# Patient Record
Sex: MALE | Race: WHITE | NOT HISPANIC OR LATINO | Employment: OTHER
[De-identification: names, ages, dates, MRNs, and addresses within clinical notes are randomized per-mention and may not be internally consistent; named-entity substitution may affect disease eponyms.]

---

## 2020-09-06 ENCOUNTER — HOSPITAL (OUTPATIENT)
Dept: OTHER | Age: 85
End: 2020-09-06
Attending: SURGERY

## 2020-09-06 PROCEDURE — 99284 EMERGENCY DEPT VISIT MOD MDM: CPT | Performed by: STUDENT IN AN ORGANIZED HEALTH CARE EDUCATION/TRAINING PROGRAM

## 2020-09-06 PROCEDURE — 93308 TTE F-UP OR LMTD: CPT | Performed by: SURGERY

## 2020-09-06 PROCEDURE — 99223 1ST HOSP IP/OBS HIGH 75: CPT | Performed by: SURGERY

## 2020-09-06 PROCEDURE — 76705 ECHO EXAM OF ABDOMEN: CPT | Performed by: SURGERY

## 2020-09-06 PROCEDURE — 12002 RPR S/N/AX/GEN/TRNK2.6-7.5CM: CPT | Performed by: SURGERY

## 2020-09-07 LAB
SARS-COV-2 RNA RESP QL NAA+PROBE: NOT DETECTED
SERVICE CMNT-IMP: NORMAL
SPECIMEN SOURCE: NORMAL

## 2020-09-07 PROCEDURE — 99232 SBSQ HOSP IP/OBS MODERATE 35: CPT | Performed by: SURGERY

## 2020-09-08 PROCEDURE — 99232 SBSQ HOSP IP/OBS MODERATE 35: CPT | Performed by: SURGERY

## 2020-09-09 PROCEDURE — 99232 SBSQ HOSP IP/OBS MODERATE 35: CPT | Performed by: SURGERY

## 2020-09-10 PROCEDURE — 99238 HOSP IP/OBS DSCHRG MGMT 30/<: CPT | Performed by: SURGERY

## 2021-12-02 ENCOUNTER — APPOINTMENT (OUTPATIENT)
Dept: GENERAL RADIOLOGY | Age: 86
DRG: 689 | End: 2021-12-02
Attending: STUDENT IN AN ORGANIZED HEALTH CARE EDUCATION/TRAINING PROGRAM

## 2021-12-02 ENCOUNTER — HOSPITAL ENCOUNTER (INPATIENT)
Age: 86
LOS: 4 days | Discharge: NURSING, REHAB OR LONG-TERM CARE | DRG: 689 | End: 2021-12-07
Attending: EMERGENCY MEDICINE | Admitting: INTERNAL MEDICINE

## 2021-12-02 ENCOUNTER — APPOINTMENT (OUTPATIENT)
Dept: CT IMAGING | Age: 86
DRG: 689 | End: 2021-12-02
Attending: STUDENT IN AN ORGANIZED HEALTH CARE EDUCATION/TRAINING PROGRAM

## 2021-12-02 DIAGNOSIS — R41.82 ALTERED MENTAL STATUS, UNSPECIFIED ALTERED MENTAL STATUS TYPE: ICD-10-CM

## 2021-12-02 DIAGNOSIS — N39.0 URINARY TRACT INFECTION WITHOUT HEMATURIA, SITE UNSPECIFIED: ICD-10-CM

## 2021-12-02 DIAGNOSIS — Z00.00 WELLNESS EXAMINATION: Primary | ICD-10-CM

## 2021-12-02 LAB
ALBUMIN SERPL-MCNC: 2.8 G/DL (ref 3.6–5.1)
ALBUMIN/GLOB SERPL: 0.7 {RATIO} (ref 1–2.4)
ALP SERPL-CCNC: 51 UNITS/L (ref 45–117)
ALT SERPL-CCNC: 37 UNITS/L
ANION GAP SERPL CALC-SCNC: 13 MMOL/L (ref 10–20)
APPEARANCE UR: ABNORMAL
APPEARANCE UR: ABNORMAL
AST SERPL-CCNC: 100 UNITS/L
BACTERIA #/AREA URNS HPF: ABNORMAL /HPF
BASE EXCESS / DEFICIT, VENOUS - RESPIRATORY: 0 MMOL/L (ref -2–2)
BASOPHILS # BLD: 0 K/MCL (ref 0–0.3)
BASOPHILS NFR BLD: 0 %
BDY SITE: ABNORMAL
BILIRUB SERPL-MCNC: 1.3 MG/DL (ref 0.2–1)
BILIRUB UR QL STRIP: NEGATIVE
BILIRUB UR QL STRIP: NEGATIVE
BODY TEMPERATURE: 37 DEGREES
BUN SERPL-MCNC: 22 MG/DL (ref 6–20)
BUN/CREAT SERPL: 18 (ref 7–25)
CA-I BLD-SCNC: 1.22 MMOL/L (ref 1.15–1.29)
CALCIUM SERPL-MCNC: 9 MG/DL (ref 8.4–10.2)
CHLORIDE BLD-SCNC: 101 MMOL/L (ref 98–107)
CHLORIDE SERPL-SCNC: 102 MMOL/L (ref 98–107)
CO2 SERPL-SCNC: 25 MMOL/L (ref 21–32)
COHGB MFR BLDV: 0.9 % (ref 1.5–15)
COLOR UR: YELLOW
COLOR UR: YELLOW
CREAT SERPL-MCNC: 1.2 MG/DL (ref 0.67–1.17)
DEPRECATED RDW RBC: 45 FL (ref 39–50)
EOSINOPHIL # BLD: 0.3 K/MCL (ref 0–0.5)
EOSINOPHIL NFR BLD: 3 %
ERYTHROCYTE [DISTWIDTH] IN BLOOD: 13.2 % (ref 11–15)
FASTING DURATION TIME PATIENT: ABNORMAL H
FIO2 ON VENT: 21 %
GFR SERPLBLD BASED ON 1.73 SQ M-ARVRAT: 54 ML/MIN
GLOBULIN SER-MCNC: 4 G/DL (ref 2–4)
GLUCOSE BLD-MCNC: 99 MG/DL (ref 65–99)
GLUCOSE SERPL-MCNC: 98 MG/DL (ref 70–99)
GLUCOSE UR STRIP-MCNC: NEGATIVE MG/DL
GLUCOSE UR STRIP-MCNC: NEGATIVE MG/DL
HCO3 BLDV-SCNC: 26.9 MMOL/L (ref 22–28)
HCT VFR BLD CALC: 38.9 % (ref 39–51)
HGB BLD-MCNC: 13.2 G/DL (ref 13–17)
HGB BLD-MCNC: 15.6 G/DL (ref 13–17)
HGB UR QL STRIP: ABNORMAL
HGB UR QL STRIP: ABNORMAL
HYALINE CASTS #/AREA URNS LPF: ABNORMAL /LPF
IMM GRANULOCYTES # BLD AUTO: 0.1 K/MCL (ref 0–0.2)
IMM GRANULOCYTES # BLD: 1 %
KETONES UR STRIP-MCNC: NEGATIVE MG/DL
KETONES UR STRIP-MCNC: NEGATIVE MG/DL
LACTATE BLDV-SCNC: 1.3 MMOL/L (ref 0–2)
LACTATE BLDV-SCNC: 2.3 MMOL/L
LEUKOCYTE ESTERASE UR QL STRIP: ABNORMAL
LEUKOCYTE ESTERASE UR QL STRIP: ABNORMAL
LYMPHOCYTES # BLD: 1.6 K/MCL (ref 1–4)
LYMPHOCYTES NFR BLD: 16 %
MCH RBC QN AUTO: 31.9 PG (ref 26–34)
MCHC RBC AUTO-ENTMCNC: 33.9 G/DL (ref 32–36.5)
MCV RBC AUTO: 94 FL (ref 78–100)
METHGB MFR BLDMV: 0.4 %
MONOCYTES # BLD: 1.2 K/MCL (ref 0.3–0.9)
MONOCYTES NFR BLD: 12 %
MUCOUS THREADS URNS QL MICRO: PRESENT
NEUTROPHILS # BLD: 6.8 K/MCL (ref 1.8–7.7)
NEUTROPHILS NFR BLD: 68 %
NITRITE UR QL STRIP: NEGATIVE
NITRITE UR QL STRIP: NEGATIVE
NRBC BLD MANUAL-RTO: 0 /100 WBC
OXYHGB MFR BLDV: 27.2 % (ref 60–80)
PCO2 BLDV: 51 MM HG (ref 41–54)
PH BLDV: 7.33 UNITS (ref 7.35–7.45)
PH UR STRIP: 6 UNITS (ref 5–7)
PH UR STRIP: 6 [PH] (ref 5–7)
PLATELET # BLD AUTO: 274 K/MCL (ref 140–450)
PO2 BLDV: 20 MM HG (ref 35–42)
POTASSIUM BLD-SCNC: 3.6 MMOL/L (ref 3.4–5.1)
POTASSIUM SERPL-SCNC: 4.1 MMOL/L (ref 3.4–5.1)
PROT SERPL-MCNC: 6.8 G/DL (ref 6.4–8.2)
PROT UR STRIP-MCNC: NEGATIVE MG/DL
PROT UR STRIP-MCNC: NEGATIVE MG/DL
RBC # BLD: 4.14 MIL/MCL (ref 4.5–5.9)
RBC #/AREA URNS HPF: ABNORMAL /HPF
SAO2 DF BLDV: 28 % (ref 60–80)
SARS-COV-2 RNA RESP QL NAA+PROBE: NOT DETECTED
SERVICE CMNT-IMP: NORMAL
SERVICE CMNT-IMP: NORMAL
SODIUM BLD-SCNC: 132 MMOL/L (ref 135–145)
SODIUM SERPL-SCNC: 136 MMOL/L (ref 135–145)
SP GR UR STRIP: 1.01 (ref 1–1.03)
SP GR UR STRIP: 1.02 (ref 1–1.03)
SQUAMOUS #/AREA URNS HPF: ABNORMAL /HPF
TROPONIN I SERPL DL<=0.01 NG/ML-MCNC: 20 NG/L
UROBILINOGEN UR STRIP-MCNC: 0.2 MG/DL
UROBILINOGEN UR STRIP-MCNC: 0.2 MG/DL
WBC # BLD: 10 K/MCL (ref 4.2–11)
WBC #/AREA URNS HPF: >100 /HPF

## 2021-12-02 PROCEDURE — 87088 URINE BACTERIA CULTURE: CPT | Performed by: STUDENT IN AN ORGANIZED HEALTH CARE EDUCATION/TRAINING PROGRAM

## 2021-12-02 PROCEDURE — G1004 CDSM NDSC: HCPCS

## 2021-12-02 PROCEDURE — 84484 ASSAY OF TROPONIN QUANT: CPT | Performed by: EMERGENCY MEDICINE

## 2021-12-02 PROCEDURE — 10004281 HB COUNTER-STAFF TIME PER 15 MIN

## 2021-12-02 PROCEDURE — 81003 URINALYSIS AUTO W/O SCOPE: CPT

## 2021-12-02 PROCEDURE — 87635 SARS-COV-2 COVID-19 AMP PRB: CPT | Performed by: EMERGENCY MEDICINE

## 2021-12-02 PROCEDURE — 82375 ASSAY CARBOXYHB QUANT: CPT

## 2021-12-02 PROCEDURE — 82746 ASSAY OF FOLIC ACID SERUM: CPT | Performed by: SPECIALIST/TECHNOLOGIST, OTHER

## 2021-12-02 PROCEDURE — 82550 ASSAY OF CK (CPK): CPT | Performed by: SPECIALIST/TECHNOLOGIST, OTHER

## 2021-12-02 PROCEDURE — 83605 ASSAY OF LACTIC ACID: CPT | Performed by: EMERGENCY MEDICINE

## 2021-12-02 PROCEDURE — C9803 HOPD COVID-19 SPEC COLLECT: HCPCS

## 2021-12-02 PROCEDURE — 85018 HEMOGLOBIN: CPT

## 2021-12-02 PROCEDURE — 82435 ASSAY OF BLOOD CHLORIDE: CPT

## 2021-12-02 PROCEDURE — 80053 COMPREHEN METABOLIC PANEL: CPT | Performed by: EMERGENCY MEDICINE

## 2021-12-02 PROCEDURE — 82330 ASSAY OF CALCIUM: CPT

## 2021-12-02 PROCEDURE — 99284 EMERGENCY DEPT VISIT MOD MDM: CPT | Performed by: EMERGENCY MEDICINE

## 2021-12-02 PROCEDURE — 71045 X-RAY EXAM CHEST 1 VIEW: CPT

## 2021-12-02 PROCEDURE — 93010 ELECTROCARDIOGRAM REPORT: CPT | Performed by: INTERNAL MEDICINE

## 2021-12-02 PROCEDURE — G0378 HOSPITAL OBSERVATION PER HR: HCPCS

## 2021-12-02 PROCEDURE — 70450 CT HEAD/BRAIN W/O DYE: CPT

## 2021-12-02 PROCEDURE — 83605 ASSAY OF LACTIC ACID: CPT

## 2021-12-02 PROCEDURE — 82330 ASSAY OF CALCIUM: CPT | Performed by: EMERGENCY MEDICINE

## 2021-12-02 PROCEDURE — 81001 URINALYSIS AUTO W/SCOPE: CPT | Performed by: STUDENT IN AN ORGANIZED HEALTH CARE EDUCATION/TRAINING PROGRAM

## 2021-12-02 PROCEDURE — 84295 ASSAY OF SERUM SODIUM: CPT

## 2021-12-02 PROCEDURE — 86592 SYPHILIS TEST NON-TREP QUAL: CPT | Performed by: SPECIALIST/TECHNOLOGIST, OTHER

## 2021-12-02 PROCEDURE — 99285 EMERGENCY DEPT VISIT HI MDM: CPT

## 2021-12-02 PROCEDURE — 84132 ASSAY OF SERUM POTASSIUM: CPT

## 2021-12-02 PROCEDURE — 93005 ELECTROCARDIOGRAM TRACING: CPT | Performed by: EMERGENCY MEDICINE

## 2021-12-02 PROCEDURE — 96374 THER/PROPH/DIAG INJ IV PUSH: CPT

## 2021-12-02 PROCEDURE — 85025 COMPLETE CBC W/AUTO DIFF WBC: CPT | Performed by: EMERGENCY MEDICINE

## 2021-12-02 PROCEDURE — 36415 COLL VENOUS BLD VENIPUNCTURE: CPT

## 2021-12-02 PROCEDURE — 10002807 HB RX 258: Performed by: STUDENT IN AN ORGANIZED HEALTH CARE EDUCATION/TRAINING PROGRAM

## 2021-12-02 PROCEDURE — 82947 ASSAY GLUCOSE BLOOD QUANT: CPT

## 2021-12-02 PROCEDURE — 10002800 HB RX 250 W HCPCS: Performed by: EMERGENCY MEDICINE

## 2021-12-02 PROCEDURE — 84443 ASSAY THYROID STIM HORMONE: CPT | Performed by: SPECIALIST/TECHNOLOGIST, OTHER

## 2021-12-02 RX ORDER — ACETAMINOPHEN 325 MG/1
650 TABLET ORAL EVERY 4 HOURS PRN
Status: DISCONTINUED | OUTPATIENT
Start: 2021-12-02 | End: 2021-12-04

## 2021-12-02 RX ORDER — QUETIAPINE FUMARATE 25 MG/1
25 TABLET, FILM COATED ORAL 2 TIMES DAILY
Status: ON HOLD | COMMUNITY
Start: 2021-10-29 | End: 2021-12-06 | Stop reason: HOSPADM

## 2021-12-02 RX ORDER — DONEPEZIL HYDROCHLORIDE 5 MG/1
TABLET, FILM COATED ORAL
COMMUNITY
Start: 2021-10-18

## 2021-12-02 RX ORDER — ENOXAPARIN SODIUM 100 MG/ML
40 INJECTION SUBCUTANEOUS DAILY
Status: DISCONTINUED | OUTPATIENT
Start: 2021-12-03 | End: 2021-12-07 | Stop reason: HOSPADM

## 2021-12-02 RX ORDER — ONDANSETRON 2 MG/ML
4 INJECTION INTRAMUSCULAR; INTRAVENOUS 4 TIMES DAILY PRN
Status: DISCONTINUED | OUTPATIENT
Start: 2021-12-02 | End: 2021-12-07 | Stop reason: HOSPADM

## 2021-12-02 RX ORDER — DOCUSATE SODIUM 100 MG/1
100 CAPSULE, LIQUID FILLED ORAL DAILY PRN
Status: DISCONTINUED | OUTPATIENT
Start: 2021-12-02 | End: 2021-12-07 | Stop reason: HOSPADM

## 2021-12-02 RX ORDER — ATORVASTATIN CALCIUM 10 MG/1
10 TABLET, FILM COATED ORAL NIGHTLY
Status: DISCONTINUED | OUTPATIENT
Start: 2021-12-03 | End: 2021-12-07 | Stop reason: HOSPADM

## 2021-12-02 RX ORDER — METOPROLOL SUCCINATE 25 MG/1
12.5 TABLET, EXTENDED RELEASE ORAL DAILY
Status: ON HOLD | COMMUNITY
Start: 2021-10-18 | End: 2021-12-06 | Stop reason: HOSPADM

## 2021-12-02 RX ORDER — DONEPEZIL HYDROCHLORIDE 5 MG/1
5 TABLET, FILM COATED ORAL NIGHTLY
Status: DISCONTINUED | OUTPATIENT
Start: 2021-12-03 | End: 2021-12-07 | Stop reason: HOSPADM

## 2021-12-02 RX ORDER — ASPIRIN 81 MG/1
81 TABLET, CHEWABLE ORAL DAILY
Status: DISCONTINUED | OUTPATIENT
Start: 2021-12-02 | End: 2021-12-07 | Stop reason: HOSPADM

## 2021-12-02 RX ORDER — 0.9 % SODIUM CHLORIDE 0.9 %
2 VIAL (ML) INJECTION EVERY 12 HOURS SCHEDULED
Status: DISCONTINUED | OUTPATIENT
Start: 2021-12-03 | End: 2021-12-07 | Stop reason: HOSPADM

## 2021-12-02 RX ORDER — ATORVASTATIN CALCIUM 10 MG/1
TABLET, FILM COATED ORAL
COMMUNITY
Start: 2021-11-29

## 2021-12-02 RX ORDER — METOPROLOL SUCCINATE 25 MG/1
25 TABLET, EXTENDED RELEASE ORAL DAILY
Status: DISCONTINUED | OUTPATIENT
Start: 2021-12-03 | End: 2021-12-04

## 2021-12-02 RX ORDER — HYDRALAZINE HYDROCHLORIDE 20 MG/ML
10 INJECTION INTRAMUSCULAR; INTRAVENOUS EVERY 4 HOURS PRN
Status: DISCONTINUED | OUTPATIENT
Start: 2021-12-02 | End: 2021-12-07 | Stop reason: HOSPADM

## 2021-12-02 RX ORDER — TAMSULOSIN HYDROCHLORIDE 0.4 MG/1
0.4 CAPSULE ORAL
Status: DISCONTINUED | OUTPATIENT
Start: 2021-12-03 | End: 2021-12-07 | Stop reason: HOSPADM

## 2021-12-02 RX ORDER — ASPIRIN 81 MG/1
81 TABLET, CHEWABLE ORAL DAILY
COMMUNITY
Start: 2021-12-20

## 2021-12-02 RX ORDER — CEFAZOLIN SODIUM/WATER 2 G/20 ML
2000 SYRINGE (ML) INTRAVENOUS ONCE
Status: COMPLETED | OUTPATIENT
Start: 2021-12-02 | End: 2021-12-02

## 2021-12-02 RX ORDER — TAMSULOSIN HYDROCHLORIDE 0.4 MG/1
CAPSULE ORAL
COMMUNITY
Start: 2021-10-29

## 2021-12-02 RX ADMIN — SODIUM CHLORIDE 500 ML: 9 INJECTION, SOLUTION INTRAVENOUS at 23:02

## 2021-12-02 RX ADMIN — Medication 2000 MG: at 23:01

## 2021-12-02 ASSESSMENT — PAIN SCALES - GENERAL: PAINLEVEL_OUTOF10: 2

## 2021-12-03 LAB
ALBUMIN SERPL-MCNC: 2.4 G/DL (ref 3.6–5.1)
ALBUMIN/GLOB SERPL: 0.8 {RATIO} (ref 1–2.4)
ALP SERPL-CCNC: 42 UNITS/L (ref 45–117)
ALT SERPL-CCNC: 35 UNITS/L
ANION GAP SERPL CALC-SCNC: 16 MMOL/L (ref 10–20)
AST SERPL-CCNC: 100 UNITS/L
BASOPHILS # BLD: 0.1 K/MCL (ref 0–0.3)
BASOPHILS NFR BLD: 1 %
BILIRUB SERPL-MCNC: 0.9 MG/DL (ref 0.2–1)
BUN SERPL-MCNC: 23 MG/DL (ref 6–20)
BUN/CREAT SERPL: 20 (ref 7–25)
CA-I ADJ PH7.4 SERPL-SCNC: 1.21 MMOL/L (ref 1.15–1.29)
CA-I SERPL ISE-SCNC: 1.26 MMOL/L (ref 1.15–1.29)
CALCIUM SERPL-MCNC: 8.5 MG/DL (ref 8.4–10.2)
CHLORIDE SERPL-SCNC: 106 MMOL/L (ref 98–107)
CK SERPL-CCNC: 1462 UNITS/L (ref 39–308)
CO2 SERPL-SCNC: 22 MMOL/L (ref 21–32)
CREAT SERPL-MCNC: 1.16 MG/DL (ref 0.67–1.17)
DEPRECATED RDW RBC: 45.3 FL (ref 39–50)
EOSINOPHIL # BLD: 0.3 K/MCL (ref 0–0.5)
EOSINOPHIL NFR BLD: 4 %
ERYTHROCYTE [DISTWIDTH] IN BLOOD: 13.2 % (ref 11–15)
FASTING DURATION TIME PATIENT: ABNORMAL H
FOLATE SERPL-MCNC: >24 NG/ML
GFR SERPLBLD BASED ON 1.73 SQ M-ARVRAT: 56 ML/MIN
GLOBULIN SER-MCNC: 3 G/DL (ref 2–4)
GLUCOSE BLDC GLUCOMTR-MCNC: 75 MG/DL (ref 70–99)
GLUCOSE BLDC GLUCOMTR-MCNC: 75 MG/DL (ref 70–99)
GLUCOSE BLDC GLUCOMTR-MCNC: 88 MG/DL (ref 70–99)
GLUCOSE BLDC GLUCOMTR-MCNC: 88 MG/DL (ref 70–99)
GLUCOSE SERPL-MCNC: 89 MG/DL (ref 70–99)
HCT VFR BLD CALC: 33.4 % (ref 39–51)
HGB BLD-MCNC: 11.2 G/DL (ref 13–17)
IMM GRANULOCYTES # BLD AUTO: 0.1 K/MCL (ref 0–0.2)
IMM GRANULOCYTES # BLD: 1 %
LYMPHOCYTES # BLD: 1.4 K/MCL (ref 1–4)
LYMPHOCYTES NFR BLD: 16 %
MCH RBC QN AUTO: 31.5 PG (ref 26–34)
MCHC RBC AUTO-ENTMCNC: 33.5 G/DL (ref 32–36.5)
MCV RBC AUTO: 94.1 FL (ref 78–100)
MONOCYTES # BLD: 1.2 K/MCL (ref 0.3–0.9)
MONOCYTES NFR BLD: 13 %
NEUTROPHILS # BLD: 6 K/MCL (ref 1.8–7.7)
NEUTROPHILS NFR BLD: 65 %
NRBC BLD MANUAL-RTO: 0 /100 WBC
PLATELET # BLD AUTO: 238 K/MCL (ref 140–450)
POTASSIUM SERPL-SCNC: 4.3 MMOL/L (ref 3.4–5.1)
PROT SERPL-MCNC: 5.4 G/DL (ref 6.4–8.2)
RBC # BLD: 3.55 MIL/MCL (ref 4.5–5.9)
RPR SER QL: NONREACTIVE
SODIUM SERPL-SCNC: 140 MMOL/L (ref 135–145)
TSH SERPL-ACNC: 1.29 MCUNITS/ML (ref 0.35–5)
VIT B12 SERPL-MCNC: 1719 PG/ML (ref 211–911)
WBC # BLD: 9 K/MCL (ref 4.2–11)

## 2021-12-03 PROCEDURE — 99222 1ST HOSP IP/OBS MODERATE 55: CPT | Performed by: PSYCHIATRY & NEUROLOGY

## 2021-12-03 PROCEDURE — 96372 THER/PROPH/DIAG INJ SC/IM: CPT

## 2021-12-03 PROCEDURE — 10002800 HB RX 250 W HCPCS: Performed by: SPECIALIST/TECHNOLOGIST, OTHER

## 2021-12-03 PROCEDURE — 97166 OT EVAL MOD COMPLEX 45 MIN: CPT

## 2021-12-03 PROCEDURE — 85025 COMPLETE CBC W/AUTO DIFF WBC: CPT | Performed by: SPECIALIST/TECHNOLOGIST, OTHER

## 2021-12-03 PROCEDURE — 10002803 HB RX 637: Performed by: SPECIALIST/TECHNOLOGIST, OTHER

## 2021-12-03 PROCEDURE — 96375 TX/PRO/DX INJ NEW DRUG ADDON: CPT

## 2021-12-03 PROCEDURE — 10004651 HB RX, NO CHARGE ITEM: Performed by: SPECIALIST/TECHNOLOGIST, OTHER

## 2021-12-03 PROCEDURE — 80053 COMPREHEN METABOLIC PANEL: CPT | Performed by: SPECIALIST/TECHNOLOGIST, OTHER

## 2021-12-03 PROCEDURE — G0378 HOSPITAL OBSERVATION PER HR: HCPCS

## 2021-12-03 PROCEDURE — 36415 COLL VENOUS BLD VENIPUNCTURE: CPT | Performed by: SPECIALIST/TECHNOLOGIST, OTHER

## 2021-12-03 PROCEDURE — 97162 PT EVAL MOD COMPLEX 30 MIN: CPT

## 2021-12-03 PROCEDURE — 10000002 HB ROOM CHARGE MED SURG

## 2021-12-03 PROCEDURE — 82962 GLUCOSE BLOOD TEST: CPT

## 2021-12-03 RX ORDER — DOCUSATE SODIUM 100 MG/1
100 CAPSULE, LIQUID FILLED ORAL 2 TIMES DAILY
Status: ON HOLD | COMMUNITY
End: 2021-12-06 | Stop reason: HOSPADM

## 2021-12-03 RX ORDER — CEFAZOLIN SODIUM/WATER 1 G/10 ML
1000 SYRINGE (ML) INTRAVENOUS DAILY
Status: DISCONTINUED | OUTPATIENT
Start: 2021-12-03 | End: 2021-12-04

## 2021-12-03 RX ORDER — SENNA AND DOCUSATE SODIUM 50; 8.6 MG/1; MG/1
1 TABLET, FILM COATED ORAL PRN
COMMUNITY

## 2021-12-03 RX ORDER — ASPIRIN 325 MG
325 TABLET, DELAYED RELEASE (ENTERIC COATED) ORAL 2 TIMES DAILY
COMMUNITY
Start: 2021-11-30 | End: 2021-12-19

## 2021-12-03 RX ORDER — PANTOPRAZOLE SODIUM 40 MG/1
40 TABLET, DELAYED RELEASE ORAL DAILY
COMMUNITY

## 2021-12-03 RX ORDER — DEXTROSE AND SODIUM CHLORIDE 5; .45 G/100ML; G/100ML
INJECTION, SOLUTION INTRAVENOUS CONTINUOUS
Status: DISCONTINUED | OUTPATIENT
Start: 2021-12-03 | End: 2021-12-03

## 2021-12-03 RX ORDER — ACETAMINOPHEN 325 MG/1
650 TABLET ORAL EVERY 4 HOURS PRN
COMMUNITY

## 2021-12-03 RX ADMIN — ENOXAPARIN SODIUM 40 MG: 40 INJECTION SUBCUTANEOUS at 10:37

## 2021-12-03 RX ADMIN — ATORVASTATIN CALCIUM 10 MG: 10 TABLET, FILM COATED ORAL at 20:42

## 2021-12-03 RX ADMIN — TAMSULOSIN HYDROCHLORIDE 0.4 MG: 0.4 CAPSULE ORAL at 12:03

## 2021-12-03 RX ADMIN — SODIUM CHLORIDE, PRESERVATIVE FREE 2 ML: 5 INJECTION INTRAVENOUS at 20:42

## 2021-12-03 RX ADMIN — METOPROLOL SUCCINATE 25 MG: 25 TABLET, EXTENDED RELEASE ORAL at 12:03

## 2021-12-03 RX ADMIN — KETOROLAC TROMETHAMINE 15 MG: 30 INJECTION, SOLUTION INTRAMUSCULAR; INTRAVENOUS at 09:53

## 2021-12-03 RX ADMIN — Medication 1000 MG: at 22:06

## 2021-12-03 RX ADMIN — DONEPEZIL HYDROCHLORIDE 5 MG: 5 TABLET, FILM COATED ORAL at 20:42

## 2021-12-03 RX ADMIN — SODIUM CHLORIDE, PRESERVATIVE FREE 2 ML: 5 INJECTION INTRAVENOUS at 09:53

## 2021-12-03 ASSESSMENT — ACTIVITIES OF DAILY LIVING (ADL)
CHRONIC_PAIN_PRESENT: UNABLE TO ASSESS
ADL_SCORE: 0
TRANSFERRING: DEPENDENT
FEEDING YOURSELF: DEPENDENT
DRESSING YOURSELF: DEPENDENT
CONTINENCE: DEPENDENT
MOBILITY_ASSIST_DEVICES: OTHER (COMMENT)
TOILETING: DEPENDENT
ADL_BEFORE_ADMISSION: NEEDS/REQUIRES ASSISTANCE
BATHING: DEPENDENT

## 2021-12-03 ASSESSMENT — COGNITIVE AND FUNCTIONAL STATUS - GENERAL
HELP NEEDED FOR PERSONAL GROOMING: A LITTLE
DAILY_ACTIVITY_RAW_SCORE: 13
HELP NEEDED DRESSING REGULAR UPPER BODY CLOTHING: A LITTLE
BASIC_MOBILITY_RAW_SCORE: 7
BASIC_MOBILITY_CONVERTED_SCORE: 19.39
HELP NEEDED FOR BATHING: A LOT
DAILY_ACTIVITY_CONVERTED_SCORE: 32.03
HELP NEEDED FOR TOILETING: TOTAL
ARE YOU DEAF OR DO YOU HAVE SERIOUS DIFFICULTY  HEARING: YES
HELP NEEDED DRESSING REGULAR LOWER BODY CLOTHING: TOTAL

## 2021-12-03 ASSESSMENT — PAIN SCALES - PAIN ASSESSMENT IN ADVANCED DEMENTIA (PAINAD)
BODYLANGUAGE: RELAXED
CONSOLABILITY: NO NEED TO CONSOLE
BREATHING: NORMAL
FACIALEXPRESSION: SMILING OR INEXPRESSIVE
TOTALSCORE: 0

## 2021-12-03 ASSESSMENT — COLUMBIA-SUICIDE SEVERITY RATING SCALE - C-SSRS: IS THE PATIENT ABLE TO COMPLETE C-SSRS: NO, DEFER TO LATER TIME

## 2021-12-03 ASSESSMENT — PATIENT HEALTH QUESTIONNAIRE - PHQ9: IS PATIENT ABLE TO COMPLETE PHQ2 OR PHQ9: NO, DEFER TO LATER TIME

## 2021-12-03 ASSESSMENT — PAIN SCALES - GENERAL
PAINLEVEL_OUTOF10: 8
PAINLEVEL_OUTOF10: 0

## 2021-12-03 ASSESSMENT — PAIN SCALES - WONG BAKER: WONGBAKER_NUMERICALRESPONSE: 8

## 2021-12-03 ASSESSMENT — LIFESTYLE VARIABLES: ALCOHOL_USE_STATUS: UNABLE TO SCREEN COGNITIVE IMPAIRMENT

## 2021-12-04 LAB
ALBUMIN SERPL-MCNC: 2.2 G/DL (ref 3.6–5.1)
ALBUMIN/GLOB SERPL: 0.7 {RATIO} (ref 1–2.4)
ALP SERPL-CCNC: 40 UNITS/L (ref 45–117)
ALT SERPL-CCNC: 36 UNITS/L
ANION GAP SERPL CALC-SCNC: 14 MMOL/L (ref 10–20)
AST SERPL-CCNC: 78 UNITS/L
BACTERIA UR CULT: ABNORMAL
BASOPHILS # BLD: 0 K/MCL (ref 0–0.3)
BASOPHILS NFR BLD: 0 %
BILIRUB SERPL-MCNC: 0.7 MG/DL (ref 0.2–1)
BUN SERPL-MCNC: 36 MG/DL (ref 6–20)
BUN/CREAT SERPL: 27 (ref 7–25)
CALCIUM SERPL-MCNC: 8.3 MG/DL (ref 8.4–10.2)
CHLORIDE SERPL-SCNC: 105 MMOL/L (ref 98–107)
CO2 SERPL-SCNC: 23 MMOL/L (ref 21–32)
CREAT SERPL-MCNC: 1.34 MG/DL (ref 0.67–1.17)
DEPRECATED RDW RBC: 44.9 FL (ref 39–50)
EOSINOPHIL # BLD: 0.4 K/MCL (ref 0–0.5)
EOSINOPHIL NFR BLD: 5 %
ERYTHROCYTE [DISTWIDTH] IN BLOOD: 13 % (ref 11–15)
FASTING DURATION TIME PATIENT: ABNORMAL H
GFR SERPLBLD BASED ON 1.73 SQ M-ARVRAT: 47 ML/MIN
GLOBULIN SER-MCNC: 3 G/DL (ref 2–4)
GLUCOSE SERPL-MCNC: 93 MG/DL (ref 70–99)
HCT VFR BLD CALC: 32 % (ref 39–51)
HGB BLD-MCNC: 10.6 G/DL (ref 13–17)
IMM GRANULOCYTES # BLD AUTO: 0.1 K/MCL (ref 0–0.2)
IMM GRANULOCYTES # BLD: 1 %
LYMPHOCYTES # BLD: 1.5 K/MCL (ref 1–4)
LYMPHOCYTES NFR BLD: 16 %
MCH RBC QN AUTO: 31.4 PG (ref 26–34)
MCHC RBC AUTO-ENTMCNC: 33.1 G/DL (ref 32–36.5)
MCV RBC AUTO: 94.7 FL (ref 78–100)
MONOCYTES # BLD: 1 K/MCL (ref 0.3–0.9)
MONOCYTES NFR BLD: 11 %
NEUTROPHILS # BLD: 6.1 K/MCL (ref 1.8–7.7)
NEUTROPHILS NFR BLD: 67 %
NRBC BLD MANUAL-RTO: 0 /100 WBC
PLATELET # BLD AUTO: 235 K/MCL (ref 140–450)
POTASSIUM SERPL-SCNC: 4.4 MMOL/L (ref 3.4–5.1)
PROT SERPL-MCNC: 5.2 G/DL (ref 6.4–8.2)
RBC # BLD: 3.38 MIL/MCL (ref 4.5–5.9)
SODIUM SERPL-SCNC: 138 MMOL/L (ref 135–145)
WBC # BLD: 9 K/MCL (ref 4.2–11)

## 2021-12-04 PROCEDURE — 10004651 HB RX, NO CHARGE ITEM: Performed by: SPECIALIST/TECHNOLOGIST, OTHER

## 2021-12-04 PROCEDURE — 10004651 HB RX, NO CHARGE ITEM

## 2021-12-04 PROCEDURE — 85025 COMPLETE CBC W/AUTO DIFF WBC: CPT | Performed by: SPECIALIST/TECHNOLOGIST, OTHER

## 2021-12-04 PROCEDURE — 36415 COLL VENOUS BLD VENIPUNCTURE: CPT | Performed by: SPECIALIST/TECHNOLOGIST, OTHER

## 2021-12-04 PROCEDURE — 10002800 HB RX 250 W HCPCS: Performed by: SPECIALIST/TECHNOLOGIST, OTHER

## 2021-12-04 PROCEDURE — 80053 COMPREHEN METABOLIC PANEL: CPT | Performed by: SPECIALIST/TECHNOLOGIST, OTHER

## 2021-12-04 PROCEDURE — 10002803 HB RX 637: Performed by: SPECIALIST/TECHNOLOGIST, OTHER

## 2021-12-04 PROCEDURE — 10000002 HB ROOM CHARGE MED SURG

## 2021-12-04 PROCEDURE — 10002800 HB RX 250 W HCPCS: Performed by: STUDENT IN AN ORGANIZED HEALTH CARE EDUCATION/TRAINING PROGRAM

## 2021-12-04 PROCEDURE — 10002807 HB RX 258: Performed by: STUDENT IN AN ORGANIZED HEALTH CARE EDUCATION/TRAINING PROGRAM

## 2021-12-04 RX ORDER — ACETAMINOPHEN 325 MG/1
650 TABLET ORAL EVERY 6 HOURS SCHEDULED
Status: DISCONTINUED | OUTPATIENT
Start: 2021-12-04 | End: 2021-12-07 | Stop reason: HOSPADM

## 2021-12-04 RX ORDER — METOPROLOL SUCCINATE 25 MG/1
12.5 TABLET, EXTENDED RELEASE ORAL DAILY
Status: DISCONTINUED | OUTPATIENT
Start: 2021-12-05 | End: 2021-12-07 | Stop reason: HOSPADM

## 2021-12-04 RX ADMIN — CEFEPIME HYDROCHLORIDE 1000 MG: 1 INJECTION, POWDER, FOR SOLUTION INTRAMUSCULAR; INTRAVENOUS at 18:02

## 2021-12-04 RX ADMIN — ENOXAPARIN SODIUM 40 MG: 40 INJECTION SUBCUTANEOUS at 08:09

## 2021-12-04 RX ADMIN — TAMSULOSIN HYDROCHLORIDE 0.4 MG: 0.4 CAPSULE ORAL at 08:09

## 2021-12-04 RX ADMIN — METOPROLOL SUCCINATE 25 MG: 25 TABLET, EXTENDED RELEASE ORAL at 08:09

## 2021-12-04 RX ADMIN — ASPIRIN 81 MG CHEWABLE TABLET 81 MG: 81 TABLET CHEWABLE at 08:09

## 2021-12-04 RX ADMIN — ACETAMINOPHEN 650 MG: 325 TABLET ORAL at 17:55

## 2021-12-04 RX ADMIN — ATORVASTATIN CALCIUM 10 MG: 10 TABLET, FILM COATED ORAL at 20:20

## 2021-12-04 RX ADMIN — DONEPEZIL HYDROCHLORIDE 5 MG: 5 TABLET, FILM COATED ORAL at 20:20

## 2021-12-04 ASSESSMENT — PAIN SCALES - GENERAL
PAINLEVEL_OUTOF10: 0
PAINLEVEL_OUTOF10: 10

## 2021-12-04 ASSESSMENT — PAIN SCALES - PAIN ASSESSMENT IN ADVANCED DEMENTIA (PAINAD)
CONSOLABILITY: NO NEED TO CONSOLE
FACIALEXPRESSION: SMILING OR INEXPRESSIVE
BREATHING: NORMAL
NEGVOCALIZATION: OCCASIONAL MOAN OR GROAN, LOW LEVELS OF SPEECH WITH A NEGATIVE OR DISAPPROVING QUALITY
TOTALSCORE: 2
BODYLANGUAGE: TENSE, DISTRESSED, FIDGETING

## 2021-12-05 LAB
ALBUMIN SERPL-MCNC: 2.4 G/DL (ref 3.6–5.1)
ALBUMIN/GLOB SERPL: 0.7 {RATIO} (ref 1–2.4)
ALP SERPL-CCNC: 41 UNITS/L (ref 45–117)
ALT SERPL-CCNC: 38 UNITS/L
ANION GAP SERPL CALC-SCNC: 14 MMOL/L (ref 10–20)
AST SERPL-CCNC: 65 UNITS/L
ATRIAL RATE (BPM): 73
BASOPHILS # BLD: 0.1 K/MCL (ref 0–0.3)
BASOPHILS NFR BLD: 1 %
BILIRUB SERPL-MCNC: 0.8 MG/DL (ref 0.2–1)
BUN SERPL-MCNC: 25 MG/DL (ref 6–20)
BUN/CREAT SERPL: 23 (ref 7–25)
CALCIUM SERPL-MCNC: 8.6 MG/DL (ref 8.4–10.2)
CHLORIDE SERPL-SCNC: 104 MMOL/L (ref 98–107)
CO2 SERPL-SCNC: 24 MMOL/L (ref 21–32)
CREAT SERPL-MCNC: 1.11 MG/DL (ref 0.67–1.17)
DEPRECATED RDW RBC: 44.5 FL (ref 39–50)
EOSINOPHIL # BLD: 0.4 K/MCL (ref 0–0.5)
EOSINOPHIL NFR BLD: 5 %
ERYTHROCYTE [DISTWIDTH] IN BLOOD: 12.9 % (ref 11–15)
FASTING DURATION TIME PATIENT: ABNORMAL H
GFR SERPLBLD BASED ON 1.73 SQ M-ARVRAT: 59 ML/MIN
GLOBULIN SER-MCNC: 3.6 G/DL (ref 2–4)
GLUCOSE SERPL-MCNC: 96 MG/DL (ref 70–99)
HCT VFR BLD CALC: 33.9 % (ref 39–51)
HGB BLD-MCNC: 11.4 G/DL (ref 13–17)
IMM GRANULOCYTES # BLD AUTO: 0 K/MCL (ref 0–0.2)
IMM GRANULOCYTES # BLD: 1 %
LYMPHOCYTES # BLD: 1.5 K/MCL (ref 1–4)
LYMPHOCYTES NFR BLD: 19 %
MCH RBC QN AUTO: 31.6 PG (ref 26–34)
MCHC RBC AUTO-ENTMCNC: 33.6 G/DL (ref 32–36.5)
MCV RBC AUTO: 93.9 FL (ref 78–100)
MONOCYTES # BLD: 0.8 K/MCL (ref 0.3–0.9)
MONOCYTES NFR BLD: 10 %
NEUTROPHILS # BLD: 4.9 K/MCL (ref 1.8–7.7)
NEUTROPHILS NFR BLD: 64 %
NRBC BLD MANUAL-RTO: 0 /100 WBC
P AXIS (DEGREES): -29
PLATELET # BLD AUTO: 250 K/MCL (ref 140–450)
POTASSIUM SERPL-SCNC: 4 MMOL/L (ref 3.4–5.1)
PR-INTERVAL (MSEC): 202
PROT SERPL-MCNC: 6 G/DL (ref 6.4–8.2)
QRS-INTERVAL (MSEC): 126
QT-INTERVAL (MSEC): 428
QTC: 472
R AXIS (DEGREES): -104
RBC # BLD: 3.61 MIL/MCL (ref 4.5–5.9)
REPORT TEXT: NORMAL
SODIUM SERPL-SCNC: 138 MMOL/L (ref 135–145)
VENTRICULAR RATE EKG/MIN (BPM): 73
WBC # BLD: 7.7 K/MCL (ref 4.2–11)

## 2021-12-05 PROCEDURE — 10002803 HB RX 637: Performed by: SPECIALIST/TECHNOLOGIST, OTHER

## 2021-12-05 PROCEDURE — 10004651 HB RX, NO CHARGE ITEM

## 2021-12-05 PROCEDURE — 10002807 HB RX 258: Performed by: STUDENT IN AN ORGANIZED HEALTH CARE EDUCATION/TRAINING PROGRAM

## 2021-12-05 PROCEDURE — 10000002 HB ROOM CHARGE MED SURG

## 2021-12-05 PROCEDURE — 10002800 HB RX 250 W HCPCS: Performed by: SPECIALIST/TECHNOLOGIST, OTHER

## 2021-12-05 PROCEDURE — 10002800 HB RX 250 W HCPCS: Performed by: STUDENT IN AN ORGANIZED HEALTH CARE EDUCATION/TRAINING PROGRAM

## 2021-12-05 PROCEDURE — 10004651 HB RX, NO CHARGE ITEM: Performed by: STUDENT IN AN ORGANIZED HEALTH CARE EDUCATION/TRAINING PROGRAM

## 2021-12-05 PROCEDURE — 85025 COMPLETE CBC W/AUTO DIFF WBC: CPT | Performed by: SPECIALIST/TECHNOLOGIST, OTHER

## 2021-12-05 PROCEDURE — 10002803 HB RX 637: Performed by: STUDENT IN AN ORGANIZED HEALTH CARE EDUCATION/TRAINING PROGRAM

## 2021-12-05 PROCEDURE — 80053 COMPREHEN METABOLIC PANEL: CPT | Performed by: SPECIALIST/TECHNOLOGIST, OTHER

## 2021-12-05 PROCEDURE — 36415 COLL VENOUS BLD VENIPUNCTURE: CPT | Performed by: SPECIALIST/TECHNOLOGIST, OTHER

## 2021-12-05 RX ADMIN — TAMSULOSIN HYDROCHLORIDE 0.4 MG: 0.4 CAPSULE ORAL at 08:20

## 2021-12-05 RX ADMIN — METOPROLOL SUCCINATE 12.5 MG: 25 TABLET, EXTENDED RELEASE ORAL at 08:20

## 2021-12-05 RX ADMIN — CEFEPIME HYDROCHLORIDE 1000 MG: 1 INJECTION, POWDER, FOR SOLUTION INTRAMUSCULAR; INTRAVENOUS at 17:52

## 2021-12-05 RX ADMIN — ENOXAPARIN SODIUM 40 MG: 40 INJECTION SUBCUTANEOUS at 08:20

## 2021-12-05 RX ADMIN — ACETAMINOPHEN 650 MG: 325 TABLET ORAL at 17:54

## 2021-12-05 RX ADMIN — CEFEPIME HYDROCHLORIDE 1000 MG: 1 INJECTION, POWDER, FOR SOLUTION INTRAMUSCULAR; INTRAVENOUS at 05:55

## 2021-12-05 RX ADMIN — ATORVASTATIN CALCIUM 10 MG: 10 TABLET, FILM COATED ORAL at 20:14

## 2021-12-05 RX ADMIN — ASPIRIN 81 MG CHEWABLE TABLET 81 MG: 81 TABLET CHEWABLE at 08:20

## 2021-12-05 RX ADMIN — ACETAMINOPHEN 650 MG: 325 TABLET ORAL at 12:46

## 2021-12-05 RX ADMIN — DONEPEZIL HYDROCHLORIDE 5 MG: 5 TABLET, FILM COATED ORAL at 20:14

## 2021-12-05 ASSESSMENT — PAIN SCALES - GENERAL
PAINLEVEL_OUTOF10: 0
PAINLEVEL_OUTOF10: 2
PAINLEVEL_OUTOF10: 0
PAINLEVEL_OUTOF10: 0

## 2021-12-05 ASSESSMENT — PAIN SCALES - PAIN ASSESSMENT IN ADVANCED DEMENTIA (PAINAD)
BODYLANGUAGE: TENSE, DISTRESSED, FIDGETING
FACIALEXPRESSION: SMILING OR INEXPRESSIVE
TOTALSCORE: 2
BREATHING: NORMAL
CONSOLABILITY: NO NEED TO CONSOLE
NEGVOCALIZATION: OCCASIONAL MOAN OR GROAN, LOW LEVELS OF SPEECH WITH A NEGATIVE OR DISAPPROVING QUALITY

## 2021-12-06 LAB
ALBUMIN SERPL-MCNC: 2.4 G/DL (ref 3.6–5.1)
ALBUMIN/GLOB SERPL: 0.8 {RATIO} (ref 1–2.4)
ALP SERPL-CCNC: 40 UNITS/L (ref 45–117)
ALT SERPL-CCNC: 35 UNITS/L
ANION GAP SERPL CALC-SCNC: 12 MMOL/L (ref 10–20)
AST SERPL-CCNC: 41 UNITS/L
BASOPHILS # BLD: 0.1 K/MCL (ref 0–0.3)
BASOPHILS NFR BLD: 1 %
BILIRUB SERPL-MCNC: 0.9 MG/DL (ref 0.2–1)
BUN SERPL-MCNC: 22 MG/DL (ref 6–20)
BUN/CREAT SERPL: 20 (ref 7–25)
CALCIUM SERPL-MCNC: 8.6 MG/DL (ref 8.4–10.2)
CHLORIDE SERPL-SCNC: 105 MMOL/L (ref 98–107)
CO2 SERPL-SCNC: 25 MMOL/L (ref 21–32)
CREAT SERPL-MCNC: 1.09 MG/DL (ref 0.67–1.17)
DEPRECATED RDW RBC: 44.6 FL (ref 39–50)
EOSINOPHIL # BLD: 0.4 K/MCL (ref 0–0.5)
EOSINOPHIL NFR BLD: 6 %
ERYTHROCYTE [DISTWIDTH] IN BLOOD: 13.1 % (ref 11–15)
FASTING DURATION TIME PATIENT: ABNORMAL H
GFR SERPLBLD BASED ON 1.73 SQ M-ARVRAT: 61 ML/MIN
GLOBULIN SER-MCNC: 3.1 G/DL (ref 2–4)
GLUCOSE SERPL-MCNC: 93 MG/DL (ref 70–99)
HCT VFR BLD CALC: 32.6 % (ref 39–51)
HGB BLD-MCNC: 11 G/DL (ref 13–17)
IMM GRANULOCYTES # BLD AUTO: 0.1 K/MCL (ref 0–0.2)
IMM GRANULOCYTES # BLD: 1 %
LYMPHOCYTES # BLD: 1.4 K/MCL (ref 1–4)
LYMPHOCYTES NFR BLD: 20 %
MCH RBC QN AUTO: 31.4 PG (ref 26–34)
MCHC RBC AUTO-ENTMCNC: 33.7 G/DL (ref 32–36.5)
MCV RBC AUTO: 93.1 FL (ref 78–100)
MONOCYTES # BLD: 0.9 K/MCL (ref 0.3–0.9)
MONOCYTES NFR BLD: 12 %
NEUTROPHILS # BLD: 4.2 K/MCL (ref 1.8–7.7)
NEUTROPHILS NFR BLD: 60 %
NRBC BLD MANUAL-RTO: 0 /100 WBC
PLATELET # BLD AUTO: 247 K/MCL (ref 140–450)
POTASSIUM SERPL-SCNC: 4.3 MMOL/L (ref 3.4–5.1)
PROT SERPL-MCNC: 5.5 G/DL (ref 6.4–8.2)
RBC # BLD: 3.5 MIL/MCL (ref 4.5–5.9)
SODIUM SERPL-SCNC: 138 MMOL/L (ref 135–145)
WBC # BLD: 7.1 K/MCL (ref 4.2–11)

## 2021-12-06 PROCEDURE — 10004651 HB RX, NO CHARGE ITEM

## 2021-12-06 PROCEDURE — 36415 COLL VENOUS BLD VENIPUNCTURE: CPT | Performed by: SPECIALIST/TECHNOLOGIST, OTHER

## 2021-12-06 PROCEDURE — 10002800 HB RX 250 W HCPCS: Performed by: STUDENT IN AN ORGANIZED HEALTH CARE EDUCATION/TRAINING PROGRAM

## 2021-12-06 PROCEDURE — 10002803 HB RX 637: Performed by: SPECIALIST/TECHNOLOGIST, OTHER

## 2021-12-06 PROCEDURE — 10000002 HB ROOM CHARGE MED SURG

## 2021-12-06 PROCEDURE — 10004651 HB RX, NO CHARGE ITEM: Performed by: STUDENT IN AN ORGANIZED HEALTH CARE EDUCATION/TRAINING PROGRAM

## 2021-12-06 PROCEDURE — 10002803 HB RX 637: Performed by: STUDENT IN AN ORGANIZED HEALTH CARE EDUCATION/TRAINING PROGRAM

## 2021-12-06 PROCEDURE — 10004651 HB RX, NO CHARGE ITEM: Performed by: SPECIALIST/TECHNOLOGIST, OTHER

## 2021-12-06 PROCEDURE — 10002800 HB RX 250 W HCPCS: Performed by: SPECIALIST/TECHNOLOGIST, OTHER

## 2021-12-06 PROCEDURE — 10002807 HB RX 258: Performed by: STUDENT IN AN ORGANIZED HEALTH CARE EDUCATION/TRAINING PROGRAM

## 2021-12-06 PROCEDURE — 80053 COMPREHEN METABOLIC PANEL: CPT | Performed by: SPECIALIST/TECHNOLOGIST, OTHER

## 2021-12-06 PROCEDURE — 85025 COMPLETE CBC W/AUTO DIFF WBC: CPT | Performed by: SPECIALIST/TECHNOLOGIST, OTHER

## 2021-12-06 RX ADMIN — ACETAMINOPHEN 650 MG: 325 TABLET ORAL at 05:29

## 2021-12-06 RX ADMIN — ENOXAPARIN SODIUM 40 MG: 40 INJECTION SUBCUTANEOUS at 08:49

## 2021-12-06 RX ADMIN — METOPROLOL SUCCINATE 12.5 MG: 25 TABLET, EXTENDED RELEASE ORAL at 08:48

## 2021-12-06 RX ADMIN — TAMSULOSIN HYDROCHLORIDE 0.4 MG: 0.4 CAPSULE ORAL at 08:54

## 2021-12-06 RX ADMIN — SODIUM CHLORIDE, PRESERVATIVE FREE 2 ML: 5 INJECTION INTRAVENOUS at 20:41

## 2021-12-06 RX ADMIN — ATORVASTATIN CALCIUM 10 MG: 10 TABLET, FILM COATED ORAL at 20:41

## 2021-12-06 RX ADMIN — CEFEPIME HYDROCHLORIDE 1000 MG: 1 INJECTION, POWDER, FOR SOLUTION INTRAMUSCULAR; INTRAVENOUS at 19:05

## 2021-12-06 RX ADMIN — DONEPEZIL HYDROCHLORIDE 5 MG: 5 TABLET, FILM COATED ORAL at 20:41

## 2021-12-06 RX ADMIN — ASPIRIN 81 MG CHEWABLE TABLET 81 MG: 81 TABLET CHEWABLE at 08:48

## 2021-12-06 RX ADMIN — CEFEPIME HYDROCHLORIDE 1000 MG: 1 INJECTION, POWDER, FOR SOLUTION INTRAMUSCULAR; INTRAVENOUS at 05:29

## 2021-12-06 RX ADMIN — ACETAMINOPHEN 650 MG: 325 TABLET ORAL at 13:49

## 2021-12-06 ASSESSMENT — PAIN SCALES - WONG BAKER: WONGBAKER_NUMERICALRESPONSE: 0

## 2021-12-06 ASSESSMENT — PAIN SCALES - PAIN ASSESSMENT IN ADVANCED DEMENTIA (PAINAD)
TOTALSCORE: 1
CONSOLABILITY: NO NEED TO CONSOLE
BREATHING: NORMAL
FACIALEXPRESSION: SMILING OR INEXPRESSIVE
BODYLANGUAGE: TENSE, DISTRESSED, FIDGETING

## 2021-12-06 ASSESSMENT — PAIN SCALES - GENERAL
PAINLEVEL_OUTOF10: 0
PAINLEVEL_OUTOF10: 7
PAINLEVEL_OUTOF10: 0

## 2021-12-07 VITALS
OXYGEN SATURATION: 97 % | RESPIRATION RATE: 16 BRPM | WEIGHT: 180.12 LBS | HEIGHT: 71 IN | HEART RATE: 61 BPM | TEMPERATURE: 98.1 F | SYSTOLIC BLOOD PRESSURE: 101 MMHG | BODY MASS INDEX: 25.22 KG/M2 | DIASTOLIC BLOOD PRESSURE: 60 MMHG

## 2021-12-07 LAB
SARS-COV-2 RNA RESP QL NAA+PROBE: NOT DETECTED
SERVICE CMNT-IMP: NORMAL
SERVICE CMNT-IMP: NORMAL

## 2021-12-07 PROCEDURE — 10002807 HB RX 258: Performed by: STUDENT IN AN ORGANIZED HEALTH CARE EDUCATION/TRAINING PROGRAM

## 2021-12-07 PROCEDURE — 10002803 HB RX 637: Performed by: STUDENT IN AN ORGANIZED HEALTH CARE EDUCATION/TRAINING PROGRAM

## 2021-12-07 PROCEDURE — 10002800 HB RX 250 W HCPCS: Performed by: STUDENT IN AN ORGANIZED HEALTH CARE EDUCATION/TRAINING PROGRAM

## 2021-12-07 PROCEDURE — 10004651 HB RX, NO CHARGE ITEM: Performed by: STUDENT IN AN ORGANIZED HEALTH CARE EDUCATION/TRAINING PROGRAM

## 2021-12-07 PROCEDURE — 13003287

## 2021-12-07 PROCEDURE — 10002803 HB RX 637: Performed by: SPECIALIST/TECHNOLOGIST, OTHER

## 2021-12-07 PROCEDURE — 87635 SARS-COV-2 COVID-19 AMP PRB: CPT | Performed by: STUDENT IN AN ORGANIZED HEALTH CARE EDUCATION/TRAINING PROGRAM

## 2021-12-07 PROCEDURE — 10002800 HB RX 250 W HCPCS: Performed by: SPECIALIST/TECHNOLOGIST, OTHER

## 2021-12-07 RX ADMIN — CEFEPIME HYDROCHLORIDE 1000 MG: 1 INJECTION, POWDER, FOR SOLUTION INTRAMUSCULAR; INTRAVENOUS at 06:29

## 2021-12-07 RX ADMIN — ENOXAPARIN SODIUM 40 MG: 40 INJECTION SUBCUTANEOUS at 08:36

## 2021-12-07 RX ADMIN — ASPIRIN 81 MG CHEWABLE TABLET 81 MG: 81 TABLET CHEWABLE at 08:36

## 2021-12-07 RX ADMIN — METOPROLOL SUCCINATE 12.5 MG: 25 TABLET, EXTENDED RELEASE ORAL at 08:36

## 2021-12-07 RX ADMIN — CEFEPIME HYDROCHLORIDE 1000 MG: 1 INJECTION, POWDER, FOR SOLUTION INTRAMUSCULAR; INTRAVENOUS at 15:35

## 2021-12-07 RX ADMIN — TAMSULOSIN HYDROCHLORIDE 0.4 MG: 0.4 CAPSULE ORAL at 08:36

## 2021-12-07 ASSESSMENT — PAIN SCALES - PAIN ASSESSMENT IN ADVANCED DEMENTIA (PAINAD)
BODYLANGUAGE: RELAXED
BODYLANGUAGE: RELAXED
FACIALEXPRESSION: SMILING OR INEXPRESSIVE
FACIALEXPRESSION: SMILING OR INEXPRESSIVE
BREATHING: NORMAL
BREATHING: NORMAL
CONSOLABILITY: NO NEED TO CONSOLE
CONSOLABILITY: NO NEED TO CONSOLE
TOTALSCORE: 0
TOTALSCORE: 0

## 2021-12-07 ASSESSMENT — MOVEMENT AND STRENGTH ASSESSMENTS
RLE_ASSESSMENT: POOR/COMPLETE ROM WITH GRAVITY ELIMINATED
LUE_ASSESSMENT: GOOD/COMPLETE ROM AGAINST GRAVITY, SOME ADDED RESISTANCE
LLE_ASSESSMENT: FAIR/COMPLETE ROM AGAINST GRAVITY, NO ADDED RESISTANCE
RUE_ASSESSMENT: GOOD/COMPLETE ROM AGAINST GRAVITY, SOME ADDED RESISTANCE

## 2021-12-07 ASSESSMENT — PAIN SCALES - WONG BAKER: WONGBAKER_NUMERICALRESPONSE: 0

## 2022-01-01 ENCOUNTER — HOME CARE VISIT (OUTPATIENT)
Dept: HOSPICE | Facility: HOSPICE | Age: 87
End: 2022-01-01
Payer: MEDICARE

## 2022-01-01 ENCOUNTER — APPOINTMENT (OUTPATIENT)
Dept: RADIOLOGY | Facility: MEDICAL CENTER | Age: 87
DRG: 092 | End: 2022-01-01
Attending: EMERGENCY MEDICINE
Payer: MEDICARE

## 2022-01-01 ENCOUNTER — APPOINTMENT (OUTPATIENT)
Dept: RADIOLOGY | Facility: MEDICAL CENTER | Age: 87
DRG: 092 | End: 2022-01-01
Attending: INTERNAL MEDICINE
Payer: MEDICARE

## 2022-01-01 ENCOUNTER — HOSPITAL ENCOUNTER (INPATIENT)
Facility: MEDICAL CENTER | Age: 87
LOS: 14 days | DRG: 698 | End: 2022-08-29
Attending: EMERGENCY MEDICINE | Admitting: INTERNAL MEDICINE
Payer: MEDICARE

## 2022-01-01 ENCOUNTER — PHARMACY VISIT (OUTPATIENT)
Dept: PHARMACY | Facility: MEDICAL CENTER | Age: 87
End: 2022-01-01
Payer: COMMERCIAL

## 2022-01-01 ENCOUNTER — HOSPICE ADMISSION (OUTPATIENT)
Dept: HOSPICE | Facility: HOSPICE | Age: 87
End: 2022-01-01
Payer: MEDICARE

## 2022-01-01 ENCOUNTER — HOSPITAL ENCOUNTER (EMERGENCY)
Facility: MEDICAL CENTER | Age: 87
End: 2022-08-01
Attending: STUDENT IN AN ORGANIZED HEALTH CARE EDUCATION/TRAINING PROGRAM
Payer: MEDICARE

## 2022-01-01 ENCOUNTER — APPOINTMENT (OUTPATIENT)
Dept: RADIOLOGY | Facility: MEDICAL CENTER | Age: 87
DRG: 092 | End: 2022-01-01
Attending: STUDENT IN AN ORGANIZED HEALTH CARE EDUCATION/TRAINING PROGRAM
Payer: MEDICARE

## 2022-01-01 ENCOUNTER — HOSPITAL ENCOUNTER (INPATIENT)
Facility: MEDICAL CENTER | Age: 87
LOS: 4 days | DRG: 092 | End: 2022-07-31
Attending: EMERGENCY MEDICINE | Admitting: STUDENT IN AN ORGANIZED HEALTH CARE EDUCATION/TRAINING PROGRAM
Payer: MEDICARE

## 2022-01-01 ENCOUNTER — PATIENT OUTREACH (OUTPATIENT)
Dept: SCHEDULING | Facility: IMAGING CENTER | Age: 87
End: 2022-01-01
Payer: MEDICARE

## 2022-01-01 ENCOUNTER — APPOINTMENT (OUTPATIENT)
Dept: RADIOLOGY | Facility: MEDICAL CENTER | Age: 87
End: 2022-01-01
Attending: STUDENT IN AN ORGANIZED HEALTH CARE EDUCATION/TRAINING PROGRAM
Payer: MEDICARE

## 2022-01-01 VITALS
OXYGEN SATURATION: 97 % | WEIGHT: 173.94 LBS | HEIGHT: 75 IN | BODY MASS INDEX: 21.63 KG/M2 | SYSTOLIC BLOOD PRESSURE: 97 MMHG | DIASTOLIC BLOOD PRESSURE: 55 MMHG | RESPIRATION RATE: 17 BRPM | HEART RATE: 59 BPM | TEMPERATURE: 97.8 F

## 2022-01-01 VITALS — SYSTOLIC BLOOD PRESSURE: 112 MMHG | DIASTOLIC BLOOD PRESSURE: 60 MMHG | HEART RATE: 72 BPM | RESPIRATION RATE: 20 BRPM

## 2022-01-01 VITALS
TEMPERATURE: 98.4 F | HEART RATE: 86 BPM | SYSTOLIC BLOOD PRESSURE: 118 MMHG | OXYGEN SATURATION: 92 % | HEIGHT: 71 IN | DIASTOLIC BLOOD PRESSURE: 71 MMHG | BODY MASS INDEX: 25.2 KG/M2 | RESPIRATION RATE: 16 BRPM | WEIGHT: 180 LBS

## 2022-01-01 VITALS — SYSTOLIC BLOOD PRESSURE: 100 MMHG | DIASTOLIC BLOOD PRESSURE: 80 MMHG | HEART RATE: 68 BPM | RESPIRATION RATE: 18 BRPM

## 2022-01-01 VITALS
BODY MASS INDEX: 25.83 KG/M2 | RESPIRATION RATE: 18 BRPM | HEIGHT: 71 IN | WEIGHT: 184.53 LBS | SYSTOLIC BLOOD PRESSURE: 142 MMHG | HEART RATE: 60 BPM | DIASTOLIC BLOOD PRESSURE: 78 MMHG | OXYGEN SATURATION: 93 % | TEMPERATURE: 97.5 F

## 2022-01-01 VITALS — SYSTOLIC BLOOD PRESSURE: 108 MMHG | RESPIRATION RATE: 20 BRPM | HEART RATE: 80 BPM | DIASTOLIC BLOOD PRESSURE: 64 MMHG

## 2022-01-01 VITALS — RESPIRATION RATE: 20 BRPM | HEART RATE: 92 BPM | TEMPERATURE: 98.3 F

## 2022-01-01 VITALS — HEART RATE: 92 BPM | RESPIRATION RATE: 18 BRPM | SYSTOLIC BLOOD PRESSURE: 92 MMHG | DIASTOLIC BLOOD PRESSURE: 48 MMHG

## 2022-01-01 VITALS — HEART RATE: 68 BPM | RESPIRATION RATE: 20 BRPM | SYSTOLIC BLOOD PRESSURE: 112 MMHG | DIASTOLIC BLOOD PRESSURE: 60 MMHG

## 2022-01-01 VITALS — RESPIRATION RATE: 32 BRPM | HEART RATE: 100 BPM

## 2022-01-01 DIAGNOSIS — F03.90 DEMENTIA WITHOUT BEHAVIORAL DISTURBANCE, UNSPECIFIED DEMENTIA TYPE: ICD-10-CM

## 2022-01-01 DIAGNOSIS — G31.83 LEWY BODY DEMENTIA WITHOUT BEHAVIORAL DISTURBANCE (HCC): ICD-10-CM

## 2022-01-01 DIAGNOSIS — A49.8 PSEUDOMONAS AERUGINOSA INFECTION: ICD-10-CM

## 2022-01-01 DIAGNOSIS — N30.00 ACUTE CYSTITIS WITHOUT HEMATURIA: ICD-10-CM

## 2022-01-01 DIAGNOSIS — N39.0 URINARY TRACT INFECTION WITHOUT HEMATURIA, SITE UNSPECIFIED: ICD-10-CM

## 2022-01-01 DIAGNOSIS — Z51.5 HOSPICE CARE: ICD-10-CM

## 2022-01-01 DIAGNOSIS — R33.9 URINARY RETENTION: ICD-10-CM

## 2022-01-01 DIAGNOSIS — S31.20XA OPEN WOUND OF PENIS, INITIAL ENCOUNTER: ICD-10-CM

## 2022-01-01 DIAGNOSIS — R27.0 ATAXIA: ICD-10-CM

## 2022-01-01 DIAGNOSIS — F02.80 LEWY BODY DEMENTIA WITHOUT BEHAVIORAL DISTURBANCE (HCC): ICD-10-CM

## 2022-01-01 DIAGNOSIS — S09.90XA CLOSED HEAD INJURY, INITIAL ENCOUNTER: ICD-10-CM

## 2022-01-01 DIAGNOSIS — R42 DIZZINESS: ICD-10-CM

## 2022-01-01 DIAGNOSIS — N39.0 URINARY TRACT INFECTION ASSOCIATED WITH INDWELLING URETHRAL CATHETER, INITIAL ENCOUNTER (HCC): ICD-10-CM

## 2022-01-01 DIAGNOSIS — F02.80 LEWY BODY DEMENTIA WITHOUT BEHAVIORAL DISTURBANCE (HCC): Primary | ICD-10-CM

## 2022-01-01 DIAGNOSIS — G31.83 LEWY BODY DEMENTIA WITHOUT BEHAVIORAL DISTURBANCE (HCC): Primary | ICD-10-CM

## 2022-01-01 DIAGNOSIS — E55.9 VITAMIN D DEFICIENCY: ICD-10-CM

## 2022-01-01 DIAGNOSIS — A49.8 ENTEROCOCCUS FAECALIS INFECTION: ICD-10-CM

## 2022-01-01 DIAGNOSIS — T83.511A URINARY TRACT INFECTION ASSOCIATED WITH INDWELLING URETHRAL CATHETER, INITIAL ENCOUNTER (HCC): ICD-10-CM

## 2022-01-01 DIAGNOSIS — T07.XXXA MULTIPLE EXCORIATIONS: ICD-10-CM
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
## 2022-01-01 LAB
25(OH)D3 SERPL-MCNC: 23 NG/ML (ref 30–100)
ALBUMIN SERPL BCP-MCNC: 2.6 G/DL (ref 3.2–4.9)
ALBUMIN SERPL BCP-MCNC: 3.8 G/DL (ref 3.2–4.9)
ALBUMIN/GLOB SERPL: 0.8 G/DL
ALBUMIN/GLOB SERPL: 1.4 G/DL
ALP SERPL-CCNC: 47 U/L (ref 30–99)
ALP SERPL-CCNC: 56 U/L (ref 30–99)
ALT SERPL-CCNC: 10 U/L (ref 2–50)
ALT SERPL-CCNC: 11 U/L (ref 2–50)
AMMONIA PLAS-SCNC: 16 UMOL/L (ref 11–45)
AMPHET UR QL SCN: NEGATIVE
ANION GAP SERPL CALC-SCNC: 10 MMOL/L (ref 7–16)
ANION GAP SERPL CALC-SCNC: 11 MMOL/L (ref 7–16)
ANION GAP SERPL CALC-SCNC: 12 MMOL/L (ref 7–16)
ANION GAP SERPL CALC-SCNC: 12 MMOL/L (ref 7–16)
ANION GAP SERPL CALC-SCNC: 13 MMOL/L (ref 7–16)
APPEARANCE UR: ABNORMAL
APPEARANCE UR: ABNORMAL
AST SERPL-CCNC: 21 U/L (ref 12–45)
AST SERPL-CCNC: 23 U/L (ref 12–45)
BACTERIA #/AREA URNS HPF: ABNORMAL /HPF
BACTERIA #/AREA URNS HPF: ABNORMAL /HPF
BACTERIA BLD CULT: NORMAL
BACTERIA UR CULT: ABNORMAL
BARBITURATES UR QL SCN: NEGATIVE
BASOPHILS # BLD AUTO: 0.3 % (ref 0–1.8)
BASOPHILS # BLD AUTO: 0.3 % (ref 0–1.8)
BASOPHILS # BLD AUTO: 0.4 % (ref 0–1.8)
BASOPHILS # BLD AUTO: 0.5 % (ref 0–1.8)
BASOPHILS # BLD AUTO: 0.5 % (ref 0–1.8)
BASOPHILS # BLD: 0.03 K/UL (ref 0–0.12)
BASOPHILS # BLD: 0.04 K/UL (ref 0–0.12)
BENZODIAZ UR QL SCN: NEGATIVE
BILIRUB SERPL-MCNC: 0.8 MG/DL (ref 0.1–1.5)
BILIRUB SERPL-MCNC: 1 MG/DL (ref 0.1–1.5)
BILIRUB UR QL STRIP.AUTO: NEGATIVE
BILIRUB UR QL STRIP.AUTO: NEGATIVE
BLOOD CULTURE HOLD CXBCH: NORMAL
BUN SERPL-MCNC: 12 MG/DL (ref 8–22)
BUN SERPL-MCNC: 12 MG/DL (ref 8–22)
BUN SERPL-MCNC: 15 MG/DL (ref 8–22)
BUN SERPL-MCNC: 16 MG/DL (ref 8–22)
BUN SERPL-MCNC: 18 MG/DL (ref 8–22)
BUN SERPL-MCNC: 18 MG/DL (ref 8–22)
BUN SERPL-MCNC: 19 MG/DL (ref 8–22)
BUN SERPL-MCNC: 20 MG/DL (ref 8–22)
BZE UR QL SCN: NEGATIVE
CALCIUM SERPL-MCNC: 8.4 MG/DL (ref 8.5–10.5)
CALCIUM SERPL-MCNC: 8.4 MG/DL (ref 8.5–10.5)
CALCIUM SERPL-MCNC: 8.5 MG/DL (ref 8.5–10.5)
CALCIUM SERPL-MCNC: 8.6 MG/DL (ref 8.5–10.5)
CALCIUM SERPL-MCNC: 8.7 MG/DL (ref 8.5–10.5)
CALCIUM SERPL-MCNC: 8.7 MG/DL (ref 8.5–10.5)
CALCIUM SERPL-MCNC: 8.8 MG/DL (ref 8.5–10.5)
CALCIUM SERPL-MCNC: 9 MG/DL (ref 8.5–10.5)
CANNABINOIDS UR QL SCN: NEGATIVE
CHLORIDE SERPL-SCNC: 105 MMOL/L (ref 96–112)
CHLORIDE SERPL-SCNC: 106 MMOL/L (ref 96–112)
CHLORIDE SERPL-SCNC: 106 MMOL/L (ref 96–112)
CHLORIDE SERPL-SCNC: 107 MMOL/L (ref 96–112)
CHLORIDE SERPL-SCNC: 108 MMOL/L (ref 96–112)
CHLORIDE SERPL-SCNC: 108 MMOL/L (ref 96–112)
CHLORIDE SERPL-SCNC: 110 MMOL/L (ref 96–112)
CHLORIDE SERPL-SCNC: 111 MMOL/L (ref 96–112)
CHLORIDE SERPL-SCNC: 112 MMOL/L (ref 96–112)
CHLORIDE SERPL-SCNC: 114 MMOL/L (ref 96–112)
CHOLEST SERPL-MCNC: 114 MG/DL (ref 100–199)
CO2 SERPL-SCNC: 18 MMOL/L (ref 20–33)
CO2 SERPL-SCNC: 20 MMOL/L (ref 20–33)
CO2 SERPL-SCNC: 21 MMOL/L (ref 20–33)
CO2 SERPL-SCNC: 22 MMOL/L (ref 20–33)
CO2 SERPL-SCNC: 23 MMOL/L (ref 20–33)
COLOR UR: YELLOW
COLOR UR: YELLOW
CREAT SERPL-MCNC: 0.86 MG/DL (ref 0.5–1.4)
CREAT SERPL-MCNC: 0.9 MG/DL (ref 0.5–1.4)
CREAT SERPL-MCNC: 0.93 MG/DL (ref 0.5–1.4)
CREAT SERPL-MCNC: 1.01 MG/DL (ref 0.5–1.4)
CREAT SERPL-MCNC: 1.01 MG/DL (ref 0.5–1.4)
CREAT SERPL-MCNC: 1.02 MG/DL (ref 0.5–1.4)
CREAT SERPL-MCNC: 1.02 MG/DL (ref 0.5–1.4)
CREAT SERPL-MCNC: 1.04 MG/DL (ref 0.5–1.4)
CREAT SERPL-MCNC: 1.06 MG/DL (ref 0.5–1.4)
CREAT SERPL-MCNC: 1.21 MG/DL (ref 0.5–1.4)
EKG IMPRESSION: NORMAL
EOSINOPHIL # BLD AUTO: 0.14 K/UL (ref 0–0.51)
EOSINOPHIL # BLD AUTO: 0.15 K/UL (ref 0–0.51)
EOSINOPHIL # BLD AUTO: 0.32 K/UL (ref 0–0.51)
EOSINOPHIL # BLD AUTO: 0.36 K/UL (ref 0–0.51)
EOSINOPHIL # BLD AUTO: 0.47 K/UL (ref 0–0.51)
EOSINOPHIL NFR BLD: 1.4 % (ref 0–6.9)
EOSINOPHIL NFR BLD: 1.5 % (ref 0–6.9)
EOSINOPHIL NFR BLD: 4.2 % (ref 0–6.9)
EOSINOPHIL NFR BLD: 4.8 % (ref 0–6.9)
EOSINOPHIL NFR BLD: 7.1 % (ref 0–6.9)
EPI CELLS #/AREA URNS HPF: ABNORMAL /HPF
EPI CELLS #/AREA URNS HPF: NEGATIVE /HPF
ERYTHROCYTE [DISTWIDTH] IN BLOOD BY AUTOMATED COUNT: 40.4 FL (ref 35.9–50)
ERYTHROCYTE [DISTWIDTH] IN BLOOD BY AUTOMATED COUNT: 41.5 FL (ref 35.9–50)
ERYTHROCYTE [DISTWIDTH] IN BLOOD BY AUTOMATED COUNT: 42.2 FL (ref 35.9–50)
ERYTHROCYTE [DISTWIDTH] IN BLOOD BY AUTOMATED COUNT: 42.5 FL (ref 35.9–50)
ERYTHROCYTE [DISTWIDTH] IN BLOOD BY AUTOMATED COUNT: 42.8 FL (ref 35.9–50)
ERYTHROCYTE [DISTWIDTH] IN BLOOD BY AUTOMATED COUNT: 42.8 FL (ref 35.9–50)
ERYTHROCYTE [DISTWIDTH] IN BLOOD BY AUTOMATED COUNT: 43.3 FL (ref 35.9–50)
ERYTHROCYTE [DISTWIDTH] IN BLOOD BY AUTOMATED COUNT: 43.5 FL (ref 35.9–50)
EST. AVERAGE GLUCOSE BLD GHB EST-MCNC: 103 MG/DL
ETHANOL BLD-MCNC: <10.1 MG/DL
FLUAV RNA SPEC QL NAA+PROBE: NEGATIVE
FLUAV RNA SPEC QL NAA+PROBE: NEGATIVE
FLUBV RNA SPEC QL NAA+PROBE: NEGATIVE
FLUBV RNA SPEC QL NAA+PROBE: NEGATIVE
GAMMA INTERFERON BACKGROUND BLD IA-ACNC: 0.04 IU/ML
GFR SERPLBLD CREATININE-BSD FMLA CKD-EPI: 58 ML/MIN/1.73 M 2
GFR SERPLBLD CREATININE-BSD FMLA CKD-EPI: 67 ML/MIN/1.73 M 2
GFR SERPLBLD CREATININE-BSD FMLA CKD-EPI: 69 ML/MIN/1.73 M 2
GFR SERPLBLD CREATININE-BSD FMLA CKD-EPI: 71 ML/MIN/1.73 M 2
GFR SERPLBLD CREATININE-BSD FMLA CKD-EPI: 71 ML/MIN/1.73 M 2
GFR SERPLBLD CREATININE-BSD FMLA CKD-EPI: 72 ML/MIN/1.73 M 2
GFR SERPLBLD CREATININE-BSD FMLA CKD-EPI: 72 ML/MIN/1.73 M 2
GFR SERPLBLD CREATININE-BSD FMLA CKD-EPI: 79 ML/MIN/1.73 M 2
GFR SERPLBLD CREATININE-BSD FMLA CKD-EPI: 82 ML/MIN/1.73 M 2
GFR SERPLBLD CREATININE-BSD FMLA CKD-EPI: 83 ML/MIN/1.73 M 2
GLOBULIN SER CALC-MCNC: 2.7 G/DL (ref 1.9–3.5)
GLOBULIN SER CALC-MCNC: 3.2 G/DL (ref 1.9–3.5)
GLUCOSE SERPL-MCNC: 111 MG/DL (ref 65–99)
GLUCOSE SERPL-MCNC: 114 MG/DL (ref 65–99)
GLUCOSE SERPL-MCNC: 117 MG/DL (ref 65–99)
GLUCOSE SERPL-MCNC: 118 MG/DL (ref 65–99)
GLUCOSE SERPL-MCNC: 126 MG/DL (ref 65–99)
GLUCOSE SERPL-MCNC: 128 MG/DL (ref 65–99)
GLUCOSE SERPL-MCNC: 72 MG/DL (ref 65–99)
GLUCOSE SERPL-MCNC: 83 MG/DL (ref 65–99)
GLUCOSE SERPL-MCNC: 91 MG/DL (ref 65–99)
GLUCOSE SERPL-MCNC: 92 MG/DL (ref 65–99)
GLUCOSE UR STRIP.AUTO-MCNC: NEGATIVE MG/DL
GLUCOSE UR STRIP.AUTO-MCNC: NEGATIVE MG/DL
HBA1C MFR BLD: 5.2 % (ref 4–5.6)
HCT VFR BLD AUTO: 33 % (ref 42–52)
HCT VFR BLD AUTO: 33.8 % (ref 42–52)
HCT VFR BLD AUTO: 34.9 % (ref 42–52)
HCT VFR BLD AUTO: 36 % (ref 42–52)
HCT VFR BLD AUTO: 36.7 % (ref 42–52)
HCT VFR BLD AUTO: 36.8 % (ref 42–52)
HCT VFR BLD AUTO: 37.1 % (ref 42–52)
HCT VFR BLD AUTO: 39.9 % (ref 42–52)
HDLC SERPL-MCNC: 41 MG/DL
HGB BLD-MCNC: 11 G/DL (ref 14–18)
HGB BLD-MCNC: 11.6 G/DL (ref 14–18)
HGB BLD-MCNC: 11.7 G/DL (ref 14–18)
HGB BLD-MCNC: 11.9 G/DL (ref 14–18)
HGB BLD-MCNC: 11.9 G/DL (ref 14–18)
HGB BLD-MCNC: 12.4 G/DL (ref 14–18)
HGB BLD-MCNC: 12.6 G/DL (ref 14–18)
HGB BLD-MCNC: 13.3 G/DL (ref 14–18)
HYALINE CASTS #/AREA URNS LPF: ABNORMAL /LPF
IMM GRANULOCYTES # BLD AUTO: 0.03 K/UL (ref 0–0.11)
IMM GRANULOCYTES # BLD AUTO: 0.07 K/UL (ref 0–0.11)
IMM GRANULOCYTES # BLD AUTO: 0.07 K/UL (ref 0–0.11)
IMM GRANULOCYTES # BLD AUTO: 0.08 K/UL (ref 0–0.11)
IMM GRANULOCYTES # BLD AUTO: 0.09 K/UL (ref 0–0.11)
IMM GRANULOCYTES NFR BLD AUTO: 0.4 % (ref 0–0.9)
IMM GRANULOCYTES NFR BLD AUTO: 0.7 % (ref 0–0.9)
IMM GRANULOCYTES NFR BLD AUTO: 0.9 % (ref 0–0.9)
IMM GRANULOCYTES NFR BLD AUTO: 1 % (ref 0–0.9)
IMM GRANULOCYTES NFR BLD AUTO: 1.1 % (ref 0–0.9)
KETONES UR STRIP.AUTO-MCNC: 15 MG/DL
KETONES UR STRIP.AUTO-MCNC: NEGATIVE MG/DL
LDLC SERPL CALC-MCNC: 61 MG/DL
LEUKOCYTE ESTERASE UR QL STRIP.AUTO: ABNORMAL
LEUKOCYTE ESTERASE UR QL STRIP.AUTO: ABNORMAL
LYMPHOCYTES # BLD AUTO: 1.28 K/UL (ref 1–4.8)
LYMPHOCYTES # BLD AUTO: 1.42 K/UL (ref 1–4.8)
LYMPHOCYTES # BLD AUTO: 1.53 K/UL (ref 1–4.8)
LYMPHOCYTES # BLD AUTO: 1.53 K/UL (ref 1–4.8)
LYMPHOCYTES # BLD AUTO: 1.59 K/UL (ref 1–4.8)
LYMPHOCYTES NFR BLD: 13.2 % (ref 22–41)
LYMPHOCYTES NFR BLD: 15.1 % (ref 22–41)
LYMPHOCYTES NFR BLD: 19 % (ref 22–41)
LYMPHOCYTES NFR BLD: 19.8 % (ref 22–41)
LYMPHOCYTES NFR BLD: 23.9 % (ref 22–41)
M TB IFN-G BLD-IMP: NEGATIVE
M TB IFN-G CD4+ BCKGRND COR BLD-ACNC: 0 IU/ML
MAGNESIUM SERPL-MCNC: 1.8 MG/DL (ref 1.5–2.5)
MCH RBC QN AUTO: 30.7 PG (ref 27–33)
MCH RBC QN AUTO: 31 PG (ref 27–33)
MCH RBC QN AUTO: 31.2 PG (ref 27–33)
MCH RBC QN AUTO: 31.3 PG (ref 27–33)
MCH RBC QN AUTO: 31.6 PG (ref 27–33)
MCH RBC QN AUTO: 32.3 PG (ref 27–33)
MCHC RBC AUTO-ENTMCNC: 32.4 G/DL (ref 33.7–35.3)
MCHC RBC AUTO-ENTMCNC: 33.1 G/DL (ref 33.7–35.3)
MCHC RBC AUTO-ENTMCNC: 33.2 G/DL (ref 33.7–35.3)
MCHC RBC AUTO-ENTMCNC: 33.3 G/DL (ref 33.7–35.3)
MCHC RBC AUTO-ENTMCNC: 33.3 G/DL (ref 33.7–35.3)
MCHC RBC AUTO-ENTMCNC: 33.4 G/DL (ref 33.7–35.3)
MCHC RBC AUTO-ENTMCNC: 34.2 G/DL (ref 33.7–35.3)
MCHC RBC AUTO-ENTMCNC: 34.6 G/DL (ref 33.7–35.3)
MCV RBC AUTO: 91.3 FL (ref 81.4–97.8)
MCV RBC AUTO: 92.3 FL (ref 81.4–97.8)
MCV RBC AUTO: 93 FL (ref 81.4–97.8)
MCV RBC AUTO: 93 FL (ref 81.4–97.8)
MCV RBC AUTO: 93.4 FL (ref 81.4–97.8)
MCV RBC AUTO: 93.5 FL (ref 81.4–97.8)
MCV RBC AUTO: 94.4 FL (ref 81.4–97.8)
MCV RBC AUTO: 94.8 FL (ref 81.4–97.8)
METHADONE UR QL SCN: NEGATIVE
MICRO URNS: ABNORMAL
MICRO URNS: ABNORMAL
MITOGEN IGNF BCKGRD COR BLD-ACNC: >10 IU/ML
MONOCYTES # BLD AUTO: 0.73 K/UL (ref 0–0.85)
MONOCYTES # BLD AUTO: 0.79 K/UL (ref 0–0.85)
MONOCYTES # BLD AUTO: 0.92 K/UL (ref 0–0.85)
MONOCYTES # BLD AUTO: 1.07 K/UL (ref 0–0.85)
MONOCYTES # BLD AUTO: 1.15 K/UL (ref 0–0.85)
MONOCYTES NFR BLD AUTO: 10.2 % (ref 0–13.4)
MONOCYTES NFR BLD AUTO: 10.6 % (ref 0–13.4)
MONOCYTES NFR BLD AUTO: 11 % (ref 0–13.4)
MONOCYTES NFR BLD AUTO: 11.9 % (ref 0–13.4)
MONOCYTES NFR BLD AUTO: 12.3 % (ref 0–13.4)
NEUTROPHILS # BLD AUTO: 3.77 K/UL (ref 1.82–7.42)
NEUTROPHILS # BLD AUTO: 4.7 K/UL (ref 1.82–7.42)
NEUTROPHILS # BLD AUTO: 4.96 K/UL (ref 1.82–7.42)
NEUTROPHILS # BLD AUTO: 7.02 K/UL (ref 1.82–7.42)
NEUTROPHILS # BLD AUTO: 7.24 K/UL (ref 1.82–7.42)
NEUTROPHILS NFR BLD: 56.4 % (ref 44–72)
NEUTROPHILS NFR BLD: 63 % (ref 44–72)
NEUTROPHILS NFR BLD: 64.4 % (ref 44–72)
NEUTROPHILS NFR BLD: 71.6 % (ref 44–72)
NEUTROPHILS NFR BLD: 72.5 % (ref 44–72)
NITRITE UR QL STRIP.AUTO: NEGATIVE
NITRITE UR QL STRIP.AUTO: POSITIVE
NRBC # BLD AUTO: 0 K/UL
NRBC BLD-RTO: 0 /100 WBC
OPIATES UR QL SCN: NEGATIVE
OXYCODONE UR QL SCN: NEGATIVE
PCP UR QL SCN: NEGATIVE
PH UR STRIP.AUTO: 6 [PH] (ref 5–8)
PH UR STRIP.AUTO: 6.5 [PH] (ref 5–8)
PHOSPHATE SERPL-MCNC: 3.5 MG/DL (ref 2.5–4.5)
PLATELET # BLD AUTO: 254 K/UL (ref 164–446)
PLATELET # BLD AUTO: 282 K/UL (ref 164–446)
PLATELET # BLD AUTO: 305 K/UL (ref 164–446)
PLATELET # BLD AUTO: 331 K/UL (ref 164–446)
PLATELET # BLD AUTO: 336 K/UL (ref 164–446)
PLATELET # BLD AUTO: 345 K/UL (ref 164–446)
PLATELET # BLD AUTO: 357 K/UL (ref 164–446)
PLATELET # BLD AUTO: 379 K/UL (ref 164–446)
PMV BLD AUTO: 10 FL (ref 9–12.9)
PMV BLD AUTO: 10 FL (ref 9–12.9)
PMV BLD AUTO: 10.2 FL (ref 9–12.9)
PMV BLD AUTO: 10.4 FL (ref 9–12.9)
PMV BLD AUTO: 10.4 FL (ref 9–12.9)
PMV BLD AUTO: 9.5 FL (ref 9–12.9)
PMV BLD AUTO: 9.7 FL (ref 9–12.9)
PMV BLD AUTO: 9.8 FL (ref 9–12.9)
POTASSIUM SERPL-SCNC: 3.5 MMOL/L (ref 3.6–5.5)
POTASSIUM SERPL-SCNC: 3.6 MMOL/L (ref 3.6–5.5)
POTASSIUM SERPL-SCNC: 3.7 MMOL/L (ref 3.6–5.5)
POTASSIUM SERPL-SCNC: 3.7 MMOL/L (ref 3.6–5.5)
POTASSIUM SERPL-SCNC: 3.8 MMOL/L (ref 3.6–5.5)
POTASSIUM SERPL-SCNC: 3.9 MMOL/L (ref 3.6–5.5)
POTASSIUM SERPL-SCNC: 3.9 MMOL/L (ref 3.6–5.5)
POTASSIUM SERPL-SCNC: 4.1 MMOL/L (ref 3.6–5.5)
POTASSIUM SERPL-SCNC: 4.2 MMOL/L (ref 3.6–5.5)
POTASSIUM SERPL-SCNC: 4.4 MMOL/L (ref 3.6–5.5)
PROPOXYPH UR QL SCN: NEGATIVE
PROT SERPL-MCNC: 5.8 G/DL (ref 6–8.2)
PROT SERPL-MCNC: 6.5 G/DL (ref 6–8.2)
PROT UR QL STRIP: 30 MG/DL
PROT UR QL STRIP: NEGATIVE MG/DL
QFT TB2 - NIL TBQ2: 0 IU/ML
RBC # BLD AUTO: 3.53 M/UL (ref 4.7–6.1)
RBC # BLD AUTO: 3.62 M/UL (ref 4.7–6.1)
RBC # BLD AUTO: 3.78 M/UL (ref 4.7–6.1)
RBC # BLD AUTO: 3.87 M/UL (ref 4.7–6.1)
RBC # BLD AUTO: 3.87 M/UL (ref 4.7–6.1)
RBC # BLD AUTO: 3.93 M/UL (ref 4.7–6.1)
RBC # BLD AUTO: 4.03 M/UL (ref 4.7–6.1)
RBC # BLD AUTO: 4.29 M/UL (ref 4.7–6.1)
RBC # URNS HPF: ABNORMAL /HPF
RBC # URNS HPF: ABNORMAL /HPF
RBC UR QL AUTO: ABNORMAL
RBC UR QL AUTO: ABNORMAL
RSV RNA SPEC QL NAA+PROBE: NEGATIVE
SARS-COV-2 RNA RESP QL NAA+PROBE: DETECTED
SARS-COV-2 RNA RESP QL NAA+PROBE: DETECTED
SCCMEC + MECA PNL NOSE NAA+PROBE: NEGATIVE
SCCMEC + MECA PNL NOSE NAA+PROBE: POSITIVE
SIGNIFICANT IND 70042: ABNORMAL
SIGNIFICANT IND 70042: ABNORMAL
SIGNIFICANT IND 70042: NORMAL
SITE SITE: ABNORMAL
SITE SITE: ABNORMAL
SITE SITE: NORMAL
SODIUM SERPL-SCNC: 137 MMOL/L (ref 135–145)
SODIUM SERPL-SCNC: 138 MMOL/L (ref 135–145)
SODIUM SERPL-SCNC: 138 MMOL/L (ref 135–145)
SODIUM SERPL-SCNC: 139 MMOL/L (ref 135–145)
SODIUM SERPL-SCNC: 139 MMOL/L (ref 135–145)
SODIUM SERPL-SCNC: 141 MMOL/L (ref 135–145)
SODIUM SERPL-SCNC: 142 MMOL/L (ref 135–145)
SODIUM SERPL-SCNC: 142 MMOL/L (ref 135–145)
SOURCE SOURCE: ABNORMAL
SOURCE SOURCE: ABNORMAL
SOURCE SOURCE: NORMAL
SP GR UR STRIP.AUTO: 1.01
SP GR UR STRIP.AUTO: 1.02
SPECIMEN SOURCE: ABNORMAL
SPECIMEN SOURCE: ABNORMAL
TRIGL SERPL-MCNC: 61 MG/DL (ref 0–149)
TSH SERPL DL<=0.005 MIU/L-ACNC: 2.17 UIU/ML (ref 0.38–5.33)
UROBILINOGEN UR STRIP.AUTO-MCNC: 1 MG/DL
UROBILINOGEN UR STRIP.AUTO-MCNC: 1 MG/DL
VIT B12 SERPL-MCNC: 931 PG/ML (ref 211–911)
WBC # BLD AUTO: 10.1 K/UL (ref 4.8–10.8)
WBC # BLD AUTO: 6.1 K/UL (ref 4.8–10.8)
WBC # BLD AUTO: 6.7 K/UL (ref 4.8–10.8)
WBC # BLD AUTO: 7.5 K/UL (ref 4.8–10.8)
WBC # BLD AUTO: 7.6 K/UL (ref 4.8–10.8)
WBC # BLD AUTO: 7.7 K/UL (ref 4.8–10.8)
WBC # BLD AUTO: 9.1 K/UL (ref 4.8–10.8)
WBC # BLD AUTO: 9.7 K/UL (ref 4.8–10.8)
WBC #/AREA URNS HPF: ABNORMAL /HPF
WBC #/AREA URNS HPF: ABNORMAL /HPF

## 2022-01-01 PROCEDURE — 700105 HCHG RX REV CODE 258: Performed by: INTERNAL MEDICINE

## 2022-01-01 PROCEDURE — RXMED WILLOW AMBULATORY MEDICATION CHARGE: Performed by: STUDENT IN AN ORGANIZED HEALTH CARE EDUCATION/TRAINING PROGRAM

## 2022-01-01 PROCEDURE — 80048 BASIC METABOLIC PNL TOTAL CA: CPT

## 2022-01-01 PROCEDURE — 700102 HCHG RX REV CODE 250 W/ 637 OVERRIDE(OP): Performed by: STUDENT IN AN ORGANIZED HEALTH CARE EDUCATION/TRAINING PROGRAM

## 2022-01-01 PROCEDURE — 92526 ORAL FUNCTION THERAPY: CPT

## 2022-01-01 PROCEDURE — 36415 COLL VENOUS BLD VENIPUNCTURE: CPT

## 2022-01-01 PROCEDURE — 85027 COMPLETE CBC AUTOMATED: CPT

## 2022-01-01 PROCEDURE — 770001 HCHG ROOM/CARE - MED/SURG/GYN PRIV*

## 2022-01-01 PROCEDURE — A9270 NON-COVERED ITEM OR SERVICE: HCPCS | Performed by: STUDENT IN AN ORGANIZED HEALTH CARE EDUCATION/TRAINING PROGRAM

## 2022-01-01 PROCEDURE — 87640 STAPH A DNA AMP PROBE: CPT

## 2022-01-01 PROCEDURE — 91305 HCHG RX REV CODE 636 W/ 250 OVERRIDE (IP): CPT | Performed by: NURSE PRACTITIONER

## 2022-01-01 PROCEDURE — 700111 HCHG RX REV CODE 636 W/ 250 OVERRIDE (IP): Performed by: INTERNAL MEDICINE

## 2022-01-01 PROCEDURE — 99497 ADVNCD CARE PLAN 30 MIN: CPT | Performed by: NURSE PRACTITIONER

## 2022-01-01 PROCEDURE — 99232 SBSQ HOSP IP/OBS MODERATE 35: CPT | Performed by: NURSE PRACTITIONER

## 2022-01-01 PROCEDURE — 700102 HCHG RX REV CODE 250 W/ 637 OVERRIDE(OP): Performed by: NURSE PRACTITIONER

## 2022-01-01 PROCEDURE — 80053 COMPREHEN METABOLIC PANEL: CPT

## 2022-01-01 PROCEDURE — 87077 CULTURE AEROBIC IDENTIFY: CPT

## 2022-01-01 PROCEDURE — 99239 HOSP IP/OBS DSCHRG MGMT >30: CPT | Performed by: INTERNAL MEDICINE

## 2022-01-01 PROCEDURE — 700102 HCHG RX REV CODE 250 W/ 637 OVERRIDE(OP): Performed by: INTERNAL MEDICINE

## 2022-01-01 PROCEDURE — 97167 OT EVAL HIGH COMPLEX 60 MIN: CPT

## 2022-01-01 PROCEDURE — 85025 COMPLETE CBC W/AUTO DIFF WBC: CPT

## 2022-01-01 PROCEDURE — 96366 THER/PROPH/DIAG IV INF ADDON: CPT

## 2022-01-01 PROCEDURE — 51798 US URINE CAPACITY MEASURE: CPT

## 2022-01-01 PROCEDURE — 93922 UPR/L XTREMITY ART 2 LEVELS: CPT

## 2022-01-01 PROCEDURE — S9126 HOSPICE CARE, IN THE HOME, P: HCPCS

## 2022-01-01 PROCEDURE — A9270 NON-COVERED ITEM OR SERVICE: HCPCS | Performed by: INTERNAL MEDICINE

## 2022-01-01 PROCEDURE — 700105 HCHG RX REV CODE 258: Performed by: STUDENT IN AN ORGANIZED HEALTH CARE EDUCATION/TRAINING PROGRAM

## 2022-01-01 PROCEDURE — G0299 HHS/HOSPICE OF RN EA 15 MIN: HCPCS

## 2022-01-01 PROCEDURE — 700101 HCHG RX REV CODE 250: Performed by: INTERNAL MEDICINE

## 2022-01-01 PROCEDURE — 99232 SBSQ HOSP IP/OBS MODERATE 35: CPT | Performed by: STUDENT IN AN ORGANIZED HEALTH CARE EDUCATION/TRAINING PROGRAM

## 2022-01-01 PROCEDURE — A9270 NON-COVERED ITEM OR SERVICE: HCPCS | Performed by: NURSE PRACTITIONER

## 2022-01-01 PROCEDURE — 80307 DRUG TEST PRSMV CHEM ANLYZR: CPT

## 2022-01-01 PROCEDURE — 700111 HCHG RX REV CODE 636 W/ 250 OVERRIDE (IP)

## 2022-01-01 PROCEDURE — 99285 EMERGENCY DEPT VISIT HI MDM: CPT

## 2022-01-01 PROCEDURE — 97602 WOUND(S) CARE NON-SELECTIVE: CPT

## 2022-01-01 PROCEDURE — 72125 CT NECK SPINE W/O DYE: CPT

## 2022-01-01 PROCEDURE — 99232 SBSQ HOSP IP/OBS MODERATE 35: CPT | Performed by: INTERNAL MEDICINE

## 2022-01-01 PROCEDURE — G0156 HHCP-SVS OF AIDE,EA 15 MIN: HCPCS

## 2022-01-01 PROCEDURE — 93005 ELECTROCARDIOGRAM TRACING: CPT

## 2022-01-01 PROCEDURE — 0240U HCHG SARS-COV-2 COVID-19 NFCT DS RESP RNA 3 TRGT MIC: CPT

## 2022-01-01 PROCEDURE — 87641 MR-STAPH DNA AMP PROBE: CPT

## 2022-01-01 PROCEDURE — 96374 THER/PROPH/DIAG INJ IV PUSH: CPT

## 2022-01-01 PROCEDURE — 82077 ASSAY SPEC XCP UR&BREATH IA: CPT

## 2022-01-01 PROCEDURE — 82306 VITAMIN D 25 HYDROXY: CPT

## 2022-01-01 PROCEDURE — 82607 VITAMIN B-12: CPT

## 2022-01-01 PROCEDURE — 93922 UPR/L XTREMITY ART 2 LEVELS: CPT | Mod: 26 | Performed by: INTERNAL MEDICINE

## 2022-01-01 PROCEDURE — 82140 ASSAY OF AMMONIA: CPT

## 2022-01-01 PROCEDURE — 87086 URINE CULTURE/COLONY COUNT: CPT

## 2022-01-01 PROCEDURE — 71045 X-RAY EXAM CHEST 1 VIEW: CPT

## 2022-01-01 PROCEDURE — 8E0ZXY6 ISOLATION: ICD-10-PCS | Performed by: NURSE PRACTITIONER

## 2022-01-01 PROCEDURE — 84443 ASSAY THYROID STIM HORMONE: CPT

## 2022-01-01 PROCEDURE — 96368 THER/DIAG CONCURRENT INF: CPT

## 2022-01-01 PROCEDURE — 96372 THER/PROPH/DIAG INJ SC/IM: CPT

## 2022-01-01 PROCEDURE — 80061 LIPID PANEL: CPT

## 2022-01-01 PROCEDURE — G0378 HOSPITAL OBSERVATION PER HR: HCPCS

## 2022-01-01 PROCEDURE — 0054A HCHG COVID-19 30MCG/0.3ML TRIS-SUC BOOSTER: CPT

## 2022-01-01 PROCEDURE — 96365 THER/PROPH/DIAG IV INF INIT: CPT

## 2022-01-01 PROCEDURE — 51702 INSERT TEMP BLADDER CATH: CPT

## 2022-01-01 PROCEDURE — 93880 EXTRACRANIAL BILAT STUDY: CPT

## 2022-01-01 PROCEDURE — 87077 CULTURE AEROBIC IDENTIFY: CPT | Mod: 91

## 2022-01-01 PROCEDURE — A6214 FOAM DRG > 48 SQ IN W/BORDER: HCPCS

## 2022-01-01 PROCEDURE — 99284 EMERGENCY DEPT VISIT MOD MDM: CPT

## 2022-01-01 PROCEDURE — 87040 BLOOD CULTURE FOR BACTERIA: CPT

## 2022-01-01 PROCEDURE — 99233 SBSQ HOSP IP/OBS HIGH 50: CPT | Performed by: INTERNAL MEDICINE

## 2022-01-01 PROCEDURE — 87186 SC STD MICRODIL/AGAR DIL: CPT | Mod: 91

## 2022-01-01 PROCEDURE — G0155 HHCP-SVS OF CSW,EA 15 MIN: HCPCS

## 2022-01-01 PROCEDURE — 84100 ASSAY OF PHOSPHORUS: CPT

## 2022-01-01 PROCEDURE — 97163 PT EVAL HIGH COMPLEX 45 MIN: CPT

## 2022-01-01 PROCEDURE — 0241U HCHG SARS-COV-2 COVID-19 NFCT DS RESP RNA 4 TRGT MIC: CPT

## 2022-01-01 PROCEDURE — 99498 ADVNCD CARE PLAN ADDL 30 MIN: CPT | Performed by: NURSE PRACTITIONER

## 2022-01-01 PROCEDURE — 81001 URINALYSIS AUTO W/SCOPE: CPT

## 2022-01-01 PROCEDURE — 93880 EXTRACRANIAL BILAT STUDY: CPT | Mod: 26 | Performed by: INTERNAL MEDICINE

## 2022-01-01 PROCEDURE — 86480 TB TEST CELL IMMUN MEASURE: CPT

## 2022-01-01 PROCEDURE — 303105 HCHG CATHETER EXTRA

## 2022-01-01 PROCEDURE — 99232 SBSQ HOSP IP/OBS MODERATE 35: CPT | Mod: 25 | Performed by: STUDENT IN AN ORGANIZED HEALTH CARE EDUCATION/TRAINING PROGRAM

## 2022-01-01 PROCEDURE — 83036 HEMOGLOBIN GLYCOSYLATED A1C: CPT

## 2022-01-01 PROCEDURE — A6212 FOAM DRG <=16 SQ IN W/BORDER: HCPCS

## 2022-01-01 PROCEDURE — 99220 PR INITIAL OBSERVATION CARE,LEVL III: CPT | Performed by: INTERNAL MEDICINE

## 2022-01-01 PROCEDURE — C9803 HOPD COVID-19 SPEC COLLECT: HCPCS | Performed by: NURSE PRACTITIONER

## 2022-01-01 PROCEDURE — 93005 ELECTROCARDIOGRAM TRACING: CPT | Performed by: EMERGENCY MEDICINE

## 2022-01-01 PROCEDURE — 99226 PR SUBSEQUENT OBSERVATION CARE,LEVEL III: CPT | Performed by: INTERNAL MEDICINE

## 2022-01-01 PROCEDURE — 87186 SC STD MICRODIL/AGAR DIL: CPT

## 2022-01-01 PROCEDURE — 70450 CT HEAD/BRAIN W/O DYE: CPT

## 2022-01-01 PROCEDURE — 83735 ASSAY OF MAGNESIUM: CPT

## 2022-01-01 PROCEDURE — 92610 EVALUATE SWALLOWING FUNCTION: CPT

## 2022-01-01 PROCEDURE — 700111 HCHG RX REV CODE 636 W/ 250 OVERRIDE (IP): Performed by: NURSE PRACTITIONER

## 2022-01-01 PROCEDURE — 99497 ADVNCD CARE PLAN 30 MIN: CPT | Performed by: STUDENT IN AN ORGANIZED HEALTH CARE EDUCATION/TRAINING PROGRAM

## 2022-01-01 PROCEDURE — 665036 HSPC NOTICE OF ELECTION NOE

## 2022-01-01 PROCEDURE — 99226 PR SUBSEQUENT OBSERVATION CARE,LEVEL III: CPT | Performed by: STUDENT IN AN ORGANIZED HEALTH CARE EDUCATION/TRAINING PROGRAM

## 2022-01-01 PROCEDURE — 99220 PR INITIAL OBSERVATION CARE,LEVL III: CPT | Performed by: STUDENT IN AN ORGANIZED HEALTH CARE EDUCATION/TRAINING PROGRAM

## 2022-01-01 PROCEDURE — C9803 HOPD COVID-19 SPEC COLLECT: HCPCS | Performed by: INTERNAL MEDICINE

## 2022-01-01 PROCEDURE — 70450 CT HEAD/BRAIN W/O DYE: CPT | Mod: MG

## 2022-01-01 RX ORDER — DEXTROSE MONOHYDRATE, SODIUM CHLORIDE, AND POTASSIUM CHLORIDE 50; 2.98; 9 G/1000ML; G/1000ML; G/1000ML
INJECTION, SOLUTION INTRAVENOUS CONTINUOUS
Status: DISCONTINUED | OUTPATIENT
Start: 2022-01-01 | End: 2022-01-01

## 2022-01-01 RX ORDER — ACETAMINOPHEN 650 MG/1
650 SUPPOSITORY RECTAL EVERY 6 HOURS
Qty: 5 SUPPOSITORY | Refills: 2 | Status: SHIPPED | OUTPATIENT
Start: 2022-01-01

## 2022-01-01 RX ORDER — ATORVASTATIN CALCIUM 10 MG/1
10 TABLET, FILM COATED ORAL DAILY
Status: ON HOLD | COMMUNITY
End: 2022-01-01

## 2022-01-01 RX ORDER — DEXTROSE AND SODIUM CHLORIDE 5; .9 G/100ML; G/100ML
INJECTION, SOLUTION INTRAVENOUS CONTINUOUS
Status: DISCONTINUED | OUTPATIENT
Start: 2022-01-01 | End: 2022-01-01

## 2022-01-01 RX ORDER — ASPIRIN 300 MG/1
300 SUPPOSITORY RECTAL DAILY
Status: DISCONTINUED | OUTPATIENT
Start: 2022-01-01 | End: 2022-01-01

## 2022-01-01 RX ORDER — TAMSULOSIN HYDROCHLORIDE 0.4 MG/1
0.8 CAPSULE ORAL
Qty: 60 CAPSULE | Refills: 2 | OUTPATIENT
Start: 2022-01-01

## 2022-01-01 RX ORDER — ACETAMINOPHEN 325 MG/1
650 TABLET ORAL EVERY 6 HOURS
Qty: 30 TABLET | Refills: 2 | Status: SHIPPED | OUTPATIENT
Start: 2022-01-01

## 2022-01-01 RX ORDER — QUETIAPINE FUMARATE 25 MG/1
25 TABLET, FILM COATED ORAL
Status: DISCONTINUED | OUTPATIENT
Start: 2022-01-01 | End: 2022-01-01 | Stop reason: HOSPADM

## 2022-01-01 RX ORDER — BISACODYL 10 MG
10 SUPPOSITORY, RECTAL RECTAL
Qty: 5 SUPPOSITORY | Refills: 2 | OUTPATIENT
Start: 2022-01-01

## 2022-01-01 RX ORDER — AMOXICILLIN 500 MG/1
500 CAPSULE ORAL EVERY 12 HOURS
Status: DISCONTINUED | OUTPATIENT
Start: 2022-01-01 | End: 2022-01-01 | Stop reason: HOSPADM

## 2022-01-01 RX ORDER — ENOXAPARIN SODIUM 100 MG/ML
40 INJECTION SUBCUTANEOUS DAILY
Status: DISCONTINUED | OUTPATIENT
Start: 2022-01-01 | End: 2022-01-01

## 2022-01-01 RX ORDER — POLYETHYLENE GLYCOL 3350 17 G/17G
17 POWDER, FOR SOLUTION ORAL DAILY
Qty: 238 G | Refills: 2 | OUTPATIENT
Start: 2022-01-01

## 2022-01-01 RX ORDER — MORPHINE SULFATE 4 MG/ML
2 INJECTION INTRAVENOUS
Status: DISCONTINUED | OUTPATIENT
Start: 2022-01-01 | End: 2022-01-01 | Stop reason: HOSPADM

## 2022-01-01 RX ORDER — FINASTERIDE 5 MG/1
5 TABLET, FILM COATED ORAL DAILY
Status: DISCONTINUED | OUTPATIENT
Start: 2022-01-01 | End: 2022-01-01 | Stop reason: HOSPADM

## 2022-01-01 RX ORDER — ONDANSETRON 4 MG/1
4 TABLET, ORALLY DISINTEGRATING ORAL EVERY 4 HOURS PRN
Status: DISCONTINUED | OUTPATIENT
Start: 2022-01-01 | End: 2022-01-01 | Stop reason: HOSPADM

## 2022-01-01 RX ORDER — DIAPER,BRIEF,INFANT-TODD,DISP
1 EACH MISCELLANEOUS 2 TIMES DAILY
Status: ON HOLD | COMMUNITY
End: 2022-01-01

## 2022-01-01 RX ORDER — POLYETHYLENE GLYCOL 3350 17 G/17G
1 POWDER, FOR SOLUTION ORAL
Status: DISCONTINUED | OUTPATIENT
Start: 2022-01-01 | End: 2022-01-01 | Stop reason: HOSPADM

## 2022-01-01 RX ORDER — VITAMIN B COMPLEX
1000 TABLET ORAL DAILY
Status: DISCONTINUED | OUTPATIENT
Start: 2022-01-01 | End: 2022-01-01

## 2022-01-01 RX ORDER — AMOXICILLIN 250 MG
2 CAPSULE ORAL 2 TIMES DAILY
Status: DISCONTINUED | OUTPATIENT
Start: 2022-01-01 | End: 2022-01-01 | Stop reason: HOSPADM

## 2022-01-01 RX ORDER — BISACODYL 10 MG
10 SUPPOSITORY, RECTAL RECTAL
Status: DISCONTINUED | OUTPATIENT
Start: 2022-01-01 | End: 2022-01-01 | Stop reason: HOSPADM

## 2022-01-01 RX ORDER — FINASTERIDE 5 MG/1
5 TABLET, FILM COATED ORAL DAILY
Qty: 30 TABLET | Refills: 0 | Status: SHIPPED | OUTPATIENT
Start: 2022-01-01 | End: 2022-01-01

## 2022-01-01 RX ORDER — ATORVASTATIN CALCIUM 10 MG/1
10 TABLET, FILM COATED ORAL NIGHTLY
Status: DISCONTINUED | OUTPATIENT
Start: 2022-01-01 | End: 2022-01-01 | Stop reason: HOSPADM

## 2022-01-01 RX ORDER — ACETAMINOPHEN 325 MG/1
650 TABLET ORAL EVERY 4 HOURS PRN
Status: ON HOLD | COMMUNITY
End: 2022-01-01 | Stop reason: HOSPADM

## 2022-01-01 RX ORDER — ERGOCALCIFEROL 1.25 MG/1
50000 CAPSULE ORAL
Status: DISCONTINUED | OUTPATIENT
Start: 2022-01-01 | End: 2022-01-01 | Stop reason: HOSPADM

## 2022-01-01 RX ORDER — ASPIRIN 81 MG/1
81 TABLET, CHEWABLE ORAL DAILY
Status: ON HOLD | COMMUNITY
End: 2022-01-01

## 2022-01-01 RX ORDER — BACITRACIN ZINC 500 [USP'U]/G
1 OINTMENT TOPICAL 2 TIMES DAILY
Status: ON HOLD | COMMUNITY
End: 2022-01-01

## 2022-01-01 RX ORDER — LEVOFLOXACIN 750 MG/1
750 TABLET, FILM COATED ORAL DAILY
Qty: 3 TABLET | Refills: 0 | Status: SHIPPED
Start: 2022-01-01 | End: 2022-01-01

## 2022-01-01 RX ORDER — CHOLECALCIFEROL (VITAMIN D3) 125 MCG
5 CAPSULE ORAL NIGHTLY
Qty: 30 TABLET | Status: SHIPPED
Start: 2022-01-01 | End: 2022-01-01

## 2022-01-01 RX ORDER — GINSENG 100 MG
1 CAPSULE ORAL DAILY
Status: ON HOLD | COMMUNITY
End: 2022-01-01

## 2022-01-01 RX ORDER — ACETAMINOPHEN 650 MG/1
650 SUPPOSITORY RECTAL EVERY 6 HOURS
Qty: 5 SUPPOSITORY | Refills: 2 | Status: SHIPPED | OUTPATIENT
Start: 2022-01-01 | End: 2022-01-01 | Stop reason: SDUPTHER

## 2022-01-01 RX ORDER — LORAZEPAM 2 MG/ML
0.5 CONCENTRATE ORAL EVERY 4 HOURS
Qty: 240 ML | Refills: 0 | Status: SHIPPED | OUTPATIENT
Start: 2022-01-01

## 2022-01-01 RX ORDER — TAMSULOSIN HYDROCHLORIDE 0.4 MG/1
0.8 CAPSULE ORAL
Status: DISCONTINUED | OUTPATIENT
Start: 2022-01-01 | End: 2022-01-01 | Stop reason: HOSPADM

## 2022-01-01 RX ORDER — ACETAMINOPHEN 325 MG/1
650 TABLET ORAL EVERY 4 HOURS
Qty: 30 TABLET | Refills: 2 | Status: SHIPPED | OUTPATIENT
Start: 2022-01-01 | End: 2022-01-01 | Stop reason: SDUPTHER

## 2022-01-01 RX ORDER — OXYCODONE HYDROCHLORIDE 5 MG/1
5 TABLET ORAL
Status: DISCONTINUED | OUTPATIENT
Start: 2022-01-01 | End: 2022-01-01 | Stop reason: HOSPADM

## 2022-01-01 RX ORDER — DOCUSATE SODIUM 100 MG/1
100 CAPSULE, LIQUID FILLED ORAL 2 TIMES DAILY
Status: ON HOLD | COMMUNITY
End: 2022-01-01

## 2022-01-01 RX ORDER — OMEPRAZOLE 20 MG/1
20 CAPSULE, DELAYED RELEASE ORAL DAILY
Status: ON HOLD | COMMUNITY
End: 2022-01-01

## 2022-01-01 RX ORDER — CYANOCOBALAMIN (VITAMIN B-12) 1000 MCG
1000 TABLET, EXTENDED RELEASE ORAL DAILY
Status: ON HOLD | COMMUNITY
End: 2022-01-01

## 2022-01-01 RX ORDER — ENOXAPARIN SODIUM 100 MG/ML
40 INJECTION SUBCUTANEOUS DAILY
Status: DISCONTINUED | OUTPATIENT
Start: 2022-01-01 | End: 2022-01-01 | Stop reason: HOSPADM

## 2022-01-01 RX ORDER — ONDANSETRON 4 MG/1
4 TABLET, ORALLY DISINTEGRATING ORAL EVERY 4 HOURS
Qty: 5 TABLET | Refills: 2 | OUTPATIENT
Start: 2022-01-01

## 2022-01-01 RX ORDER — ACETAMINOPHEN 325 MG/1
650 TABLET ORAL EVERY 6 HOURS PRN
Qty: 30 TABLET | Refills: 0 | Status: SHIPPED
Start: 2022-01-01 | End: 2022-01-01

## 2022-01-01 RX ORDER — TAMSULOSIN HYDROCHLORIDE 0.4 MG/1
0.4 CAPSULE ORAL
Status: DISCONTINUED | OUTPATIENT
Start: 2022-01-01 | End: 2022-01-01 | Stop reason: HOSPADM

## 2022-01-01 RX ORDER — CHOLECALCIFEROL (VITAMIN D3) 125 MCG
1000 CAPSULE ORAL DAILY
Status: DISCONTINUED | OUTPATIENT
Start: 2022-01-01 | End: 2022-01-01 | Stop reason: HOSPADM

## 2022-01-01 RX ORDER — ACETAMINOPHEN 325 MG/1
650 TABLET ORAL EVERY 6 HOURS PRN
Status: DISCONTINUED | OUTPATIENT
Start: 2022-01-01 | End: 2022-01-01 | Stop reason: HOSPADM

## 2022-01-01 RX ORDER — SODIUM CHLORIDE 9 MG/ML
INJECTION, SOLUTION INTRAVENOUS CONTINUOUS
Status: DISCONTINUED | OUTPATIENT
Start: 2022-01-01 | End: 2022-01-01

## 2022-01-01 RX ORDER — OMEPRAZOLE 20 MG/1
20 CAPSULE, DELAYED RELEASE ORAL DAILY
Qty: 30 CAPSULE | Refills: 2 | OUTPATIENT
Start: 2022-01-01

## 2022-01-01 RX ORDER — NITROFURANTOIN 25; 75 MG/1; MG/1
100 CAPSULE ORAL ONCE
Status: DISCONTINUED | OUTPATIENT
Start: 2022-01-01 | End: 2022-01-01

## 2022-01-01 RX ORDER — CHOLECALCIFEROL (VITAMIN D3) 125 MCG
5 CAPSULE ORAL NIGHTLY
Status: DISCONTINUED | OUTPATIENT
Start: 2022-01-01 | End: 2022-01-01 | Stop reason: HOSPADM

## 2022-01-01 RX ORDER — TAMSULOSIN HYDROCHLORIDE 0.4 MG/1
0.8 CAPSULE ORAL
Qty: 30 CAPSULE | Refills: 0 | Status: SHIPPED | OUTPATIENT
Start: 2022-01-01 | End: 2022-01-01

## 2022-01-01 RX ORDER — PETROLATUM 42 G/100G
OINTMENT TOPICAL 3 TIMES DAILY PRN
Status: DISCONTINUED | OUTPATIENT
Start: 2022-01-01 | End: 2022-01-01 | Stop reason: HOSPADM

## 2022-01-01 RX ORDER — OXYCODONE HYDROCHLORIDE 5 MG/1
2.5 TABLET ORAL
Status: DISCONTINUED | OUTPATIENT
Start: 2022-01-01 | End: 2022-01-01 | Stop reason: HOSPADM

## 2022-01-01 RX ORDER — QUETIAPINE FUMARATE 25 MG/1
25 TABLET, FILM COATED ORAL NIGHTLY PRN
Status: DISCONTINUED | OUTPATIENT
Start: 2022-01-01 | End: 2022-01-01 | Stop reason: HOSPADM

## 2022-01-01 RX ORDER — QUETIAPINE FUMARATE 25 MG/1
25 TABLET, FILM COATED ORAL
Qty: 30 TABLET | Refills: 2 | OUTPATIENT
Start: 2022-01-01

## 2022-01-01 RX ORDER — SODIUM CHLORIDE AND POTASSIUM CHLORIDE 300; 900 MG/100ML; MG/100ML
INJECTION, SOLUTION INTRAVENOUS CONTINUOUS
Status: DISCONTINUED | OUTPATIENT
Start: 2022-01-01 | End: 2022-01-01

## 2022-01-01 RX ORDER — TAMSULOSIN HYDROCHLORIDE 0.4 MG/1
0.4 CAPSULE ORAL
Status: DISCONTINUED | OUTPATIENT
Start: 2022-01-01 | End: 2022-01-01

## 2022-01-01 RX ORDER — ASPIRIN 325 MG
325 TABLET ORAL DAILY
Status: DISCONTINUED | OUTPATIENT
Start: 2022-01-01 | End: 2022-01-01

## 2022-01-01 RX ORDER — ONDANSETRON 2 MG/ML
4 INJECTION INTRAMUSCULAR; INTRAVENOUS EVERY 4 HOURS PRN
Status: DISCONTINUED | OUTPATIENT
Start: 2022-01-01 | End: 2022-01-01 | Stop reason: HOSPADM

## 2022-01-01 RX ORDER — ASPIRIN 81 MG/1
324 TABLET, CHEWABLE ORAL DAILY
Status: DISCONTINUED | OUTPATIENT
Start: 2022-01-01 | End: 2022-01-01

## 2022-01-01 RX ORDER — ATORVASTATIN CALCIUM 10 MG/1
10 TABLET, FILM COATED ORAL EVERY EVENING
Status: DISCONTINUED | OUTPATIENT
Start: 2022-01-01 | End: 2022-01-01 | Stop reason: HOSPADM

## 2022-01-01 RX ORDER — DEXTROSE MONOHYDRATE, SODIUM CHLORIDE, AND POTASSIUM CHLORIDE 50; 1.49; 9 G/1000ML; G/1000ML; G/1000ML
INJECTION, SOLUTION INTRAVENOUS CONTINUOUS
Status: DISCONTINUED | OUTPATIENT
Start: 2022-01-01 | End: 2022-01-01

## 2022-01-01 RX ORDER — OMEPRAZOLE 20 MG/1
20 CAPSULE, DELAYED RELEASE ORAL DAILY
Status: DISCONTINUED | OUTPATIENT
Start: 2022-01-01 | End: 2022-01-01 | Stop reason: HOSPADM

## 2022-01-01 RX ORDER — ASPIRIN 81 MG/1
81 TABLET, CHEWABLE ORAL DAILY
Status: DISCONTINUED | OUTPATIENT
Start: 2022-01-01 | End: 2022-01-01 | Stop reason: HOSPADM

## 2022-01-01 RX ORDER — LEVOFLOXACIN 750 MG/1
750 TABLET, FILM COATED ORAL DAILY
Status: COMPLETED | OUTPATIENT
Start: 2022-01-01 | End: 2022-01-01

## 2022-01-01 RX ORDER — QUETIAPINE FUMARATE 25 MG/1
25 TABLET, FILM COATED ORAL NIGHTLY PRN
Status: ON HOLD | COMMUNITY
End: 2022-01-01

## 2022-01-01 RX ORDER — MORPHINE SULFATE 100 MG/5ML
5 SOLUTION ORAL
Qty: 120 ML | Refills: 0 | Status: SHIPPED | OUTPATIENT
Start: 2022-01-01 | End: 2023-08-29

## 2022-01-01 RX ORDER — LANOLIN, PETROLATUM 15.5; 53.4 G/100G; G/100G
1 OINTMENT TOPICAL DAILY
Status: ON HOLD | COMMUNITY
End: 2022-01-01

## 2022-01-01 RX ORDER — HALOPERIDOL 5 MG/ML
2 INJECTION INTRAMUSCULAR EVERY 4 HOURS PRN
Status: DISCONTINUED | OUTPATIENT
Start: 2022-01-01 | End: 2022-01-01 | Stop reason: HOSPADM

## 2022-01-01 RX ORDER — LABETALOL HYDROCHLORIDE 5 MG/ML
10 INJECTION, SOLUTION INTRAVENOUS EVERY 4 HOURS PRN
Status: DISCONTINUED | OUTPATIENT
Start: 2022-01-01 | End: 2022-01-01 | Stop reason: HOSPADM

## 2022-01-01 RX ORDER — DONEPEZIL HYDROCHLORIDE 5 MG/1
5 TABLET, FILM COATED ORAL DAILY
Status: DISCONTINUED | OUTPATIENT
Start: 2022-01-01 | End: 2022-01-01 | Stop reason: HOSPADM

## 2022-01-01 RX ORDER — TAMSULOSIN HYDROCHLORIDE 0.4 MG/1
0.4 CAPSULE ORAL
Qty: 30 CAPSULE | Status: ON HOLD
Start: 2022-01-01 | End: 2022-01-01

## 2022-01-01 RX ORDER — HALOPERIDOL 5 MG/ML
2 INJECTION INTRAMUSCULAR EVERY 4 HOURS PRN
Status: DISCONTINUED | OUTPATIENT
Start: 2022-01-01 | End: 2022-01-01

## 2022-01-01 RX ORDER — ERGOCALCIFEROL 1.25 MG/1
50000 CAPSULE ORAL
Qty: 5 CAPSULE | Status: SHIPPED
Start: 2022-01-01 | End: 2022-01-01

## 2022-01-01 RX ORDER — ATORVASTATIN CALCIUM 40 MG/1
40 TABLET, FILM COATED ORAL EVERY EVENING
Status: DISCONTINUED | OUTPATIENT
Start: 2022-01-01 | End: 2022-01-01

## 2022-01-01 RX ORDER — AMOXICILLIN 500 MG/1
500 CAPSULE ORAL EVERY 12 HOURS
Qty: 4 CAPSULE | Refills: 0 | Status: SHIPPED | OUTPATIENT
Start: 2022-01-01 | End: 2022-01-01

## 2022-01-01 RX ORDER — PETROLATUM 42 G/100G
OINTMENT TOPICAL
Status: SHIPPED
Start: 2022-01-01 | End: 2022-01-01

## 2022-01-01 RX ORDER — POLYETHYLENE GLYCOL 3350 17 G/17G
17 POWDER, FOR SOLUTION ORAL DAILY
Status: ON HOLD | COMMUNITY
End: 2022-01-01

## 2022-01-01 RX ADMIN — ENOXAPARIN SODIUM 40 MG: 40 INJECTION SUBCUTANEOUS at 16:58

## 2022-01-01 RX ADMIN — OMEPRAZOLE 20 MG: 20 CAPSULE, DELAYED RELEASE ORAL at 04:36

## 2022-01-01 RX ADMIN — SENNOSIDES AND DOCUSATE SODIUM 2 TABLET: 50; 8.6 TABLET ORAL at 06:09

## 2022-01-01 RX ADMIN — CYANOCOBALAMIN TAB 500 MCG 1000 MCG: 500 TAB at 04:48

## 2022-01-01 RX ADMIN — OMEPRAZOLE 20 MG: 20 CAPSULE, DELAYED RELEASE ORAL at 05:38

## 2022-01-01 RX ADMIN — ATORVASTATIN CALCIUM 10 MG: 10 TABLET, FILM COATED ORAL at 16:58

## 2022-01-01 RX ADMIN — DONEPEZIL HYDROCHLORIDE 5 MG: 5 TABLET, FILM COATED ORAL at 04:25

## 2022-01-01 RX ADMIN — LEVOFLOXACIN 750 MG: 750 TABLET, FILM COATED ORAL at 04:02

## 2022-01-01 RX ADMIN — QUETIAPINE FUMARATE 25 MG: 25 TABLET ORAL at 21:05

## 2022-01-01 RX ADMIN — FINASTERIDE 5 MG: 5 TABLET, FILM COATED ORAL at 04:48

## 2022-01-01 RX ADMIN — TAMSULOSIN HYDROCHLORIDE 0.8 MG: 0.4 CAPSULE ORAL at 10:45

## 2022-01-01 RX ADMIN — PIPERACILLIN AND TAZOBACTAM 4.5 G: 4; .5 INJECTION, POWDER, FOR SOLUTION INTRAVENOUS at 12:37

## 2022-01-01 RX ADMIN — PIPERACILLIN AND TAZOBACTAM 3.38 G: 3; .375 INJECTION, POWDER, LYOPHILIZED, FOR SOLUTION INTRAVENOUS; PARENTERAL at 06:17

## 2022-01-01 RX ADMIN — FINASTERIDE 5 MG: 5 TABLET, FILM COATED ORAL at 04:33

## 2022-01-01 RX ADMIN — OMEPRAZOLE 20 MG: 20 CAPSULE, DELAYED RELEASE ORAL at 04:39

## 2022-01-01 RX ADMIN — QUETIAPINE FUMARATE 25 MG: 25 TABLET ORAL at 23:22

## 2022-01-01 RX ADMIN — DOCUSATE SODIUM 50 MG AND SENNOSIDES 8.6 MG 2 TABLET: 8.6; 5 TABLET, FILM COATED ORAL at 17:49

## 2022-01-01 RX ADMIN — TAMSULOSIN HYDROCHLORIDE 0.4 MG: 0.4 CAPSULE ORAL at 09:14

## 2022-01-01 RX ADMIN — ATORVASTATIN CALCIUM 10 MG: 10 TABLET, FILM COATED ORAL at 20:58

## 2022-01-01 RX ADMIN — OXYCODONE 5 MG: 5 TABLET ORAL at 20:23

## 2022-01-01 RX ADMIN — DOCUSATE SODIUM 50 MG AND SENNOSIDES 8.6 MG 2 TABLET: 8.6; 5 TABLET, FILM COATED ORAL at 16:59

## 2022-01-01 RX ADMIN — SENNOSIDES AND DOCUSATE SODIUM 2 TABLET: 50; 8.6 TABLET ORAL at 06:17

## 2022-01-01 RX ADMIN — DOCUSATE SODIUM 50 MG AND SENNOSIDES 8.6 MG 2 TABLET: 8.6; 5 TABLET, FILM COATED ORAL at 18:29

## 2022-01-01 RX ADMIN — ASPIRIN 81 MG 81 MG: 81 TABLET ORAL at 04:48

## 2022-01-01 RX ADMIN — ENOXAPARIN SODIUM 40 MG: 40 INJECTION SUBCUTANEOUS at 18:13

## 2022-01-01 RX ADMIN — POTASSIUM CHLORIDE AND SODIUM CHLORIDE: 900; 300 INJECTION, SOLUTION INTRAVENOUS at 02:51

## 2022-01-01 RX ADMIN — DOCUSATE SODIUM 50 MG AND SENNOSIDES 8.6 MG 2 TABLET: 8.6; 5 TABLET, FILM COATED ORAL at 18:24

## 2022-01-01 RX ADMIN — TAMSULOSIN HYDROCHLORIDE 0.8 MG: 0.4 CAPSULE ORAL at 09:14

## 2022-01-01 RX ADMIN — CYANOCOBALAMIN TAB 500 MCG 1000 MCG: 500 TAB at 05:04

## 2022-01-01 RX ADMIN — OMEPRAZOLE 20 MG: 20 CAPSULE, DELAYED RELEASE ORAL at 04:11

## 2022-01-01 RX ADMIN — OXYCODONE 5 MG: 5 TABLET ORAL at 01:44

## 2022-01-01 RX ADMIN — AMOXICILLIN 500 MG: 500 CAPSULE ORAL at 06:17

## 2022-01-01 RX ADMIN — LEVOFLOXACIN 750 MG: 750 TABLET, FILM COATED ORAL at 05:38

## 2022-01-01 RX ADMIN — ASPIRIN 81 MG 81 MG: 81 TABLET ORAL at 06:16

## 2022-01-01 RX ADMIN — ATORVASTATIN CALCIUM 10 MG: 10 TABLET, FILM COATED ORAL at 18:00

## 2022-01-01 RX ADMIN — CYANOCOBALAMIN TAB 500 MCG 1000 MCG: 500 TAB at 06:16

## 2022-01-01 RX ADMIN — QUETIAPINE FUMARATE 25 MG: 25 TABLET ORAL at 22:45

## 2022-01-01 RX ADMIN — FINASTERIDE 5 MG: 5 TABLET, FILM COATED ORAL at 04:25

## 2022-01-01 RX ADMIN — DOCUSATE SODIUM 50 MG AND SENNOSIDES 8.6 MG 2 TABLET: 8.6; 5 TABLET, FILM COATED ORAL at 06:16

## 2022-01-01 RX ADMIN — ASPIRIN 81 MG 81 MG: 81 TABLET ORAL at 05:38

## 2022-01-01 RX ADMIN — TAMSULOSIN HYDROCHLORIDE 0.8 MG: 0.4 CAPSULE ORAL at 08:30

## 2022-01-01 RX ADMIN — OXYCODONE 5 MG: 5 TABLET ORAL at 05:59

## 2022-01-01 RX ADMIN — OMEPRAZOLE 20 MG: 20 CAPSULE, DELAYED RELEASE ORAL at 04:02

## 2022-01-01 RX ADMIN — DOCUSATE SODIUM 50 MG AND SENNOSIDES 8.6 MG 2 TABLET: 8.6; 5 TABLET, FILM COATED ORAL at 04:16

## 2022-01-01 RX ADMIN — LEVOFLOXACIN 750 MG: 750 TABLET, FILM COATED ORAL at 09:25

## 2022-01-01 RX ADMIN — ASPIRIN 81 MG 81 MG: 81 TABLET ORAL at 04:25

## 2022-01-01 RX ADMIN — OMEPRAZOLE 20 MG: 20 CAPSULE, DELAYED RELEASE ORAL at 05:04

## 2022-01-01 RX ADMIN — TAMSULOSIN HYDROCHLORIDE 0.4 MG: 0.4 CAPSULE ORAL at 12:58

## 2022-01-01 RX ADMIN — PIPERACILLIN AND TAZOBACTAM 3.38 G: 3; .375 INJECTION, POWDER, LYOPHILIZED, FOR SOLUTION INTRAVENOUS; PARENTERAL at 02:51

## 2022-01-01 RX ADMIN — QUETIAPINE FUMARATE 25 MG: 25 TABLET ORAL at 20:09

## 2022-01-01 RX ADMIN — POTASSIUM CHLORIDE AND SODIUM CHLORIDE: 900; 300 INJECTION, SOLUTION INTRAVENOUS at 19:13

## 2022-01-01 RX ADMIN — ENOXAPARIN SODIUM 40 MG: 40 INJECTION SUBCUTANEOUS at 18:24

## 2022-01-01 RX ADMIN — DONEPEZIL HYDROCHLORIDE 5 MG: 5 TABLET, FILM COATED ORAL at 04:48

## 2022-01-01 RX ADMIN — CYANOCOBALAMIN TAB 500 MCG 1000 MCG: 500 TAB at 04:33

## 2022-01-01 RX ADMIN — CYANOCOBALAMIN TAB 500 MCG 1000 MCG: 500 TAB at 06:04

## 2022-01-01 RX ADMIN — CYANOCOBALAMIN TAB 500 MCG 1000 MCG: 500 TAB at 14:13

## 2022-01-01 RX ADMIN — TAMSULOSIN HYDROCHLORIDE 0.4 MG: 0.4 CAPSULE ORAL at 11:02

## 2022-01-01 RX ADMIN — ENOXAPARIN SODIUM 40 MG: 40 INJECTION SUBCUTANEOUS at 16:59

## 2022-01-01 RX ADMIN — DEXTROSE AND SODIUM CHLORIDE: 5; 900 INJECTION, SOLUTION INTRAVENOUS at 02:54

## 2022-01-01 RX ADMIN — DOCUSATE SODIUM 50 MG AND SENNOSIDES 8.6 MG 2 TABLET: 8.6; 5 TABLET, FILM COATED ORAL at 16:52

## 2022-01-01 RX ADMIN — GENTAMICIN SULFATE 150 MG: 40 INJECTION, SOLUTION INTRAMUSCULAR; INTRAVENOUS at 18:14

## 2022-01-01 RX ADMIN — TAMSULOSIN HYDROCHLORIDE 0.8 MG: 0.4 CAPSULE ORAL at 10:27

## 2022-01-01 RX ADMIN — TAMSULOSIN HYDROCHLORIDE 0.4 MG: 0.4 CAPSULE ORAL at 08:44

## 2022-01-01 RX ADMIN — ATORVASTATIN CALCIUM 10 MG: 10 TABLET, FILM COATED ORAL at 18:23

## 2022-01-01 RX ADMIN — OMEPRAZOLE 20 MG: 20 CAPSULE, DELAYED RELEASE ORAL at 04:34

## 2022-01-01 RX ADMIN — OXYCODONE 5 MG: 5 TABLET ORAL at 04:48

## 2022-01-01 RX ADMIN — POLYETHYLENE GLYCOL 3350 1 PACKET: 17 POWDER, FOR SOLUTION ORAL at 12:58

## 2022-01-01 RX ADMIN — SENNOSIDES AND DOCUSATE SODIUM 2 TABLET: 50; 8.6 TABLET ORAL at 16:08

## 2022-01-01 RX ADMIN — CEFTRIAXONE SODIUM 2 G: 10 INJECTION, POWDER, FOR SOLUTION INTRAVENOUS at 05:34

## 2022-01-01 RX ADMIN — HYDROCORTISONE: 25 OINTMENT TOPICAL at 05:04

## 2022-01-01 RX ADMIN — TAMSULOSIN HYDROCHLORIDE 0.8 MG: 0.4 CAPSULE ORAL at 09:18

## 2022-01-01 RX ADMIN — DONEPEZIL HYDROCHLORIDE 5 MG: 5 TABLET, FILM COATED ORAL at 04:39

## 2022-01-01 RX ADMIN — Medication 5 MG: at 20:43

## 2022-01-01 RX ADMIN — ATORVASTATIN CALCIUM 10 MG: 10 TABLET, FILM COATED ORAL at 16:59

## 2022-01-01 RX ADMIN — ATORVASTATIN CALCIUM 10 MG: 10 TABLET, FILM COATED ORAL at 20:09

## 2022-01-01 RX ADMIN — LEVOFLOXACIN 750 MG: 750 TABLET, FILM COATED ORAL at 04:34

## 2022-01-01 RX ADMIN — DONEPEZIL HYDROCHLORIDE 5 MG: 5 TABLET, FILM COATED ORAL at 04:04

## 2022-01-01 RX ADMIN — TAMSULOSIN HYDROCHLORIDE 0.8 MG: 0.4 CAPSULE ORAL at 09:41

## 2022-01-01 RX ADMIN — CYANOCOBALAMIN TAB 500 MCG 1000 MCG: 500 TAB at 16:08

## 2022-01-01 RX ADMIN — CEFTRIAXONE SODIUM 2 G: 10 INJECTION, POWDER, FOR SOLUTION INTRAVENOUS at 04:36

## 2022-01-01 RX ADMIN — DONEPEZIL HYDROCHLORIDE 5 MG: 5 TABLET, FILM COATED ORAL at 05:39

## 2022-01-01 RX ADMIN — OMEPRAZOLE 20 MG: 20 CAPSULE, DELAYED RELEASE ORAL at 06:16

## 2022-01-01 RX ADMIN — TAMSULOSIN HYDROCHLORIDE 0.4 MG: 0.4 CAPSULE ORAL at 14:13

## 2022-01-01 RX ADMIN — Medication 5 MG: at 20:58

## 2022-01-01 RX ADMIN — OXYCODONE 5 MG: 5 TABLET ORAL at 22:23

## 2022-01-01 RX ADMIN — DOCUSATE SODIUM 50 MG AND SENNOSIDES 8.6 MG 2 TABLET: 8.6; 5 TABLET, FILM COATED ORAL at 06:04

## 2022-01-01 RX ADMIN — ATORVASTATIN CALCIUM 10 MG: 10 TABLET, FILM COATED ORAL at 17:49

## 2022-01-01 RX ADMIN — QUETIAPINE FUMARATE 25 MG: 25 TABLET ORAL at 20:23

## 2022-01-01 RX ADMIN — ENOXAPARIN SODIUM 40 MG: 40 INJECTION SUBCUTANEOUS at 16:52

## 2022-01-01 RX ADMIN — PIPERACILLIN AND TAZOBACTAM 3.38 G: 3; .375 INJECTION, POWDER, LYOPHILIZED, FOR SOLUTION INTRAVENOUS; PARENTERAL at 23:22

## 2022-01-01 RX ADMIN — QUETIAPINE FUMARATE 25 MG: 25 TABLET ORAL at 20:53

## 2022-01-01 RX ADMIN — OXYCODONE 2.5 MG: 5 TABLET ORAL at 10:15

## 2022-01-01 RX ADMIN — SENNOSIDES AND DOCUSATE SODIUM 2 TABLET: 50; 8.6 TABLET ORAL at 16:54

## 2022-01-01 RX ADMIN — OXYCODONE 5 MG: 5 TABLET ORAL at 23:21

## 2022-01-01 RX ADMIN — FINASTERIDE 5 MG: 5 TABLET, FILM COATED ORAL at 04:54

## 2022-01-01 RX ADMIN — OMEPRAZOLE 20 MG: 20 CAPSULE, DELAYED RELEASE ORAL at 06:09

## 2022-01-01 RX ADMIN — OMEPRAZOLE 20 MG: 20 CAPSULE, DELAYED RELEASE ORAL at 04:53

## 2022-01-01 RX ADMIN — ENOXAPARIN SODIUM 40 MG: 40 INJECTION SUBCUTANEOUS at 16:30

## 2022-01-01 RX ADMIN — OMEPRAZOLE 20 MG: 20 CAPSULE, DELAYED RELEASE ORAL at 06:17

## 2022-01-01 RX ADMIN — OMEPRAZOLE 20 MG: 20 CAPSULE, DELAYED RELEASE ORAL at 04:48

## 2022-01-01 RX ADMIN — TAMSULOSIN HYDROCHLORIDE 0.8 MG: 0.4 CAPSULE ORAL at 09:38

## 2022-01-01 RX ADMIN — ATORVASTATIN CALCIUM 10 MG: 10 TABLET, FILM COATED ORAL at 16:52

## 2022-01-01 RX ADMIN — CYANOCOBALAMIN TAB 500 MCG 1000 MCG: 500 TAB at 04:11

## 2022-01-01 RX ADMIN — OMEPRAZOLE 20 MG: 20 CAPSULE, DELAYED RELEASE ORAL at 04:25

## 2022-01-01 RX ADMIN — OXYCODONE 5 MG: 5 TABLET ORAL at 02:33

## 2022-01-01 RX ADMIN — ENOXAPARIN SODIUM 40 MG: 40 INJECTION SUBCUTANEOUS at 17:49

## 2022-01-01 RX ADMIN — HYDROCORTISONE: 25 OINTMENT TOPICAL at 18:11

## 2022-01-01 RX ADMIN — HALOPERIDOL LACTATE 2 MG: 5 INJECTION, SOLUTION INTRAMUSCULAR at 05:15

## 2022-01-01 RX ADMIN — Medication 5 MG: at 20:52

## 2022-01-01 RX ADMIN — OMEPRAZOLE 20 MG: 20 CAPSULE, DELAYED RELEASE ORAL at 04:16

## 2022-01-01 RX ADMIN — TAMSULOSIN HYDROCHLORIDE 0.4 MG: 0.4 CAPSULE ORAL at 08:28

## 2022-01-01 RX ADMIN — CYANOCOBALAMIN TAB 500 MCG 1000 MCG: 500 TAB at 04:39

## 2022-01-01 RX ADMIN — TAMSULOSIN HYDROCHLORIDE 0.8 MG: 0.4 CAPSULE ORAL at 09:25

## 2022-01-01 RX ADMIN — DOCUSATE SODIUM 50 MG AND SENNOSIDES 8.6 MG 2 TABLET: 8.6; 5 TABLET, FILM COATED ORAL at 17:48

## 2022-01-01 RX ADMIN — PIPERACILLIN AND TAZOBACTAM 3.38 G: 3; .375 INJECTION, POWDER, LYOPHILIZED, FOR SOLUTION INTRAVENOUS; PARENTERAL at 12:11

## 2022-01-01 RX ADMIN — MORPHINE SULFATE 2 MG: 4 INJECTION INTRAVENOUS at 22:34

## 2022-01-01 RX ADMIN — SENNOSIDES AND DOCUSATE SODIUM 2 TABLET: 50; 8.6 TABLET ORAL at 06:00

## 2022-01-01 RX ADMIN — ASPIRIN 81 MG 81 MG: 81 TABLET ORAL at 04:33

## 2022-01-01 RX ADMIN — CYANOCOBALAMIN TAB 500 MCG 1000 MCG: 500 TAB at 06:17

## 2022-01-01 RX ADMIN — ATORVASTATIN CALCIUM 10 MG: 10 TABLET, FILM COATED ORAL at 16:30

## 2022-01-01 RX ADMIN — CYANOCOBALAMIN TAB 500 MCG 1000 MCG: 500 TAB at 05:33

## 2022-01-01 RX ADMIN — CYANOCOBALAMIN TAB 500 MCG 1000 MCG: 500 TAB at 05:38

## 2022-01-01 RX ADMIN — TAMSULOSIN HYDROCHLORIDE 0.4 MG: 0.4 CAPSULE ORAL at 08:33

## 2022-01-01 RX ADMIN — FINASTERIDE 5 MG: 5 TABLET, FILM COATED ORAL at 04:39

## 2022-01-01 RX ADMIN — HYDROCORTISONE: 25 OINTMENT TOPICAL at 18:12

## 2022-01-01 RX ADMIN — ASPIRIN 81 MG 81 MG: 81 TABLET ORAL at 04:11

## 2022-01-01 RX ADMIN — DOCUSATE SODIUM 50 MG AND SENNOSIDES 8.6 MG 2 TABLET: 8.6; 5 TABLET, FILM COATED ORAL at 18:03

## 2022-01-01 RX ADMIN — RIVAROXABAN 10 MG: 10 TABLET, FILM COATED ORAL at 18:12

## 2022-01-01 RX ADMIN — DEXTROSE AND SODIUM CHLORIDE: 5; 900 INJECTION, SOLUTION INTRAVENOUS at 18:41

## 2022-01-01 RX ADMIN — ENOXAPARIN SODIUM 40 MG: 40 INJECTION SUBCUTANEOUS at 18:00

## 2022-01-01 RX ADMIN — DOCUSATE SODIUM 50 MG AND SENNOSIDES 8.6 MG 2 TABLET: 8.6; 5 TABLET, FILM COATED ORAL at 17:42

## 2022-01-01 RX ADMIN — ASPIRIN 81 MG: 81 TABLET, CHEWABLE ORAL at 05:33

## 2022-01-01 RX ADMIN — ENOXAPARIN SODIUM 40 MG: 40 INJECTION SUBCUTANEOUS at 18:29

## 2022-01-01 RX ADMIN — ASPIRIN 81 MG 81 MG: 81 TABLET ORAL at 04:16

## 2022-01-01 RX ADMIN — POTASSIUM CHLORIDE AND SODIUM CHLORIDE: 900; 300 INJECTION, SOLUTION INTRAVENOUS at 15:21

## 2022-01-01 RX ADMIN — QUETIAPINE FUMARATE 25 MG: 25 TABLET ORAL at 20:43

## 2022-01-01 RX ADMIN — DOCUSATE SODIUM 50 MG AND SENNOSIDES 8.6 MG 2 TABLET: 8.6; 5 TABLET, FILM COATED ORAL at 18:00

## 2022-01-01 RX ADMIN — ATORVASTATIN CALCIUM 10 MG: 10 TABLET, FILM COATED ORAL at 20:44

## 2022-01-01 RX ADMIN — ASPIRIN 81 MG: 81 TABLET, CHEWABLE ORAL at 06:09

## 2022-01-01 RX ADMIN — ATORVASTATIN CALCIUM 10 MG: 10 TABLET, FILM COATED ORAL at 18:29

## 2022-01-01 RX ADMIN — BNT162B2 0.3 ML: 0.23 INJECTION, SUSPENSION INTRAMUSCULAR at 15:00

## 2022-01-01 RX ADMIN — ATORVASTATIN CALCIUM 10 MG: 10 TABLET, FILM COATED ORAL at 21:05

## 2022-01-01 RX ADMIN — AMOXICILLIN 500 MG: 500 CAPSULE ORAL at 18:12

## 2022-01-01 RX ADMIN — ACETAMINOPHEN 650 MG: 325 TABLET, FILM COATED ORAL at 02:09

## 2022-01-01 RX ADMIN — ENOXAPARIN SODIUM 40 MG: 40 INJECTION SUBCUTANEOUS at 17:42

## 2022-01-01 RX ADMIN — ASPIRIN 300 MG: 300 SUPPOSITORY RECTAL at 06:00

## 2022-01-01 RX ADMIN — ATORVASTATIN CALCIUM 10 MG: 10 TABLET, FILM COATED ORAL at 17:42

## 2022-01-01 RX ADMIN — ATORVASTATIN CALCIUM 10 MG: 10 TABLET, FILM COATED ORAL at 16:46

## 2022-01-01 RX ADMIN — ENOXAPARIN SODIUM 40 MG: 40 INJECTION SUBCUTANEOUS at 18:03

## 2022-01-01 RX ADMIN — ASPIRIN 81 MG: 81 TABLET, CHEWABLE ORAL at 04:36

## 2022-01-01 RX ADMIN — POTASSIUM CHLORIDE, DEXTROSE MONOHYDRATE AND SODIUM CHLORIDE: 150; 5; 900 INJECTION, SOLUTION INTRAVENOUS at 00:20

## 2022-01-01 RX ADMIN — POTASSIUM CHLORIDE, DEXTROSE MONOHYDRATE AND SODIUM CHLORIDE: 150; 5; 900 INJECTION, SOLUTION INTRAVENOUS at 14:20

## 2022-01-01 RX ADMIN — OMEPRAZOLE 20 MG: 20 CAPSULE, DELAYED RELEASE ORAL at 05:34

## 2022-01-01 RX ADMIN — OXYCODONE 5 MG: 5 TABLET ORAL at 04:10

## 2022-01-01 RX ADMIN — CYANOCOBALAMIN TAB 500 MCG 1000 MCG: 500 TAB at 04:54

## 2022-01-01 RX ADMIN — TAMSULOSIN HYDROCHLORIDE 0.4 MG: 0.4 CAPSULE ORAL at 09:07

## 2022-01-01 RX ADMIN — DONEPEZIL HYDROCHLORIDE 5 MG: 5 TABLET, FILM COATED ORAL at 04:11

## 2022-01-01 RX ADMIN — ATORVASTATIN CALCIUM 10 MG: 10 TABLET, FILM COATED ORAL at 20:52

## 2022-01-01 RX ADMIN — SENNOSIDES AND DOCUSATE SODIUM 2 TABLET: 50; 8.6 TABLET ORAL at 18:57

## 2022-01-01 RX ADMIN — QUETIAPINE FUMARATE 25 MG: 25 TABLET ORAL at 23:28

## 2022-01-01 RX ADMIN — ASPIRIN 81 MG 81 MG: 81 TABLET ORAL at 04:54

## 2022-01-01 RX ADMIN — DONEPEZIL HYDROCHLORIDE 5 MG: 5 TABLET, FILM COATED ORAL at 05:59

## 2022-01-01 RX ADMIN — ENOXAPARIN SODIUM 40 MG: 40 INJECTION SUBCUTANEOUS at 16:46

## 2022-01-01 RX ADMIN — SODIUM CHLORIDE: 9 INJECTION, SOLUTION INTRAVENOUS at 12:12

## 2022-01-01 RX ADMIN — HALOPERIDOL LACTATE 2 MG: 5 INJECTION, SOLUTION INTRAMUSCULAR at 00:48

## 2022-01-01 RX ADMIN — LEVOFLOXACIN 750 MG: 750 TABLET, FILM COATED ORAL at 13:57

## 2022-01-01 RX ADMIN — DOCUSATE SODIUM 50 MG AND SENNOSIDES 8.6 MG 2 TABLET: 8.6; 5 TABLET, FILM COATED ORAL at 04:05

## 2022-01-01 RX ADMIN — OXYCODONE 5 MG: 5 TABLET ORAL at 16:49

## 2022-01-01 RX ADMIN — CYANOCOBALAMIN TAB 500 MCG 1000 MCG: 500 TAB at 04:16

## 2022-01-01 RX ADMIN — ENOXAPARIN SODIUM 40 MG: 40 INJECTION SUBCUTANEOUS at 17:50

## 2022-01-01 RX ADMIN — DONEPEZIL HYDROCHLORIDE 5 MG: 5 TABLET, FILM COATED ORAL at 06:04

## 2022-01-01 RX ADMIN — LEVOFLOXACIN 750 MG: 750 TABLET, FILM COATED ORAL at 04:04

## 2022-01-01 RX ADMIN — ASPIRIN 81 MG 81 MG: 81 TABLET ORAL at 04:39

## 2022-01-01 RX ADMIN — CEFTRIAXONE SODIUM 2 G: 10 INJECTION, POWDER, FOR SOLUTION INTRAVENOUS at 04:04

## 2022-01-01 RX ADMIN — DOCUSATE SODIUM 50 MG AND SENNOSIDES 8.6 MG 2 TABLET: 8.6; 5 TABLET, FILM COATED ORAL at 04:25

## 2022-01-01 RX ADMIN — TAMSULOSIN HYDROCHLORIDE 0.8 MG: 0.4 CAPSULE ORAL at 13:55

## 2022-01-01 RX ADMIN — ENOXAPARIN SODIUM 40 MG: 40 INJECTION SUBCUTANEOUS at 17:35

## 2022-01-01 RX ADMIN — ENOXAPARIN SODIUM 40 MG: 40 INJECTION SUBCUTANEOUS at 17:48

## 2022-01-01 RX ADMIN — ASPIRIN 81 MG 81 MG: 81 TABLET ORAL at 04:01

## 2022-01-01 RX ADMIN — DOCUSATE SODIUM 50 MG AND SENNOSIDES 8.6 MG 2 TABLET: 8.6; 5 TABLET, FILM COATED ORAL at 16:58

## 2022-01-01 RX ADMIN — CYANOCOBALAMIN TAB 500 MCG 1000 MCG: 500 TAB at 04:25

## 2022-01-01 RX ADMIN — AMOXICILLIN 500 MG: 500 CAPSULE ORAL at 05:03

## 2022-01-01 RX ADMIN — SENNOSIDES AND DOCUSATE SODIUM 2 TABLET: 50; 8.6 TABLET ORAL at 18:12

## 2022-01-01 RX ADMIN — SENNOSIDES AND DOCUSATE SODIUM 2 TABLET: 50; 8.6 TABLET ORAL at 05:33

## 2022-01-01 RX ADMIN — TAMSULOSIN HYDROCHLORIDE 0.8 MG: 0.4 CAPSULE ORAL at 10:28

## 2022-01-01 RX ADMIN — HYDROCORTISONE: 25 OINTMENT TOPICAL at 06:17

## 2022-01-01 RX ADMIN — SENNOSIDES AND DOCUSATE SODIUM 2 TABLET: 50; 8.6 TABLET ORAL at 05:04

## 2022-01-01 RX ADMIN — OXYCODONE 5 MG: 5 TABLET ORAL at 22:45

## 2022-01-01 RX ADMIN — Medication 5 MG: at 20:09

## 2022-01-01 RX ADMIN — Medication 5 MG: at 21:05

## 2022-01-01 RX ADMIN — DOCUSATE SODIUM 50 MG AND SENNOSIDES 8.6 MG 2 TABLET: 8.6; 5 TABLET, FILM COATED ORAL at 16:46

## 2022-01-01 RX ADMIN — FINASTERIDE 5 MG: 5 TABLET, FILM COATED ORAL at 06:04

## 2022-01-01 RX ADMIN — POTASSIUM CHLORIDE AND SODIUM CHLORIDE: 900; 300 INJECTION, SOLUTION INTRAVENOUS at 06:16

## 2022-01-01 RX ADMIN — DONEPEZIL HYDROCHLORIDE 5 MG: 5 TABLET, FILM COATED ORAL at 04:33

## 2022-01-01 RX ADMIN — PIPERACILLIN AND TAZOBACTAM 4.5 G: 4; .5 INJECTION, POWDER, FOR SOLUTION INTRAVENOUS at 09:13

## 2022-01-01 RX ADMIN — ASPIRIN 81 MG: 81 TABLET, CHEWABLE ORAL at 05:03

## 2022-01-01 RX ADMIN — ATORVASTATIN CALCIUM 10 MG: 10 TABLET, FILM COATED ORAL at 17:36

## 2022-01-01 RX ADMIN — OMEPRAZOLE 20 MG: 20 CAPSULE, DELAYED RELEASE ORAL at 06:04

## 2022-01-01 RX ADMIN — OMEPRAZOLE 20 MG: 20 CAPSULE, DELAYED RELEASE ORAL at 04:04

## 2022-01-01 RX ADMIN — DONEPEZIL HYDROCHLORIDE 5 MG: 5 TABLET, FILM COATED ORAL at 04:16

## 2022-01-01 RX ADMIN — ASPIRIN 81 MG 81 MG: 81 TABLET ORAL at 04:04

## 2022-01-01 RX ADMIN — QUETIAPINE FUMARATE 25 MG: 25 TABLET ORAL at 20:58

## 2022-01-01 RX ADMIN — DOCUSATE SODIUM 50 MG AND SENNOSIDES 8.6 MG 2 TABLET: 8.6; 5 TABLET, FILM COATED ORAL at 17:36

## 2022-01-01 RX ADMIN — DONEPEZIL HYDROCHLORIDE 5 MG: 5 TABLET, FILM COATED ORAL at 04:54

## 2022-01-01 RX ADMIN — MORPHINE SULFATE 2 MG: 4 INJECTION INTRAVENOUS at 23:47

## 2022-01-01 RX ADMIN — FINASTERIDE 5 MG: 5 TABLET, FILM COATED ORAL at 04:11

## 2022-01-01 RX ADMIN — DOCUSATE SODIUM 50 MG AND SENNOSIDES 8.6 MG 2 TABLET: 8.6; 5 TABLET, FILM COATED ORAL at 05:38

## 2022-01-01 RX ADMIN — DONEPEZIL HYDROCHLORIDE 5 MG: 5 TABLET, FILM COATED ORAL at 04:02

## 2022-01-01 RX ADMIN — TAMSULOSIN HYDROCHLORIDE 0.8 MG: 0.4 CAPSULE ORAL at 10:21

## 2022-01-01 RX ADMIN — DOCUSATE SODIUM 50 MG AND SENNOSIDES 8.6 MG 2 TABLET: 8.6; 5 TABLET, FILM COATED ORAL at 16:30

## 2022-01-01 RX ADMIN — DONEPEZIL HYDROCHLORIDE 5 MG: 5 TABLET, FILM COATED ORAL at 06:17

## 2022-01-01 RX ADMIN — DOCUSATE SODIUM 50 MG AND SENNOSIDES 8.6 MG 2 TABLET: 8.6; 5 TABLET, FILM COATED ORAL at 04:01

## 2022-01-01 RX ADMIN — TAMSULOSIN HYDROCHLORIDE 0.4 MG: 0.4 CAPSULE ORAL at 09:24

## 2022-01-01 RX ADMIN — FINASTERIDE 5 MG: 5 TABLET, FILM COATED ORAL at 04:16

## 2022-01-01 RX ADMIN — CYANOCOBALAMIN TAB 500 MCG 1000 MCG: 500 TAB at 04:02

## 2022-01-01 RX ADMIN — PIPERACILLIN AND TAZOBACTAM 3.38 G: 3; .375 INJECTION, POWDER, LYOPHILIZED, FOR SOLUTION INTRAVENOUS; PARENTERAL at 23:04

## 2022-01-01 RX ADMIN — ATORVASTATIN CALCIUM 10 MG: 10 TABLET, FILM COATED ORAL at 18:04

## 2022-01-01 RX ADMIN — CYANOCOBALAMIN TAB 500 MCG 1000 MCG: 500 TAB at 04:04

## 2022-01-01 RX ADMIN — ASPIRIN 81 MG: 81 TABLET, CHEWABLE ORAL at 06:17

## 2022-01-01 RX ADMIN — ASPIRIN 81 MG 81 MG: 81 TABLET ORAL at 06:04

## 2022-01-01 RX ADMIN — FINASTERIDE 5 MG: 5 TABLET, FILM COATED ORAL at 18:23

## 2022-01-01 RX ADMIN — DOCUSATE SODIUM 50 MG AND SENNOSIDES 8.6 MG 2 TABLET: 8.6; 5 TABLET, FILM COATED ORAL at 04:39

## 2022-01-01 ASSESSMENT — SOCIAL DETERMINANTS OF HEALTH (SDOH)
ACTIVE STRESSOR - HEALTH CHANGES: 1
ACTIVE STRESSOR - HEALTH CHANGES: 1
ACTIVE STRESSOR - NO STRESS FACTORS: 1
ACTIVE STRESSOR - HEALTH CHANGES: 1

## 2022-01-01 ASSESSMENT — PATIENT HEALTH QUESTIONNAIRE - PHQ9
5. POOR APPETITE OR OVEREATING: SEVERAL DAYS
9. THOUGHTS THAT YOU WOULD BE BETTER OFF DEAD, OR OF HURTING YOURSELF: NOT AT ALL
CLINICAL INTERPRETATION OF PHQ2 SCORE: 0
3. TROUBLE FALLING OR STAYING ASLEEP OR SLEEPING TOO MUCH: SEVERAL DAYS
2. FEELING DOWN, DEPRESSED, IRRITABLE, OR HOPELESS: SEVERAL DAYS
SUM OF ALL RESPONSES TO PHQ9 QUESTIONS 1 AND 2: 2
8. MOVING OR SPEAKING SO SLOWLY THAT OTHER PEOPLE COULD HAVE NOTICED. OR THE OPPOSITE, BEING SO FIGETY OR RESTLESS THAT YOU HAVE BEEN MOVING AROUND A LOT MORE THAN USUAL: NOT AT ALL
SUM OF ALL RESPONSES TO PHQ QUESTIONS 1-9: 5
7. TROUBLE CONCENTRATING ON THINGS, SUCH AS READING THE NEWSPAPER OR WATCHING TELEVISION: NOT AT ALL
1. LITTLE INTEREST OR PLEASURE IN DOING THINGS: SEVERAL DAYS
6. FEELING BAD ABOUT YOURSELF - OR THAT YOU ARE A FAILURE OR HAVE LET YOURSELF OR YOUR FAMILY DOWN: NOT AL ALL
4. FEELING TIRED OR HAVING LITTLE ENERGY: SEVERAL DAYS

## 2022-01-01 ASSESSMENT — ENCOUNTER SYMPTOMS
DENIES PAIN: 1
VOMITING: 0
FATIGUES EASILY: 1
UNABLE TO COMMUNICATE PAIN: 1
FATIGUES EASILY: 1
LAST BOWEL MOVEMENT: 66349
SLEEP QUALITY: POOR
AGITATION: 1
VOMITING: 0
PAIN: 1
FATIGUE: 1
FEVER: 0
HYPOTENSION: 1
HYPOTENSION: 1
FATIGUES EASILY: 1
STOOL FREQUENCY: MORE THAN TWICE DAILY
DRY SKIN: 1
PAIN LOCATION: NECK
STOOL FREQUENCY: MORE THAN TWICE DAILY
HIGHEST PAIN SEVERITY IN PAST 24 HOURS: 2/10
NAUSEA: 0
FEVER: 0
SEIZURES: 0
LAST BOWEL MOVEMENT: 66367
DYSPNEA ON EXERTION: 1
LOSS OF CONSCIOUSNESS: 0
PERSON REPORTING PAIN: DIRECT OBSERVATION
LIMITED RANGE OF MOTION: 1
MUSCLE WEAKNESS: 1
AGITATION: 1
NAUSEA: 0
SLEEP QUALITY: POOR
DRY SKIN: 1
SLEEP QUALITY: POOR
DYSPNEA ON EXERTION: 1
FATIGUE: 1
DRY SKIN: 1
ABDOMINAL PAIN: 1
COUGH: 0
MUSCLE WEAKNESS: 1
PERSON REPORTING PAIN: PATIENT
DYSPNEA ON EXERTION: 1
STOOL FREQUENCY: MORE THAN TWICE DAILY
MUSCLE WEAKNESS: 1
LOWEST PAIN SEVERITY IN PAST 24 HOURS: 0/10
HYPOTENSION: 1
CHILLS: 0
SHORTNESS OF BREATH: 0
CHILLS: 0
FALLS: 0
STOOL FREQUENCY: MORE THAN TWICE DAILY
ABDOMINAL PAIN: 1
LAST BOWEL MOVEMENT: 66349
FATIGUES EASILY: 1
NAUSEA: 0
BOWEL PATTERN NORMAL: 1
SEIZURES: 0
FEVER: 0
SENSORY CHANGE: 0
AGITATION: 1
LAST BOWEL MOVEMENT: 66362
FALLS: 0
INTRACTABLE COUGH: 1
STOOL FREQUENCY: MORE THAN TWICE DAILY
PAIN SEVERITY GOAL: 0/10
PERSON REPORTING PAIN: PATIENT
SHORTNESS OF BREATH: 0
PAIN: 1
VOMITING: 0
HYPOTENSION: 1
DYSPNEA ON EXERTION: 1
CHILLS: 0
FATIGUE: 1
LAST BOWEL MOVEMENT: 66349
LOSS OF CONSCIOUSNESS: 0
DIZZINESS: 0
MEMORY LOSS: 1
HEADACHES: 0
DENIES PAIN: 1
PERSON REPORTING PAIN: DIRECT OBSERVATION
FALLS: 0
AGITATION: 1
INTRACTABLE COUGH: 1
LAST BOWEL MOVEMENT: 66349
MUSCLE WEAKNESS: 1
DYSPNEA ON EXERTION: 1
MUSCLE WEAKNESS: 1
AGITATION: 1
SLEEP QUALITY: POOR
STOOL FREQUENCY: MORE THAN TWICE DAILY
CHILLS: 0
DRY SKIN: 1
FATIGUES EASILY: 1
STOOL FREQUENCY: MORE THAN TWICE DAILY
MUSCLE WEAKNESS: 1
HYPOTENSION: 1
SHORTNESS OF BREATH: 0
SEIZURES: 0
HYPOTENSION: 1
LAST BOWEL MOVEMENT: 66367
BOWEL PATTERN NORMAL: 1
NAUSEA: 0
MUSCLE WEAKNESS: 1
FALLS: 0
FOCAL WEAKNESS: 0
SLEEP QUALITY: POOR
DRY SKIN: 1
LIMITED RANGE OF MOTION: 1
DRY SKIN: 1
FATIGUES EASILY: 1
LIMITED RANGE OF MOTION: 1
VOMITING: 0
MEMORY LOSS: 1
PAIN: 1
FEVER: 0
PERSON REPORTING PAIN: DIRECT OBSERVATION
LOSS OF CONSCIOUSNESS: 0
ABDOMINAL PAIN: 1
AGITATION: 1
MEMORY LOSS: 1
SLEEP QUALITY: POOR
DYSPNEA ON EXERTION: 1
SUBJECTIVE PAIN PROGRESSION: UNCHANGED

## 2022-01-01 ASSESSMENT — PAIN SCALES - WONG BAKER: WONGBAKER_NUMERICALRESPONSE: DOESN'T HURT AT ALL

## 2022-01-01 ASSESSMENT — PAIN DESCRIPTION - PAIN TYPE
TYPE: ACUTE PAIN

## 2022-01-01 ASSESSMENT — COGNITIVE AND FUNCTIONAL STATUS - GENERAL
TURNING FROM BACK TO SIDE WHILE IN FLAT BAD: UNABLE
SUGGESTED CMS G CODE MODIFIER MOBILITY: CN
DRESSING REGULAR UPPER BODY CLOTHING: A LOT
TURNING FROM BACK TO SIDE WHILE IN FLAT BAD: A LITTLE
DRESSING REGULAR LOWER BODY CLOTHING: TOTAL
HELP NEEDED FOR BATHING: A LOT
PERSONAL GROOMING: A LOT
WALKING IN HOSPITAL ROOM: TOTAL
STANDING UP FROM CHAIR USING ARMS: TOTAL
MOBILITY SCORE: 10
CLIMB 3 TO 5 STEPS WITH RAILING: TOTAL
MOVING TO AND FROM BED TO CHAIR: A LOT
DAILY ACTIVITIY SCORE: 13
DRESSING REGULAR UPPER BODY CLOTHING: A LOT
TOILETING: A LOT
SUGGESTED CMS G CODE MODIFIER MOBILITY: CL
MOVING TO AND FROM BED TO CHAIR: UNABLE
SUGGESTED CMS G CODE MODIFIER DAILY ACTIVITY: CL
MOVING FROM LYING ON BACK TO SITTING ON SIDE OF FLAT BED: A LOT
EATING MEALS: A LOT
MOVING FROM LYING ON BACK TO SITTING ON SIDE OF FLAT BED: UNABLE
SUGGESTED CMS G CODE MODIFIER DAILY ACTIVITY: CL
CLIMB 3 TO 5 STEPS WITH RAILING: TOTAL
MOBILITY SCORE: 6
TOILETING: A LOT
HELP NEEDED FOR BATHING: A LOT
WALKING IN HOSPITAL ROOM: TOTAL
STANDING UP FROM CHAIR USING ARMS: TOTAL
EATING MEALS: A LITTLE
DAILY ACTIVITIY SCORE: 11
PERSONAL GROOMING: A LOT
DRESSING REGULAR LOWER BODY CLOTHING: A LOT

## 2022-01-01 ASSESSMENT — ACTIVITIES OF DAILY LIVING (ADL)
CURRENT_FUNCTION: ONE PERSON
DRESSING_REQUIRES_ASSISTANCE: 1
MONEY MANAGEMENT (EXPENSES/BILLS): TOTALLY DEPENDENT
AMBULATION ASSISTANCE: NON-AMBULATORY
MONEY MANAGEMENT (EXPENSES/BILLS): TOTALLY DEPENDENT
AMBULATION ASSISTANCE: NON-AMBULATORY
CONTINENCE_REQUIRES_ASSISTANCE: 1
BATHING_REQUIRES_ASSISTANCE: 1
CURRENT_FUNCTION: ONE PERSON
CONTINENCE_REQUIRES_ASSISTANCE: 1
AMBULATION_REQUIRES_ASSISTANCE: 1
AMBULATION ASSISTANCE: NON-AMBULATORY
PHYSICAL_TRANSFER_REQUIRES_ASSISTANCE: 1
AMBULATION ASSISTANCE: ONE PERSON
BATHING_REQUIRES_ASSISTANCE: 1
MONEY MANAGEMENT (EXPENSES/BILLS): TOTALLY DEPENDENT
MONEY MANAGEMENT (EXPENSES/BILLS): TOTALLY DEPENDENT
PHYSICAL_TRANSFER_REQUIRES_ASSISTANCE: 1
AMBULATION ASSISTANCE: ONE PERSON
AMBULATION_REQUIRES_ASSISTANCE: 1
TOILETING: UNABLE TO DETERMINE AT THIS TIME
MONEY MANAGEMENT (EXPENSES/BILLS): TOTALLY DEPENDENT

## 2022-01-01 ASSESSMENT — PAIN SCALES - PAIN ASSESSMENT IN ADVANCED DEMENTIA (PAINAD)
FACIALEXPRESSION: SMILING OR INEXPRESSIVE
CONSOLABILITY: DISTRACTED OR REASSURED BY VOICE/TOUCH
NEGVOCALIZATION: OCCASIONAL MOAN/GROAN, LOW SPEECH, NEGATIVE/DISAPPROVING QUALITY
BODYLANGUAGE: TENSE, DISTRESSED PACING, FIDGETING
BREATHING: NORMAL
BODYLANGUAGE: 1 - TENSE. DISTRESSED PACING. FIDGETING.
FACIALEXPRESSION: SMILING OR INEXPRESSIVE
FACIALEXPRESSION: 0 - SMILING OR INEXPRESSIVE.
NEGVOCALIZATION: OCCASIONAL MOAN/GROAN, LOW SPEECH, NEGATIVE/DISAPPROVING QUALITY
NEGVOCALIZATION: 1 - OCCASIONAL MOAN OR GROAN. LOW-LEVEL SPEECH WITH A NEGATIVE OR DISAPPROVING QUALITY.
CONSOLABILITY: 1 - DISTRACTED OR REASSURED BY VOICE OR TOUCH.
BREATHING: NORMAL
FACIALEXPRESSION: 1 - SAD. FRIGHTENED. FROWN.
TOTALSCORE: 8
TOTALSCORE: 3
FACIALEXPRESSION: SMILING OR INEXPRESSIVE
CONSOLABILITY: NO NEED TO CONSOLE
NEGVOCALIZATION: 0 - NONE.
FACIALEXPRESSION: SAD, FRIGHTENED, FROWN
BREATHING: NORMAL
BODYLANGUAGE: 0 - RELAXED.
NEGVOCALIZATION: OCCASIONAL MOAN/GROAN, LOW SPEECH, NEGATIVE/DISAPPROVING QUALITY
NEGVOCALIZATION: 1 - OCCASIONAL MOAN OR GROAN. LOW-LEVEL SPEECH WITH A NEGATIVE OR DISAPPROVING QUALITY.
NEGVOCALIZATION: OCCASIONAL MOAN/GROAN, LOW SPEECH, NEGATIVE/DISAPPROVING QUALITY
NEGVOCALIZATION: OCCASIONAL MOAN/GROAN, LOW SPEECH, NEGATIVE/DISAPPROVING QUALITY
CONSOLABILITY: 0 - NO NEED TO CONSOLE.
BREATHING: OCCASIONAL LABORED BREATHING, SHORT PERIOD OF HYPERVENTILATION
BODYLANGUAGE: TENSE, DISTRESSED PACING, FIDGETING
CONSOLABILITY: 0 - NO NEED TO CONSOLE.
FACIALEXPRESSION: 1 - SAD. FRIGHTENED. FROWN.
FACIALEXPRESSION: SMILING OR INEXPRESSIVE
BODYLANGUAGE: 1 - TENSE. DISTRESSED PACING. FIDGETING.
BODYLANGUAGE: RELAXED
BODYLANGUAGE: TENSE, DISTRESSED PACING, FIDGETING
TOTALSCORE: 4
TOTALSCORE: 3
BREATHING: NORMAL
CONSOLABILITY: 1 - DISTRACTED OR REASSURED BY VOICE OR TOUCH.
CONSOLABILITY: DISTRACTED OR REASSURED BY VOICE/TOUCH
TOTALSCORE: 0
TOTALSCORE: 4
CONSOLABILITY: DISTRACTED OR REASSURED BY VOICE/TOUCH
CONSOLABILITY: DISTRACTED OR REASSURED BY VOICE/TOUCH
BODYLANGUAGE: TENSE, DISTRESSED PACING, FIDGETING
FACIALEXPRESSION: SMILING OR INEXPRESSIVE
FACIALEXPRESSION: 0 - SMILING OR INEXPRESSIVE.
BREATHING: NORMAL
BREATHING: OCCASIONAL LABORED BREATHING, SHORT PERIOD OF HYPERVENTILATION
TOTALSCORE: 5
BODYLANGUAGE: RIGID, FISTS CLENCHED, KNEES UP, PUSHING/PULLING AWAY, STRIKES OUT
NEGVOCALIZATION: REPEATED TROUBLED CALLING OUT, LOUD MOANING/GROANING, CRYING
BODYLANGUAGE: 1 - TENSE. DISTRESSED PACING. FIDGETING.
TOTALSCORE: 3
CONSOLABILITY: DISTRACTED OR REASSURED BY VOICE/TOUCH
FACIALEXPRESSION: FACIAL GRIMACING
TOTALSCORE: 2
BODYLANGUAGE: TENSE, DISTRESSED PACING, FIDGETING
NEGVOCALIZATION: 1 - OCCASIONAL MOAN OR GROAN. LOW-LEVEL SPEECH WITH A NEGATIVE OR DISAPPROVING QUALITY.
TOTALSCORE: 4
CONSOLABILITY: NO NEED TO CONSOLE
TOTALSCORE: 0

## 2022-01-01 ASSESSMENT — LIFESTYLE VARIABLES
HAVE YOU EVER FELT YOU SHOULD CUT DOWN ON YOUR DRINKING: NO
EVER FELT BAD OR GUILTY ABOUT YOUR DRINKING: NO
HOW MANY TIMES IN THE PAST YEAR HAVE YOU HAD 5 OR MORE DRINKS IN A DAY: 0
ON A TYPICAL DAY WHEN YOU DRINK ALCOHOL HOW MANY DRINKS DO YOU HAVE: 0
TOTAL SCORE: 0
AVERAGE NUMBER OF DAYS PER WEEK YOU HAVE A DRINK CONTAINING ALCOHOL: 0
TOTAL SCORE: 0
DO YOU DRINK ALCOHOL: YES
HAVE PEOPLE ANNOYED YOU BY CRITICIZING YOUR DRINKING: NO
TOTAL SCORE: 0
EVER HAD A DRINK FIRST THING IN THE MORNING TO STEADY YOUR NERVES TO GET RID OF A HANGOVER: NO
HAVE YOU EVER FELT YOU SHOULD CUT DOWN ON YOUR DRINKING: NO
HAVE PEOPLE ANNOYED YOU BY CRITICIZING YOUR DRINKING: NO
TOTAL SCORE: 0
TOTAL SCORE: 0
CONSUMPTION TOTAL: INCOMPLETE
EVER HAD A DRINK FIRST THING IN THE MORNING TO STEADY YOUR NERVES TO GET RID OF A HANGOVER: NO
TOTAL SCORE: 0
EVER FELT BAD OR GUILTY ABOUT YOUR DRINKING: NO
ALCOHOL_USE: NO
CONSUMPTION TOTAL: NEGATIVE
DOES PATIENT WANT TO STOP DRINKING: NO

## 2022-01-01 ASSESSMENT — FIBROSIS 4 INDEX
FIB4 SCORE: 2.14
FIB4 SCORE: 1.74
FIB4 SCORE: 2.38
FIB4 SCORE: 2.4

## 2022-01-01 ASSESSMENT — GAIT ASSESSMENTS: GAIT LEVEL OF ASSIST: UNABLE TO PARTICIPATE

## 2022-07-24 PROBLEM — R27.0 ATAXIA: Status: ACTIVE | Noted: 2022-01-01

## 2022-07-24 PROBLEM — F03.90 DEMENTIA (HCC): Status: ACTIVE | Noted: 2022-01-01

## 2022-07-24 NOTE — ASSESSMENT & PLAN NOTE
Reported ataxia and dizziness with leaning to the left from patient's facility  Hx dementia, alert and oriented only to self, appears at baseline mentation  CT head normal, carotid US normal  Lipid panel, A1c, B12, ammonia, TSH all normal.  Discussed with son, who is ok with patient discharging back to memory care facility. Will defer MRI as low suspicion of stroke and patient does not tolerate MRI well in the past. Patient is medically cleared to discharge back to care facility.

## 2022-07-24 NOTE — PROGRESS NOTES
Report from [facility]. Pt resting in bed. AOx1, no signs or symptoms of respiratory distress, pt denies pain, reports slight dizziness. Discussed plan of care with pt. Call light within reach.

## 2022-07-24 NOTE — ED NOTES
Pt urinated just after arrival. Depends removed and attempted to use urinal for sample. Unsuccessful.

## 2022-07-24 NOTE — H&P
Hospital Medicine History & Physical Note    Date of Service  7/24/2022    Primary Care Physician  No primary care provider on file.      Code Status  Full Code    Chief Complaint  Chief Complaint   Patient presents with   • ALOC     Pt arrives via EMS with c/o altered mental status. Pt has history of dementia and is baseline axox1. Pt is the same at this time. EMS reports facility c/o pt not acting normally and leaning to left. EMS states pt was not leaning when they arrived. Pt does state that he is dizzy, but reports that he is dizzy often.  per EMS       History of Presenting Illness  Johann Davidson is a 87 y.o. male who presented 7/24/2022 with dizziness, ataxia, leaning to left. Patient with history of dementia alert and oriented to self at baseline, hypertension, who presents from his facility for concern for dizziness, leaning to left, ataxia. Patient seen at bedside, he is only oriented to self. He claims he came from [place] 3 days ago and is still getting settled. He is able to converse with me but does not know how he got here. He states his son Tad lives in [place]. Not able to obtain any more meaningful history from patient as he denies any symptoms. No family contact information in chart. Unsure even which facility patient came from.  In the ED, /107. Hgb 12.4, CBC and CMP otherwise fairly unremarkable. EKG with RBBB, CXR with cardiomegaly and interstitial infiltrates. CT head with prominent lateral and third ventricles otherwise no acute abnormalities. Patient will be admitted for possible stroke and evaluation.    I discussed the plan of care with patient.    Review of Systems  Review of Systems   Constitutional: Negative for chills and fever.   Respiratory: Negative for cough and shortness of breath.    Cardiovascular: Negative for chest pain.   Gastrointestinal: Negative for nausea and vomiting.   Musculoskeletal: Negative for falls.   Neurological: Negative for dizziness, sensory  change, focal weakness and headaches.       Past Medical History   has a past medical history of Benign prostate hyperplasia, Dementia (HCC), GERD without esophagitis, and Secondary hypertension.    Surgical History   has no past surgical history on file.     Family History  family history is not on file.   Family history reviewed with patient. There is no family history that is pertinent to the chief complaint.     Social History   reports that he quit smoking about 37 years ago. His smoking use included cigarettes. He has never used smokeless tobacco. He reports previous alcohol use. He reports that he does not use drugs.    Allergies  Allergies   Allergen Reactions   • Ciprofloxacin    • Lidocaine    • Tetanus Toxoid        Medications  None       Physical Exam  Temp:  [36.9 °C (98.4 °F)] 36.9 °C (98.4 °F)  Pulse:  [63] 63  Resp:  [18] 18  BP: (135)/(107) 135/107  SpO2:  [96 %] 96 %  Blood Pressure : (!) 135/107   Temperature: 36.9 °C (98.4 °F)   Pulse: 63   Respiration: 18   Pulse Oximetry: 96 %       Physical Exam  Constitutional:       General: He is not in acute distress.     Appearance: He is not ill-appearing.   HENT:      Head: Normocephalic and atraumatic.      Right Ear: External ear normal.      Left Ear: External ear normal.      Mouth/Throat:      Pharynx: No oropharyngeal exudate or posterior oropharyngeal erythema.   Eyes:      Extraocular Movements: Extraocular movements intact.      Pupils: Pupils are equal, round, and reactive to light.   Cardiovascular:      Rate and Rhythm: Normal rate and regular rhythm.      Pulses: Normal pulses.      Heart sounds: Normal heart sounds.   Pulmonary:      Effort: Pulmonary effort is normal. No respiratory distress.      Breath sounds: Normal breath sounds. No wheezing or rales.   Abdominal:      General: Bowel sounds are normal. There is no distension.      Palpations: Abdomen is soft.      Tenderness: There is no abdominal tenderness.   Musculoskeletal:          General: No swelling or tenderness.      Cervical back: Normal range of motion and neck supple.   Skin:     General: Skin is warm and dry.   Neurological:      Mental Status: He is disoriented.      Comments: Unable to perform complete neuro exam due to patient participation; moves all extremities spontaneously,  strength 4/5 bilaterally, CN's intact   Psychiatric:         Mood and Affect: Mood normal.         Behavior: Behavior normal.         Laboratory:  Recent Labs     07/24/22  0956   WBC 7.5   RBC 3.93*   HEMOGLOBIN 12.4*   HEMATOCRIT 37.1*   MCV 94.4   MCH 31.6   MCHC 33.4*   RDW 43.5   PLATELETCT 282   MPV 9.5     Recent Labs     07/24/22  0956   SODIUM 141   POTASSIUM 4.2   CHLORIDE 107   CO2 23   GLUCOSE 72   BUN 18   CREATININE 1.21   CALCIUM 9.0     Recent Labs     07/24/22  0956   ALTSGPT 11   ASTSGOT 23   ALKPHOSPHAT 56   TBILIRUBIN 0.8   GLUCOSE 72         No results for input(s): NTPROBNP in the last 72 hours.      No results for input(s): TROPONINT in the last 72 hours.    Imaging:  CT-HEAD W/O   Final Result         1. No acute intracranial abnormality. No evidence of acute intracranial hemorrhage or mass lesion.      2. Prominent lateral and third ventricles.               DX-CHEST-PORTABLE (1 VIEW)   Final Result      1.  Cardiomegaly.      2.  Bilateral interstitial infiltrates.      US-CAROTID DOPPLER BILAT    (Results Pending)       EKG:  I have personally reviewed the images and compared with prior images.    Assessment/Plan:  Justification for Admission Status  I anticipate this patient is appropriate for observation status at this time because evaluation of possible stroke is expected to take <2 midnights.    Patient will need a Telemetry bed on NEUROLOGY service .  The need is secondary to stroke evaluation.    * Ataxia- (present on admission)  Assessment & Plan  Reported ataxia and dizziness with leaning to the left from patient's facility  Hx dementia, alert and oriented only to  self, appears at baseline mentation  Will begin stroke evaluation  Permissive HTN  CT head normal, carotid US ordered  Check lipid panel, A1c  Check urinalysis, vitamin B12, vitamin D, ammonia, TSH  Aspirin statin  Neuro checks q4h  Hold off on MRI for now until more information can be obtained from family/guardian  Palliative discussion needed, palliative care consulted  PT/OT/SLP evaluations    Dementia (HCC)  Assessment & Plan  History of,  Alert and oriented to self at baseline per report, he appears at mental baseline      VTE prophylaxis: SCDs/TEDs

## 2022-07-24 NOTE — ED NOTES
"Attempted to use urinal again for sample. Unsuccessful. Pt refusing straight cath at this time, stating \"just give me some water.\" Pt also refusing to take ASA and Senokot at this time.  "

## 2022-07-24 NOTE — ASSESSMENT & PLAN NOTE
History of,  More confused today  Interventions to minimize the risk of delirium.   -do not disturb patient (vitals or lab draws) between the hours of 10 PM and 6 AM.  -frequent reorientation  -avoid sedatives  -up in chair for meals  -watch for constipation  -remove all unnecessary lines

## 2022-07-24 NOTE — ED TRIAGE NOTES
Pt arrives via EMS with c/o altered mental status. Pt has history of dementia and is baseline axox1. Pt is the same at this time. EMS reports facility c/o pt not acting normally and leaning to left. EMS states pt was not leaning when they arrived. Pt does report that he is dizzy, but also states he is dizzy often.  per EMS.

## 2022-07-24 NOTE — ED PROVIDER NOTES
ED Provider Note    CHIEF COMPLAINT  Chief Complaint   Patient presents with   • ALOC     Pt arrives via EMS with c/o altered mental status. Pt has history of dementia and is baseline axox1. Pt is the same at this time. EMS reports facility c/o pt not acting normally and leaning to left. EMS states pt was not leaning when they arrived. Pt does state that he is dizzy, but reports that he is dizzy often.  per EMS       HPI  Johann Davidson is a 87 y.o. male who presents with a past medical history significant for dementia, he is usually oriented to person only.  This is his baseline.  He was sent in by the care facility stating that he has had episodes where he leans to the left and dizziness.  There was concern that he is having a cerebellar stroke.  The patient here does state that it is August instead of July, he does know his name.  He denies any focal numbness or weakness.  He is complaining of right shoulder pain that is worse when he tries to move the shoulder.    REVIEW OF SYSTEMS  See HPI for further details. All other systems are negative.     PAST MEDICAL HISTORY   has a past medical history of Benign prostate hyperplasia, Dementia (HCC), GERD without esophagitis, and Secondary hypertension.    SOCIAL HISTORY  Social History     Tobacco Use   • Smoking status: Former Smoker     Types: Cigarettes     Quit date: 1985     Years since quittin.0   • Smokeless tobacco: Never Used   Vaping Use   • Vaping Use: Never used   Substance and Sexual Activity   • Alcohol use: Not Currently   • Drug use: Never   • Sexual activity: Not on file       SURGICAL HISTORY  patient denies any surgical history    CURRENT MEDICATIONS  Cannot be obtained second the patient's baseline dementia    ALLERGIES  Allergies   Allergen Reactions   • Ciprofloxacin    • Lidocaine    • Tetanus Toxoid        FAMILY HISTORY  No pertinent family history    PHYSICAL EXAM  VITAL SIGNS: BP (!) 135/107   Pulse 63   Temp 36.9 °C (98.4 °F)  "(Temporal)   Resp 18   Ht 1.803 m (5' 11\")   Wt 90.7 kg (200 lb)   SpO2 96%   BMI 27.89 kg/m²  @ALKA[[id number]::@   Pulse ox interpretation: I interpret this pulse ox as normal.  Constitutional: Alert.  HENT: No signs of trauma, Bilateral external ears normal, Nose normal.   Eyes: Pupils are equal and reactive, Conjunctiva normal, Non-icteric.   Neck: Normal range of motion, No tenderness, Supple, No stridor.   Lymphatic: No lymphadenopathy noted.   Cardiovascular: Regular rate and rhythm, no murmurs.   Thorax & Lungs: Normal breath sounds, No respiratory distress, No wheezing, No chest tenderness.   Abdomen: Bowel sounds normal, Soft, No tenderness, No masses, No pulsatile masses. No peritoneal signs.  Skin: Warm, Dry, No erythema, No rash.   Back: No bony tenderness, No CVA tenderness.   Extremities: Intact distal pulses, No edema, No tenderness, No cyanosis.  Musculoskeletal: Pain when I raise the right arm in the shoulder.  Neurologic: Alert to person only, Normal motor function, Normal sensory function, No focal deficits noted.   Psychiatric: Affect normal, Judgment normal, Mood normal.       DIAGNOSTIC STUDIES / PROCEDURES    EKG  Sinus rhythm rate 63  Prolonged CT interval 269  Prolonged  consistent with  Right bundle branch block  Inferolateral infarct, old  No previous ECG available for comparison  My impression of this EKG, right bundle branch block, first-degree AV block, nonspecific ST-T wave changes, does not meet STEMI criteria at this time      LABS  Labs Reviewed   CBC WITH DIFFERENTIAL - Abnormal; Notable for the following components:       Result Value    RBC 3.93 (*)     Hemoglobin 12.4 (*)     Hematocrit 37.1 (*)     MCHC 33.4 (*)     Lymphocytes 19.00 (*)     Monos (Absolute) 0.92 (*)     All other components within normal limits   ESTIMATED GFR - Abnormal; Notable for the following components:    GFR (CKD-EPI) 58 (*)     All other components within normal limits   COMP METABOLIC PANEL "   DIAGNOSTIC ALCOHOL   URINE DRUG SCREEN   URINALYSIS   HEMOGLOBIN A1C   POCT GLUCOSE         RADIOLOGY  CT-HEAD W/O   Final Result         1. No acute intracranial abnormality. No evidence of acute intracranial hemorrhage or mass lesion.      2. Prominent lateral and third ventricles.               DX-CHEST-PORTABLE (1 VIEW)   Final Result      1.  Cardiomegaly.      2.  Bilateral interstitial infiltrates.      US-CAROTID DOPPLER BILAT    (Results Pending)           COURSE & MEDICAL DECISION MAKING  Pertinent Labs & Imaging studies reviewed. (See chart for details)    The patient presents with possible signs and symptoms of cerebellar stroke.  CT scan was ordered, labs ordered, EKG was ordered    CT scan is negative for acute disease.    The patient is not a candidate for IV alteplase for stroke because his symptoms are very vague and do not clearly represent stroke.    I spoke with the hospitalist to assess the patient for hospitalization.  An attempt was made to straight cath the patient but he had urinated in his brief just after arrival.  We will place a condom cath to test his urine.  The urine test is pending.        FINAL IMPRESSION  1. Ataxia     2. Dizziness                Electronically signed by: Dwayne Parra M.D., 7/24/2022 10:56 AM

## 2022-07-24 NOTE — PALLIATIVE CARE
Palliative Care follow-up  Consult received and EMR reviewed noting pt came from [facility] and has no emergency contact information available. Call placed to [facility] and she provided this RN with pt's son's contact information- Tad Davidson [phone]. [facility] updated. She states she faxed information to the BS team as well. She tells PC that he just arrived to their facility within the past couple days, so she does not have much information or POA documentation for him.       Updated: MD/RN    Plan: formal consult pending    Thank you for allowing Palliative Care to support this patient and family. Contact [phone] for additional assistance, change in patient status, or with any questions/concerns.

## 2022-07-24 NOTE — ED NOTES
Med rec updated and complete, per MAR from [facility] [phone]  Allergies reviewed, per MAR from [facility]  Pt was just placed at [facility] on 7/23/2022, pt refused all medications except his ATORVASTATIN 10MG.

## 2022-07-24 NOTE — PROGRESS NOTES
Assumed care of pt. Call light in reach. Bed in lowest position. Care of plan discussed with pt . Communication board updated. Pt alert to self only. Sore noted to back of right heal. Pt kicking and pulling tele leads off. Admitting MD aware. Pt be off tele currently.  Assessment completed.

## 2022-07-25 NOTE — PROGRESS NOTES
Pt refused urinal and urinated in bed. While cleaning and performing bed change, patient screaming, hitting, kicking, and spitting on staff. PRN Haldol administered IM.

## 2022-07-25 NOTE — PROGRESS NOTES
MD aware of  abrasions to right upper arm from pt scratching arm throughout the night, refusing tele monitoring, and IVF

## 2022-07-25 NOTE — CARE PLAN
The patient is Stable - Low risk of patient condition declining or worsening         Progress made toward(s) clinical / shift goals:    Problem: Optimal Care of the Stroke Patient  Goal: Optimal emergency care for the stroke patient  Outcome: Progressing       Patient is not progressing towards the following goals:

## 2022-07-25 NOTE — THERAPY
Missed Therapy     Patient Name: Johann Davidson  Age:  87 y.o., Sex:  male  Medical Record #: [id number]  Today's Date: 7/25/2022    OT consult rec'd. Attempted to see pt for OT eval. Pt lethargic, very confused, and unable to follow commands; RN reports recently given haldol d/t aggressive behavior toward nsg staff. Will hold and will attempt as appropriate/able.

## 2022-07-25 NOTE — PROGRESS NOTES
[facility] Medicine Daily Progress Note    Date of Service  7/25/2022    Chief Complaint  Johann Davidson is a 87 y.o. male admitted 7/24/2022 with dizziness.    Hospital Course  Johann Davidson is a 87 y.o. male who presented 7/24/2022 with dizziness, ataxia, leaning to left. Patient with history of dementia alert and oriented to self at baseline, hypertension, who presents from his facility for concern for dizziness, leaning to left, ataxia. Patient seen at bedside, he is only oriented to self. He claims he came from [place] 3 days ago and is still getting settled. He is able to converse with me but does not know how he got here. He states his son Tad lives in [place]. Not able to obtain any more meaningful history from patient as he denies any symptoms. No family contact information in chart. Unsure even which facility patient came from.  In the ED, /107. Hgb 12.4, CBC and CMP otherwise fairly unremarkable. EKG with RBBB, CXR with cardiomegaly and interstitial infiltrates. CT head with prominent lateral and third ventricles otherwise no acute abnormalities. Patient will be admitted for possible stroke and evaluation.    Interval Problem Update  Admitted yesterday, sent from care facility for reported dizziness and patient leaning to left.  Continue stroke evaluation, MRI deferred for now until goals of care discussion with son Tad. Left voicemail for son, have been unable to reach him.  Patient combative overnight, given haldol. Continue to monitor behavior.  A1c, lipid panel, ammonia, B12, TSH levels normal.  Carotid US with no significant stenosis.  Start vitamin D for deficiency.  PT/OT/SLP evaluation as able. Patient has been lethargic after haldol administration today.    Addendum: Spoke with son Tad on the phone, phone # [phone]. Patient has hx dementia with memory loss but is usually pretty with it mentally. He did arrive from [place] few days ago to [place] as patient reported. Discussed hospital  course thus far with son. Will hold off on MRI for now given low suspicion of acute stroke and patient does not tolerate MRI well due to claustrophobia. Patient with hx of ankle injury s/p surgery and has been wheelchair bound for past few months, but has been working with PT to increase functional mobility. At this time, patient appears to be at mental and physical baseline. Son prefers patient discharge back to memory care facility and will hire PT for patient on his own. Defers SNF or HH options. Patient is medically cleared at this time to discharge back to his facility.     I have discussed this patient's plan of care and discharge plan at IDT rounds today with Case Management, Nursing, Nursing leadership, and other members of the IDT team.      Code Status  Full Code    Disposition  Patient is medically cleared for discharge.   Anticipate discharge back to memory care facility.  I have placed the appropriate orders for post-discharge needs.    Review of Systems  Review of Systems   Unable to perform ROS: Acuity of condition        Physical Exam  Temp:  [36.7 °C (98 °F)-37.2 °C (99 °F)] 36.7 °C (98 °F)  Pulse:  [58-66] 58  Resp:  [16-18] 16  BP: (134-145)/(55-60) 145/60  SpO2:  [90 %-98 %] 98 %    Physical Exam  Constitutional:       General: He is not in acute distress.     Appearance: He is not ill-appearing.   HENT:      Head: Normocephalic and atraumatic.      Right Ear: External ear normal.      Left Ear: External ear normal.      Mouth/Throat:      Pharynx: No oropharyngeal exudate or posterior oropharyngeal erythema.   Eyes:      Extraocular Movements: Extraocular movements intact.      Pupils: Pupils are equal, round, and reactive to light.   Cardiovascular:      Rate and Rhythm: Normal rate and regular rhythm.      Pulses: Normal pulses.      Heart sounds: Normal heart sounds.   Pulmonary:      Effort: Pulmonary effort is normal. No respiratory distress.      Breath sounds: Normal breath sounds. No  wheezing.   Abdominal:      General: Bowel sounds are normal. There is no distension.      Palpations: Abdomen is soft.      Tenderness: There is no abdominal tenderness.   Musculoskeletal:         General: No swelling or tenderness.      Cervical back: Normal range of motion and neck supple.   Skin:     General: Skin is warm and dry.   Neurological:      General: No focal deficit present.      Mental Status: He is disoriented.      Comments: Moves all extremities spontaneously   Psychiatric:         Mood and Affect: Mood normal.         Behavior: Behavior normal.         Fluids  No intake or output data in the 24 hours ending 07/25/22 1601    Laboratory  Recent Labs     07/24/22  0956   WBC 7.5   RBC 3.93*   HEMOGLOBIN 12.4*   HEMATOCRIT 37.1*   MCV 94.4   MCH 31.6   MCHC 33.4*   RDW 43.5   PLATELETCT 282   MPV 9.5     Recent Labs     07/24/22  0956   SODIUM 141   POTASSIUM 4.2   CHLORIDE 107   CO2 23   GLUCOSE 72   BUN 18   CREATININE 1.21   CALCIUM 9.0             Recent Labs     07/25/22  0010   TRIGLYCERIDE 61   HDL 41   LDL 61       Imaging  US-CAROTID DOPPLER BILAT   Final Result      CT-HEAD W/O   Final Result         1. No acute intracranial abnormality. No evidence of acute intracranial hemorrhage or mass lesion.      2. Prominent lateral and third ventricles.               DX-CHEST-PORTABLE (1 VIEW)   Final Result      1.  Cardiomegaly.      2.  Bilateral interstitial infiltrates.           Assessment/Plan  * Ataxia- (present on admission)  Assessment & Plan  Reported ataxia and dizziness with leaning to the left from patient's facility  Hx dementia, alert and oriented only to self, appears at baseline mentation  CT head normal, carotid US normal  Lipid panel, A1c, B12, ammonia, TSH all normal.  Discussed with son, who is ok with patient discharging back to memory care facility. Will defer MRI as low suspicion of stroke and patient does not tolerate MRI well in the past. Patient is medically cleared to  discharge back to care facility.    Dementia (HCC)  Assessment & Plan  History of,  Alert and oriented to self at baseline per report, he appears at mental baseline       VTE prophylaxis: SCDs/TEDs    I have performed a physical exam and reviewed and updated ROS and Plan today (7/25/2022). In review of yesterday's note (7/24/2022), there are no changes except as documented above.

## 2022-07-25 NOTE — THERAPY
Speech Language Pathology   Clinical Swallow Evaluation     Patient Name: Johann Davidson  AGE:  87 y.o., SEX:  male  Medical Record #: [id number]  Today's Date: 7/25/2022     Precautions: Fall Risk, Swallow Precautions ( See Comments)  Comments: hx dementia; lethargic; HIGH aspiration risk    Patient is 87 y.o. male admitted 7/24/22 for AMS. PMHx: dementia (baseline AAOx1), HTN.    CT of head:  1. No acute intracranial abnormality. No evidence of acute intracranial hemorrhage or mass lesion.  2. Prominent lateral and third ventricles.    CXR:  1.  Cardiomegaly.  2.  Bilateral interstitial infiltrates.      Level of Consciousness: Lethargic  Affect/Behavior: Cooperative  Follows Directives: Inconsistent  Orientation: Self  Hearing: Functional hearing  Vision: Functional vision    Subjective: Patient received sitting up in bed s/p linen change w/ RN and CNA. Patient w/ eyes closed but responsive to verbal stimuli >50% of the time. Pt w/ progressive left lean and required repositioning to upright position intermittently.     Prior Living Situation & Level of Function: Pt is a non-historian. Not seen by SLP in the past in this setting.       Oral Mechanism Evaluation  Facial Symmetry: Equal  Facial Sensation: Pt did not follow commands to assess  Labial Observations: Pt did not follow commands to assess  Lingual Observations: Midline  Dentition: Missing posterior dentition on left side   Comments:    Voice  Quality: WFL  Resonance: WFL  Intensity: Appropriate  Cough: Pt did not follow commands to assess  Comments:    Current Method of Nutrition   NPO until cleared by speech pathology       Assessment  Positioning: Mathews's (60-90 degrees)  Bolus Administration: SLP  Oxygen Requirements: Room Air  Factor(s) Affecting Performance: Impaired mental status, Impaired command following    Swallowing Trials  Ice: Impaired  Thin Liquid (TN0): Impaired  Mildly Thick Liquid (MT2): Impaired  Liquidised (LQ3): Impaired  Pureed (PU4):  Impaired    Comments: Delayed bolus acceptance requiring tactile stim and consistent verbal cueing to open lips for presentation of spoon and cup. Bolus containment adequate. Onset of swallow trigger was min-moderately delayed. Throat clearing response occurred in 1/3 trials of small cup sips of thins from cup concerning for aspiration. No overt s/sx of aspiration with all other trials tested; however, trials were limited given persistent lethargy during session. RN and MD updated - per MD OK to keep pt NPO w/ reassessment tomorrow AM.         Clinical Impressions  Patient is presenting with clinical signs of oropharyngeal dysphagia suspect acute r/t lethargy and AMS. Patient is not at the level for a PO diet and continuation of NPO status is indicated. OK for pt to have meds crushed in applesauce or pudding when awake/alert only. SLP following closely and will reassess tomorrow AM as appropriate.       Recommendations  1. Continue NPO  2.  Instrumentation: Instrumental swallow study pending clinical progress  3.  Swallowing Instructions & Precautions:   Medication: Crush with applesauce or puree, as appropriate  Oral Care: Q4h     Plan  Recommend Speech Therapy 3 times per week until therapy goals are met for the following treatments:  Dysphagia Training and Patient / Family / Caregiver Education.    Discharge Recommendations: Recommend post-acute placement for additional speech therapy services prior to discharge home       07/25/22 8429   Prior Level Of Function   Communication Impaired   Swallow Unknown   Dentition Intact   Dentures None   Hearing Within Functional Limits for Evaluation   Patient's Primary Language English   Short Term Goals   Short Term Goal # 1 Patient will consume prefeeding trials with SLP with no overt s/sx of aspiration.

## 2022-07-25 NOTE — THERAPY
Missed Therapy     Patient Name: Joahnn Davidson  Age:  87 y.o., Sex:  male  Medical Record #: [id number]  Today's Date: 7/25/2022    PT order received, pt not appropriate for PT evaluation today per conversation with RN. Will hold and follow up as able    Margarita Peacock PT, DPT

## 2022-07-25 NOTE — DISCHARGE SUMMARY
Discharge Summary    CHIEF COMPLAINT ON ADMISSION  Chief Complaint   Patient presents with   • ALOC     Pt arrives via EMS with c/o altered mental status. Pt has history of dementia and is baseline axox1. Pt is the same at this time. EMS reports facility c/o pt not acting normally and leaning to left. EMS states pt was not leaning when they arrived. Pt does state that he is dizzy, but reports that he is dizzy often.  per EMS       Reason for Admission  EMS     Admission Date  7/24/2022    CODE STATUS  DNAR/DNI    HPI & HOSPITAL COURSE  Johann Davidson is a 87 y.o. male who presented 7/24/2022 with dizziness, ataxia, leaning to left. Patient with history of dementia alert and oriented to self at baseline, hypertension, who presents from his facility for concern for dizziness, leaning to left, ataxia. Patient seen at bedside, he is only oriented to self. He claims he came from [place] 3 days ago and is still getting settled. He is able to converse with me but does not know how he got here. He states his son Tad lives in [place]. Not able to obtain any more meaningful history from patient as he denies any symptoms. No family contact information in chart. Unsure even which facility patient came from.  In the ED, /107. Hgb 12.4, CBC and CMP otherwise fairly unremarkable. EKG with RBBB, CXR with cardiomegaly and interstitial infiltrates. CT head with prominent lateral and third ventricles otherwise no acute abnormalities. Patient will be admitted for possible stroke and evaluation.  MRI was not obtained due to severe claustrophobia.  Hospital course complicated by urinary retention and UTI.  He had tight phimosis so nursing staff unable to place Redd, urology consulted and placed Redd catheter on 7/27.  He was started on ceftriaxone however ucx returned Pseudomonas and Enterococcus, he was transitioned to Zosyn.  Patient had right arm excoriations and itching with some surrounding erythema and warmth (already  on antibiotics for UTI).  Started hydrocortisone and Aquaphor. Wound team was consulted for R heel wound which recommended wound care and ZOEY, ZOEY normal.       Therefore, he is discharged in fair and stable condition to home with close outpatient follow-up.      Discharge Date  07/31/22      FOLLOW UP ITEMS POST DISCHARGE  F/u with urology for urinary retention and tight phimosis   Monitor heel wound    DISCHARGE DIAGNOSES  Principal Problem:    Ataxia POA: Yes  Active Problems:    Dementia (HCC) POA: Unknown    Vitamin D deficiency POA: Unknown    Acute cystitis without hematuria POA: Unknown    Urinary retention POA: Unknown    Multiple excoriations POA: Unknown  Resolved Problems:    Dysphagia POA: Unknown      FOLLOW UP  No future appointments.  No follow-up provider specified.    MEDICATIONS ON DISCHARGE     Medication List      START taking these medications      Instructions   acetaminophen 325 MG Tabs  Commonly known as: Tylenol   Take 2 Tablets by mouth every 6 hours as needed for Mild Pain or Moderate Pain.  Dose: 650 mg     amoxicillin 500 MG Caps  Commonly known as: AMOXIL   Take 1 Capsule by mouth every 12 hours for 2 days.  Dose: 500 mg     hydrocortisone 2.5 % Oint   Apply to R bicep area where excoriations are twice daily x 7 days     melatonin 5 mg Tabs   Take 1 Tablet by mouth every evening.  Dose: 5 mg     mineral oil-pet hydrophilic Oint   Please apply to R arm twice daily (first hydrocortisone then aquaphor on top)     tamsulosin 0.4 MG capsule  Commonly known as: FLOMAX   Take 1 Capsule by mouth 1/2 hour after breakfast.  Dose: 0.4 mg     vitamin D2 (Ergocalciferol) 1.25 MG (86012 UT) Caps capsule  Start taking on: August 1, 2022  Commonly known as: Drisdol   Take 1 Capsule by mouth every 7 days.  Dose: 50,000 Units        CONTINUE taking these medications      Instructions   aspirin 81 MG Chew chewable tablet  Commonly known as: ASA   Chew 81 mg every day. Pt refused on 723/2022  Dose: 81  mg     atorvastatin 10 MG Tabs  Commonly known as: LIPITOR   Take 10 mg by mouth every evening.  Dose: 10 mg     omeprazole 20 MG delayed-release capsule  Commonly known as: PRILOSEC   Take 20 mg by mouth every day. Pt refused on 7/23/2022  Dose: 20 mg     QUEtiapine 25 MG Tabs  Commonly known as: Seroquel   Take 25 mg by mouth at bedtime. Per MAR pt refused on 7/23/2022  Dose: 25 mg     Vitamin A & D Oint   Apply 1 Application topically every day. Per MAR apply's on right ankle, pt refused on 7/23/2022  Dose: 1 Application     vitamin B-12 CR 1000 MCG Tbcr tablet  Commonly known as: CYANOCOBALAMIN   Take 1,000 mcg by mouth every day. Per MAR pt refused on 7/23/2022  Dose: 1,000 mcg        STOP taking these medications    bacitracin 500 UNIT/GM ointment            Allergies  Allergies   Allergen Reactions   • Ciprofloxacin      Per MAR from [facility]    • Lidocaine      Per MAR from [facility]    • Tetanus Toxoid      Per MAR from [facility]        DIET  Orders Placed This Encounter   Procedures   • Diet Order Diet: Regular     Standing Status:   Standing     Number of Occurrences:   1     Order Specific Question:   Diet:     Answer:   Regular [1]       ACTIVITY  As tolerated.  Weight bearing as tolerated    CONSULTATIONS  Urology    PROCEDURES  None    Discharge exam:  Physical Exam  Constitutional:       General: He is not in acute distress.     Appearance: He is not toxic-appearing or diaphoretic.   HENT:      Head: Normocephalic and atraumatic.      Nose: Nose normal.      Mouth/Throat:      Mouth: Mucous membranes are moist.   Eyes:      General: No scleral icterus.     Conjunctiva/sclera: Conjunctivae normal.   Cardiovascular:      Rate and Rhythm: Normal rate and regular rhythm.      Heart sounds: No murmur heard.    No friction rub. No gallop.   Pulmonary:      Effort: Pulmonary effort is normal.      Breath sounds: Normal breath sounds.   Abdominal:      General: Bowel sounds are normal. There is no  distension.      Palpations: Abdomen is soft.      Tenderness: There is no abdominal tenderness.   Musculoskeletal:      Cervical back: Normal range of motion.      Right lower leg: No edema.      Left lower leg: No edema.   Skin:     Findings: Bruising (scattered bruising on LE) and rash (scattered excoriations to RUE) present.   Neurological:      Mental Status: He is alert.      Motor: No weakness.      Comments: Oriented x 2 (person place) said it was either  or    Psychiatric:         Mood and Affect: Mood normal.         Behavior: Behavior normal.           LABORATORY  Lab Results   Component Value Date    SODIUM 137 2022    POTASSIUM 3.7 2022    CHLORIDE 106 2022    CO2 20 2022    GLUCOSE 117 (H) 2022    BUN 12 2022    CREATININE 0.93 2022        Lab Results   Component Value Date    WBC 6.1 2022    HEMOGLOBIN 11.7 (L) 2022    HEMATOCRIT 33.8 (L) 2022    PLATELETCT 254 2022      US-ZOEY SINGLE LEVEL BILAT   Vascular Laboratory   Conclusions   There is no evidence of significant arterial disease.      YESENIA REEVES     Age:    87    Gender:     M     MRN:    [id number]     :    1934      BSA:     Exam Date:     2022 09:09     Room #:     Inpatient     Priority:     Routine     Ht (in):             Wt (lb):     Ordering Physician:        ELADIA DUNHAM     Referring Physician:       [id number] Patel     Sonographer:               Nelson Sandra RVT     Study Type:                Complete Bilateral     Technical Quality:         Good     Indications:     Ulcer of lower extremity     CPT Codes:       27165     ICD Codes:       707.1     History:         ulcer of lower extremity; scabs, excoriations, no prior exam     Limitations:                    RIGHT     Waveform            Systolic BPs (mmHg)                              116           Brachial   Triphasic                                 Common Femoral   Triphasic                                Popliteal   Triphasic                  148           Posterior Tibial   Triphasic                  138           Dorsalis Pedis                                            Digit                              1.28          ZOEY                                            TBI                          LEFT   Waveform        Systolic BPs (mmHg)                              112           Brachial   Triphasic                                Common Femoral   Triphasic                                Popliteal   Triphasic                  146           Posterior Tibial   Triphasic                  131           Dorsalis Pedis                                            Digit                              1.26          ZOEY                                            TBI     Findings   BILATERAL:   Doppler waveforms of the common femoral artery and popliteal artery are    high amplitude and triphasic.    Doppler waveforms at the ankle are brisk and triphasic.    The ankle-brachial indices are normal.    There is no evidence of significant arterial disease demonstrated.     Additional testing was not performed in accordance with lower extremity    arterial evaluation protocol.     Kristian Watkins MD   (Electronically Signed)   Final Date:      30 July 2022                     11:43    CT-HEAD W/O   Final Result           1. No acute intracranial abnormality. No evidence of acute intracranial hemorrhage or mass lesion.       2. Prominent lateral and third ventricles.                   DX-CHEST-PORTABLE (1 VIEW)   Final Result       1.  Cardiomegaly.       2.  Bilateral interstitial infiltrates.         Total time of the discharge process exceeds 35 minutes.

## 2022-07-25 NOTE — PROGRESS NOTES
Assumed care of patient @ 1900.   Assessment completed. POC discussed with pt.   A/O to self only, VSS, room air.   Pt resting in bed with call light and belongings within reach. Bed locked and in lowest position. Bed alarm and strip alarm on.   Needs met at this time.

## 2022-07-25 NOTE — CARE PLAN
The patient is Stable - Low risk of patient condition declining or worsening    Shift Goals  Clinical Goals: neuro checks , safety  Patient Goals: rest  Family Goals: not present    Progress made toward(s) clinical / shift goals:    Problem: Optimal Care of the Stroke Patient  Goal: Optimal emergency care for the stroke patient  Outcome: Progressing     Problem: Psychosocial - Patient Condition  Goal: Patient's ability to verbalize feelings about condition will improve  Outcome: Progressing       Patient is not progressing towards the following goals:

## 2022-07-25 NOTE — PROGRESS NOTES
NOC HOSPITALIST CROSS COVER    Notified by RN regarding patient having increasing agitation. He is alert and oriented to self only. He was attempting to kick and punch the nurses. He is also attempting to pull out lines. His QTc is 466 on EKG from this AM      Vitals:    07/24/22 1956   BP: 134/55   Pulse: 66   Resp: 16   Temp: 37.2 °C (99 °F)   SpO2: 97%          Plan:  #Agitation  -Haldol 2 mg IM Q4 hours  -UA and urine drug screen ordered and pending  -Ammonia ordered and pending        -----------------------------------------------------------------------------------------------------------    Electronically signed by:  ADELA Trejo AGACN-BC  Hospitalist Services

## 2022-07-25 NOTE — CARE PLAN
The patient is Watcher - Medium risk of patient condition declining or worsening    Shift Goals  Clinical Goals: neuro checks, safety  Patient Goals: rest  Family Goals: not present    Progress made toward(s) clinical / shift goals:    Problem: Optimal Care of the Stroke Patient  Goal: Optimal emergency care for the stroke patient  Outcome: Progressing  Goal: Optimal acute care for the stroke patient  Outcome: Progressing     Problem: Neuro Status  Goal: Neuro status will remain stable or improve  Outcome: Progressing     Problem: Hemodynamic Monitoring  Goal: Patient's hemodynamics, fluid balance and neurologic status will be stable or improve  Outcome: Progressing     Problem: Respiratory - Stroke Patient  Goal: Patient will achieve/maintain optimum respiratory rate/effort  Outcome: Progressing     Problem: Dysphagia  Goal: Dysphagia will improve  Outcome: Progressing     Problem: Risk for Aspiration  Goal: Patient's risk for aspiration will be absent or decrease  Outcome: Progressing     Problem: Urinary Elimination  Goal: Establish and maintain regular urinary output  Outcome: Progressing     Problem: Bowel Elimination  Goal: Establish and maintain regular bowel function  Outcome: Progressing     Problem: Skin Integrity  Goal: Skin integrity is maintained or improved  Outcome: Progressing     Problem: Fall Risk  Goal: Patient will remain free from falls  Outcome: Progressing       Patient is not progressing towards the following goals:      Problem: Knowledge Deficit - Stroke Education  Goal: Patient's knowledge of stroke and risk factors will improve  Outcome: Not Progressing     Problem: Psychosocial - Patient Condition  Goal: Patient's ability to verbalize feelings about condition will improve  Outcome: Not Progressing  Goal: Patient's ability to re-evaluate and adapt role responsibilities will improve  Outcome: Not Progressing     Problem: Mobility - Stroke  Goal: Patient's capacity to carry out activities  will improve  Outcome: Not Progressing     Problem: Self Care  Goal: Patient will have the ability to perform ADLs independently or with assistance (bathe, groom, dress, toilet and feed)  Outcome: Not Progressing     Problem: Knowledge Deficit - Standard  Goal: Patient and family/care givers will demonstrate understanding of plan of care, disease process/condition, diagnostic tests and medications  Outcome: Not Progressing

## 2022-07-26 PROBLEM — E55.9 VITAMIN D DEFICIENCY: Status: ACTIVE | Noted: 2022-01-01

## 2022-07-26 PROBLEM — R13.10 DYSPHAGIA: Status: ACTIVE | Noted: 2022-01-01

## 2022-07-26 NOTE — THERAPY
"Physical Therapy   Initial Evaluation     Patient Name: Johann Davidson  Age:  87 y.o., Sex:  male  Medical Record #: [id number]  Today's Date: 7/26/2022     Precautions  Precautions: Fall Risk;Swallow Precautions ( See Comments)  Comments: hx dementia    Assessment  Patient is 87 y.o. male admitted with AMS, dizziness, and ataxia. PMHx of HTN, dementia, baseline A&Ox1. Per chart, son stating pt with hx of ankle fx s/p surgery and has been w/c bound for past few months, receiving PT at his memory care facility. Son prefers pt d/c back to memory care facility and will hire PT for pt on his own, deferring SNF or HH options. Pt required total A for bed mobility, strong R lean in sitting. MaxAx2 for STS via HHA, unable to come to full stand. Pt requiring Max VC/TC to open eyes, very lethargic and falling asleep at times. Given pt at mental baseline, with limited to no learning evidence, will d/c pt from PT. Recommend return to memory care facility if able to provide current functional level of mobility, otherwise, recommend placement. Patient will not be actively followed for physical therapy services at this time, however may be seen if requested by physician for 1 more visit within 30 days to address any discharge or equipment needs.     Plan    Recommend Physical Therapy for Evaluation only     DC Equipment Recommendations: Unable to determine at this time  Discharge Recommendations:  (Return to memory care facility if able to provide current level of assitance. Otherwise recommend placement)       Subjective    \"Would you look at that!\" When this therapist telling pt he is falling over to the R       Objective     07/26/22 0907   Vitals   O2 Delivery Device None - Room Air   Pain 0 - 10 Group   Therapist Pain Assessment During Activity;Nurse Notified  (c/o R knee pain not rated, agreeable to mobility)   Prior Living Situation   Prior Services Continuous (24 Hour) Care Giving Per Service   Housing / Facility Assisted " Living Residence  ([facility])   Comments Obtained via chart as Pt A&Ox1, unable eto provide history.   Prior Level of Functional Mobility   Comments Per chart, son stating pt has been using w/c since recent ankle surgery. Son stating pt at baseline   Cognition    Cognition / Consciousness X   Speech/ Communication Delayed Responses  (inconsistent responses)   Orientation Level   (A&O x1)   Level of Consciousness Responds to voice  (Max cues to open eyes)   Ability To Follow Commands 1 Step  (with repetition)   Safety Awareness Impaired   New Learning Impaired   Attention Impaired   Sequencing Impaired   Initiation Impaired   Comments Pleasant and cooperative with Max VC/TC to participate. Lethargic and confused. Per chart, pt baseline A&Ox1   Passive ROM Lower Body   Passive ROM Lower Body X   Comments B knees lacking 10 deg extension. R ankle unable to reach neutral DF   Active ROM Lower Body    Active ROM Lower Body  WDL   Comments within available range   Strength Lower Body   Lower Body Strength  X   Comments grossly 3+/5 BLE   Sensation Lower Body   Comments denies N/T   Strength Upper Body   Upper Body Strength  X   Gross Strength Generalized Weakness, Equal Bilaterally.    Neurological Concerns   Sitting Posture During ADL's Lateral Lean Right   Standing Posture During ADL's Lateral Lean Right   Coordination Upper Body   Coordination X   Comments grossly impaired with mobility, unable to formally assess given cognition   Coordination Lower Body    Coordination Lower Body  X   Comments grossly impaired with mobility, unable to formally assess given cognition   Vision   Vision Comments Pt requiring Max VC/TC to open eyes   Balance Assessment   Sitting Balance (Static) Poor   Sitting Balance (Dynamic) Poor -   Standing Balance (Static) Trace   Standing Balance (Dynamic) Dependent   Weight Shift Sitting Poor   Weight Shift Standing Absent   Comments HHAx2 for standing   Gait Analysis   Gait Level Of Assist Unable  to Participate   Weight Bearing Status no restrictions   Bed Mobility    Supine to Sit Total Assist   Sit to Supine Total Assist   Scooting Total Assist   Rolling Moderate Assist to Rt.;Maximum Assist to Lt.   Comments strong R lean in sitting   Functional Mobility   Sit to Stand Maximal Assist  (x2 persons)   Bed, Chair, Wheelchair Transfer Unable to Participate   Mobility STS attempt x2   Comments unable to come to complete stand, B flexed knees   How much difficulty does the patient currently have...   Turning over in bed (including adjusting bedclothes, sheets and blankets)? 1   Sitting down on and standing up from a chair with arms (e.g., wheelchair, bedside commode, etc.) 1   Moving from lying on back to sitting on the side of the bed? 1   How much help from another person does the patient currently need...   Moving to and from a bed to a chair (including a wheelchair)? 1   Need to walk in a hospital room? 1   Climbing 3-5 steps with a railing? 1   6 clicks Mobility Score 6   Activity Tolerance   Comments limited 2/2 cognition, weakness, balance   Education Group   Education Provided Role of Physical Therapist   Role of Physical Therapist Patient Response Patient;Acceptance;Explanation;No Learning Evidence   Problem List    Problems Pain;Impaired Bed Mobility;Impaired Transfers;Impaired Ambulation;Functional ROM Deficit;Functional Strength Deficit;Impaired Balance;Impaired Coordination;Impaired Vision;Decreased Activity Tolerance;Safety Awareness Deficits / Cognition;Motor Planning / Sequencing   Anticipated Discharge Equipment and Recommendations   DC Equipment Recommendations Unable to determine at this time   Discharge Recommendations   (Return to [facility] if able to provide current level of assitance. Otherwise recommend placement)   Interdisciplinary Plan of Care Collaboration   IDT Collaboration with  Nursing;Occupational Therapist   Patient Position at End of Therapy In Bed;Bed Alarm On;Tray Table within  Reach;Phone within Reach;Call Light within Reach   Collaboration Comments Rn updated   Session Information   Date / Session Number  7/26: Eval only. D/c needs

## 2022-07-26 NOTE — PROGRESS NOTES
4 Eyes Skin Assessment Completed by Yamini Ang RN andAshly CHACON.    Head WDL              Ears WDL  Nose WDL  Mouth WDL  Neck WDL  Breast/Chest WDL  Shoulder Blades WDL  Spine WDL  (R) Arm/Elbow/Hand id red swollen and has some scratches  (L) Arm/Elbow/Hand is red has a rash and hand has a blister      Abdomen WDL  Groin WDL  Scrotum/Coccyx/Buttocks WDL  (R) Leg WDL,right heel is red and slow to geovany  (L) Leg WDL  (R) Heel/Foot/Toe Ankle  Posterior is black ?DTI  (L) Heel/Foot/Toe WDL          Devices In Places Condom Cath      Interventions In Place Sacral Mepilex, Pillows, Q2 Turns, Low Air Loss Mattress and Barrier Cream    Possible Skin Injury Yes    Pictures Uploaded Into Epic Yes  Wound Consult Placed Yes  RN Wound Prevention Protocol Ordered Yes

## 2022-07-26 NOTE — CARE PLAN
The patient is Stable - Low risk of patient condition declining or worsening    Shift Goals  Clinical Goals: safety, rest  Patient Goals: rest  Family Goals: not present    Progress made toward(s) clinical / shift goals:    Problem: Optimal Care of the Stroke Patient  Goal: Optimal emergency care for the stroke patient  Outcome: Progressing  Goal: Optimal acute care for the stroke patient  Outcome: Progressing     Problem: Psychosocial - Patient Condition  Goal: Patient's ability to verbalize feelings about condition will improve  Outcome: Progressing     Problem: Neuro Status  Goal: Neuro status will remain stable or improve  Outcome: Progressing     Problem: Urinary Elimination  Goal: Establish and maintain regular urinary output  Outcome: Progressing     Problem: Bowel Elimination  Goal: Establish and maintain regular bowel function  Outcome: Progressing     Problem: Skin Integrity  Goal: Skin integrity is maintained or improved  Outcome: Progressing       Patient is not progressing towards the following goals:      Problem: Knowledge Deficit - Stroke Education  Goal: Patient's knowledge of stroke and risk factors will improve  Outcome: Not Progressing     Problem: Psychosocial - Patient Condition  Goal: Patient's ability to re-evaluate and adapt role responsibilities will improve  Outcome: Not Progressing

## 2022-07-26 NOTE — PROGRESS NOTES
Pt becoming restless and attempting to get out of bed. Pt educated to call for staff assist to get out of bed. No evidence of learning. Fall precautions in place. Charge RN aware. Santos MARTINEZ

## 2022-07-26 NOTE — CONSULTS
Reason for Palliative Care Consult: Advance Care Planning (ACP)    Consulted by: BRUCE Arango M.D.    HPI: Johann Davidson is an 87 y.o. male with a past medical history of Lewy Body dementia (diagnosed ), hypertension, BPH, who was transferred from [facility] ([facility]) with dizziness and ataxia (leaning to left).  EKG with RBBB, CXR with cardiomegaly and interstitial infiltrates. CT head with prominent lateral and third ventricles, otherwise no acute abnormalities. Patient admitted for possible stroke and evaluation.  After hospitalist discussion with son Tad, deferring MRI given low suspicion of acute stroke, in combination with patient likely would not tolerate well due to (d/t) claustrophobia.    Information in note obtained from patient's son, Tad Davidson via telephone.    Past medical/surgical history:   Past Medical History:   Diagnosis Date   • Benign prostate hyperplasia    • Dementia (HCC)    • GERD without esophagitis    • Secondary hypertension      Additional consults:     Wound Care    Assessment:  Neuro: Patient alert & oriented to self only (pt's baseline); per chart review, patient has been confused with recent episode of combative agitation 22.  Dyspnea: No-    Last BM: 22-    Pain: Unable to determine-    Depression: Unable to determine-    Dementia: Yes;Lewy Body      Living situation & psychosocial: Patient is retired,  (his wife Tessa Harris  in ), and currently resides at [facility] ([facility]).  2 days prior to admission, patient and his son Tad traveled from [place], [place] to [place] to facilitate patient's placement at [place].  Patient has one living son, Tad Davidson, who lives in [place].  He has one  son named Manpreet, who  of pericarditis in .  He has one 10-year-old granddaughter named Janki.  During his career, patient was a successful  working in [employer].    Spiritual:  Is Buddhism or spirituality important for coping with this illness? Unable to determine-    Has a  or spiritual provider visit been requested? Unable to determine    Palliative Performance Scale: 40%    Advance Directive:  (Son Tad Davdison to e-mail Ekta CHAPMAN copy of existing AD)-    DPOA: No-    POLST: No-      To locate and/or review the AD/POLST, please hover the cursor over the patient's code status, then scroll down under Documents to access ACP document links.    Code Status: Full-      Discussion:  Performed chart review, which indicates patient oriented to self only at baseline, due to dementia.  I called patient's son, Tad Davidson ([phone]). Introduced myself and explained my role in Palliative Care (PC).    Per Tad, Johann derives justin from eating chocolate ice cream and chocolate chip cookies.  \"He loves reading the [place] Times every day, and he loves watching [place] baseball games on television.\"  He also enjoys spending time with his granddaughter, Janki.  Per Tad, patient and Janki \"have a special relationship.\"     From a functional standpoint Tad reports his dad has had a gradual physical decline over the last couple of years, starting with \"a bad fall in 2020 where he hit his head on the corner of a door.\"  Patient also had ankle surgery in Dec. 2021, which went well.  However, during patient's recovery, he suffered a cast abrasion which caused 4 inches of his tendon to be exposed.  Because of this unfortunate event, patient subsequently had to undergo a skin graft surgery in Feb/Mar 2022 by a plastic surgeon at the [facility], to cover the exposed tendon.  Tad feels that because of these two back-to-back surgeries, \"he was laying down in bed for a lot longer than planned and he lost a lot of muscle memory and strength.\"  As a result, patient's mobility has greatly decreased.  He is currently wheelchair-bound, and needs " "assistance with transfers.  \"There are times when he forgets he can't walk and he leans forward like he's going to get up.\"    When queried about his understanding of his dad's current health situation, Tad affirmed patient was admitted with altered mental status, ataxia.  He reports patient has recurrent UTI's, citing an event where patient became \"delirious\" after a UTI in 2020.  \"He needs cranberry pills and juice because the UTI's cause really bad behavior changes.\"  We discussed patient's episode of combative agitation 2 nights ago. Tad requesting hospitalist order urinalysis just to make sure patient does not currently have UTI.  I affirmed I would pass request on to MD via Voalte.     Discussed goals of care (GOC).  Tad wishes for patient to discharge back to [place].  Tad plans to coordinate for patient to receive physical therapy (PT) from an outside PT group to work with patient multiple times a week.  \"He likes doing therapy and enjoys the exercise.  His main therapy goal is to be able to walk and transfer again independently.\"  Tad anticipating that patient will be discharged back to [facility] in the next couple of days.    Discussed Advance Directive (AD) and educated Tad about purpose of form.  Per Tad, patient has existing AD listing Tad as DPOA-HC with no First or Second Alternate.  I advised Tad it would be helpful for [facility] to obtain copy of current AD.  Plan made for this APRN to e-mail Tad, and he will respond with attached copy of AD, which this APRN will then print and scan into EMR.    Discussed code status and educated patient about term.  Described measures employed in a full code including defibrillation, CPR, and intubation.  I advised Tad that patient is currently listed as a Full Code.  Tad said, \"No.  He would not want that.  His directive indicates Do Not Resuscitate.\"  I received Tad' permission to change code status from Full Code to DNR/DNI.      Discussed " POLST form and educated Tad about purpose of form.  Per his request, I e-mailed blank [place] POLST form (.pdf file) to Tad at: [email or web address].  He will complete and send back, along with copy of patient's existing AD.  Once received, this APRN will print and scan documents into EMR.      Provided Tad with Palliative Care contact information, and encouraged him to call with any questions/needs, or if he just needs to talk.  All questions answered.     Provided therapeutic communication including open-ended questions, therapeutic silence, reflective listening, normalization of feelings, and empathic support throughout encounter.      UPDATE:  Received completed POSLT form from ammy Mishra via e-mail.  Printed, then scanned POLST form into EMR.  This APRN also attached digital copy of completed POLST form (with this provider's signature) and emailed back to Tad.    Outcome:  Son Tad Davidson requesting UA d/t patient hx of recurrent UTI's. He will e-mail this APRN patient's existing AD, and will completed [place] POLST form, which I emailed to him.    Plan: Palliative care to continue to follow, provide support, and help facilitate decision making as clinical picture evolves.    Updated: Dr. Lori Shook via Voalte    Thank you for allowing Palliative Care to participate in Johann's care. Please call our team with questions and/or additional needs.    As appropriate, Palliative Care encourages medical team to engage in further conversation, education for patient/family at bedside regarding code status, GOC/POC.    Total visit time was 47 minutes discussing and coordinating advance care planning.     Ekta Davis, DNP, APRN, [facility]  Palliative Care Nurse Practitioner  [phone]

## 2022-07-26 NOTE — THERAPY
"Missed Therapy     Patient Name: Johann Davidson  Age:  87 y.o., Sex:  male  Medical Record #: [id number]  Today's Date: 7/26/2022    SLP attempted to see pt for reassessment of swallow. Pt did open eyes while SLP present. Pt did not follow commands while SLP present. Pt verbalized x3, saying \"shitty\" and \"cool\" in response to SLP saying \"Good morning\" and \"Okay\" when SLP gave her name. Pt not responsive to orientation questions. Pt fell back asleep promptly, pt did not arouse to continual multi-sensory stimulation, including sternal rub, verbal stimuli, ice chip on lips, or sitting bed up.     Bed returned to laying, low position. Will attempt to re-visit later today to reassess swallowing when pt more awake.    Discussed missed therapy with RN. Requested SLP be informed if pt awakens.        07/26/22 0943   Precautions   Precautions Fall Risk;Swallow Precautions ( See Comments)   Short Term Goals   Short Term Goal # 1 Patient will consume prefeeding trials with SLP with no overt s/sx of aspiration.   Interdisciplinary Plan of Care Collaboration   IDT Collaboration with  Nursing   Patient Position at End of Therapy In Bed;Bed Alarm On;Call Light within Reach   Collaboration Comments Req RN inform SLP when pt is awake.     "

## 2022-07-26 NOTE — PROGRESS NOTES
[facility] Medicine Daily Progress Note    Date of Service  7/26/2022    Chief Complaint  Johann Davidson is a 87 y.o. male admitted 7/24/2022 with dizziness.    Hospital Course  Johann Davidson is a 87 y.o. male who presented 7/24/2022 with dizziness, ataxia, leaning to left. Patient with history of dementia alert and oriented to self at baseline, hypertension, who presents from his facility for concern for dizziness, leaning to left, ataxia. Patient seen at bedside, he is only oriented to self. He claims he came from [place] 3 days ago and is still getting settled. He is able to converse with me but does not know how he got here. He states his son Tad lives in [place]. Not able to obtain any more meaningful history from patient as he denies any symptoms. No family contact information in chart. Unsure even which facility patient came from.  In the ED, /107. Hgb 12.4, CBC and CMP otherwise fairly unremarkable. EKG with RBBB, CXR with cardiomegaly and interstitial infiltrates. CT head with prominent lateral and third ventricles otherwise no acute abnormalities. Patient will be admitted for possible stroke and evaluation.    Interval Problem Update  - palliative care consulted  - DNAR/DNI, son worried about UTI, trying to get UA, didn't tolerate straight cath  - vit D low  - agitated overnight    I have discussed this patient's plan of care and discharge plan at IDT rounds today with Case Management, Nursing, Nursing leadership, and other members of the IDT team.      Code Status  DNAR/DNI    Disposition  Patient is medically cleared for discharge.   Anticipate discharge back to memory care facility.  I have placed the appropriate orders for post-discharge needs.    Review of Systems  Review of Systems   Unable to perform ROS: Dementia        Physical Exam  Temp:  [36.3 °C (97.4 °F)-37 °C (98.6 °F)] 36.4 °C (97.5 °F)  Pulse:  [66-74] 72  Resp:  [18-20] 18  BP: (121-156)/() 156/110  SpO2:  [89 %-97 %] 89  %    Physical Exam  Constitutional:       General: He is not in acute distress.     Appearance: He is ill-appearing. He is not toxic-appearing or diaphoretic.      Comments: Eyes closed, does answer questions   HENT:      Head: Normocephalic and atraumatic.      Right Ear: External ear normal.      Left Ear: External ear normal.      Mouth/Throat:      Pharynx: No oropharyngeal exudate or posterior oropharyngeal erythema.   Eyes:      General: No scleral icterus.        Right eye: No discharge.         Left eye: No discharge.      Comments: R eyelid erythematous   Cardiovascular:      Rate and Rhythm: Normal rate and regular rhythm.      Pulses: Normal pulses.      Heart sounds: Normal heart sounds.   Pulmonary:      Effort: Pulmonary effort is normal. No respiratory distress.      Breath sounds: Normal breath sounds. No wheezing.   Abdominal:      General: Bowel sounds are normal. There is no distension.      Palpations: Abdomen is soft.      Tenderness: There is no abdominal tenderness.   Musculoskeletal:         General: No swelling or tenderness.      Cervical back: Normal range of motion and neck supple.   Skin:     Findings: Erythema (R arm) present.   Neurological:      General: No focal deficit present.      Mental Status: He is disoriented.      Comments: Moves all extremities spontaneously   Psychiatric:         Mood and Affect: Mood normal.         Behavior: Behavior normal.         Fluids  No intake or output data in the 24 hours ending 07/26/22 1651    Laboratory  Recent Labs     07/24/22  0956   WBC 7.5   RBC 3.93*   HEMOGLOBIN 12.4*   HEMATOCRIT 37.1*   MCV 94.4   MCH 31.6   MCHC 33.4*   RDW 43.5   PLATELETCT 282   MPV 9.5     Recent Labs     07/24/22  0956   SODIUM 141   POTASSIUM 4.2   CHLORIDE 107   CO2 23   GLUCOSE 72   BUN 18   CREATININE 1.21   CALCIUM 9.0             Recent Labs     07/25/22  0010   TRIGLYCERIDE 61   HDL 41   LDL 61       Imaging  US-CAROTID DOPPLER BILAT   Final Result       CT-HEAD W/O   Final Result         1. No acute intracranial abnormality. No evidence of acute intracranial hemorrhage or mass lesion.      2. Prominent lateral and third ventricles.               DX-CHEST-PORTABLE (1 VIEW)   Final Result      1.  Cardiomegaly.      2.  Bilateral interstitial infiltrates.           Assessment/Plan  * Ataxia- (present on admission)  Assessment & Plan  Reported ataxia and dizziness with leaning to the left from patient's facility  Hx dementia, alert and oriented only to self, appears at baseline mentation  CT head normal, carotid US normal  Lipid panel, A1c, B12, ammonia, TSH all normal.  Discussed with son, who is ok with patient discharging back to memory care facility. Will defer MRI as low suspicion of stroke and patient does not tolerate MRI well in the past. Patient is medically cleared to discharge back to care facility.    Vitamin D deficiency  Assessment & Plan  Vitamin D supplementation initiated    Dysphagia  Assessment & Plan  ST consulted  CXR abnormal  Failed bedside swallow, FEES/MBS    Dementia (HCC)  Assessment & Plan  History of,  Alert and oriented to self at baseline per report, he appears at mental baseline  UA       VTE prophylaxis: SCDs/TEDs    I have performed a physical exam and reviewed and updated ROS and Plan today (7/26/2022). In review of yesterday's note (7/25/2022), there are no changes except as documented above.

## 2022-07-26 NOTE — PROGRESS NOTES
Pt received from CDU.Pt is Alert to self only and confused.Bed is in a low and locked position.Will continue to monitor pt closely and reorient pt to situation.Pt has no IV will inform DR as per report pt has been pulling out his IV's.Call light is within reach

## 2022-07-26 NOTE — THERAPY
"Occupational Therapy   Initial Evaluation     Patient Name: Johann Davidson  Age:  87 y.o., Sex:  male  Medical Record #: [id number]  Today's Date: 7/26/2022     Precautions  Precautions: Fall Risk, Swallow Precautions ( See Comments)  Comments: hx dementia    Assessment    Patient is 87 y.o. male who presented to [facility]/2022 with dizziness, ataxia, leaning to left. PMHx includes dementia and  Hypertension. Pt is alert and oriented only to self at baseline. Per chart, pt resides at [facility]. Pt required max A for bed mobility and had a strong right lean in sitting. Pt needed several reminders to open his eyes and to sit at midline. Pt was not able to fully stand w/ maxA x2 and reported that he was dizzy requesting to lay back down. He was able to identify a hairbrush and was able to properly use it in sitting. As the pt is limited by cognition at baseline and lives at a memory care facility where he receives assist for ADLs as needed, will not continue to follow for acute OT services.     Plan    Recommend Occupational Therapy for Evaluation only.     DC Equipment Recommendations: Unable to determine at this time  Discharge Recommendations: Other - (24/7 supervised care, return to memory care)     Subjective    \"I want to lay down for a week\"      Objective         07/26/22 0919   Initial Contact Note    Initial Contact Note Order Received and Verified, Evaluation Only - Patient Does Not Require Further Acute Occupational Therapy at this Time.  However, May Benefit from Post Acute Therapy for Higher Level Functional Deficits.   Prior Living Situation   Prior Services Continuous (24 Hour) Care Giving Per Service   Housing / Facility Assisted Living Residence  (per chart- [facility])   Comments Pt is A&Ox1 and unable to provide info reguarding Prior living situation or PLOF. Per chart, he resides at [facility]   Prior Level of ADL Function   Self Feeding Unable To Determine At This Time   Grooming / Hygiene " Unable To Determine At This Time   Bathing Unable To Determine At This Time   Dressing Unable To Determine At This Time   Toileting Unable To Determine At This Time   Comments Pt is A&Ox1 and unable to provide info reguarding Prior living situation or PLOF. Per chart, he resides at [facility]   Prior Level of IADL Function   Comments Uable to determine at this time   Precautions   Precautions Fall Risk;Swallow Precautions ( See Comments)   Comments hx dementia   Vitals   O2 Delivery Device None - Room Air   Pain 0 - 10 Group   Location Knee   Location Orientation Right   Therapist Pain Assessment During Activity   Cognition    Cognition / Consciousness X   Speech/ Communication Delayed Responses  (Sentences / words often did not make sense)   Orientation Level Not Oriented to Age;Not Oriented to Year;Not Oriented to Place;Not Oriented to Reason   Level of Consciousness Alert  (very lethargic)   Ability To Follow Commands 1 Step   New Learning Impaired   Attention Impaired   Comments pleasant but very confused.   Neurological Concerns   Neurological Concerns Yes   Sitting Posture During ADL's Lateral Lean Right   Balance Assessment   Sitting Balance (Static) Poor   Sitting Balance (Dynamic) Poor -   Standing Balance (Static) Trace   Standing Balance (Dynamic) Dependent   Weight Shift Sitting Poor   Weight Shift Standing Absent   Comments HHA x2 for standing.   Bed Mobility    Supine to Sit Maximal Assist   Sit to Supine Maximal Assist   Scooting Maximal Assist   Rolling Moderate Assist to Lt.;Moderate Assist to Rt.   Comments strong lean right in sitting   ADL Assessment   Grooming Seated;Minimal Assist   Upper Body Dressing Maximal Assist   Lower Body Dressing Maximal Assist   Toileting Maximal Assist   Comments incontinence   How much help from another person does the patient currently need...   Putting on and taking off regular lower body clothing? 1   Bathing (including washing, rinsing, and drying)? 2    Toileting, which includes using a toilet, bedpan, or urinal? 2   Putting on and taking off regular upper body clothing? 2   Taking care of personal grooming such as brushing teeth? 2   Eating meals? 2   6 Clicks Daily Activity Score 11   Functional Mobility   Sit to Stand Maximal Assist  (x2 assist. not able to fully stand)   Comments only to EOB stand for >5 sec.Not able to stand stright up.   Activity Tolerance   Sitting Edge of Bed 9 min   Standing 1 min total   Education Group   Education Provided Activities of Daily Living;Role of Occupational Therapist   Role of Occupational Therapist Patient Response Patient;Acceptance;Explanation;No Learning Evidence   ADL Patient Response Patient;Acceptance;Explanation;No Learning Evidence   Anticipated Discharge Equipment and Recommendations   DC Equipment Recommendations Unable to determine at this time   Discharge Recommendations Other -  (24/7 supervised care)   Interdisciplinary Plan of Care Collaboration   IDT Collaboration with  Nursing   Patient Position at End of Therapy In Bed;Bed Alarm On;Call Light within Reach;Tray Table within Reach   Collaboration Comments Rn updated   Session Information   Date / Session Number  7/26, 1 (eval only)   Priority 0

## 2022-07-27 PROBLEM — R33.9 URINARY RETENTION: Status: ACTIVE | Noted: 2022-01-01

## 2022-07-27 PROBLEM — N30.00 ACUTE CYSTITIS WITHOUT HEMATURIA: Status: ACTIVE | Noted: 2022-01-01

## 2022-07-27 NOTE — PROGRESS NOTES
NOC HOSPITALIST CROSS COVER    Forwarded lab results regarding the patient's urinalysis results. He has small amount of occult blood, large leuk esterase, 100-150 WBC, moderate bacteria, and positive hyaline casts.       Vitals:    07/26/22 2327   BP: 130/74   Pulse: 81   Resp: 18   Temp: 36.7 °C (98.1 °F)   SpO2: 91%       07/27/22 02:58   Color Yellow   Character Cloudy !   Specific Gravity 1.015   Ph 6.5   Glucose Negative   Ketones 15 !   Bilirubin Negative   Occult Blood Small !   Protein Negative   Nitrite Negative   Leukocyte Esterase Large !   Urobilinogen, Urine 1.0   Micro Urine Req Microscopic   -150 !   RBC 2-5 !   Epithelial Cells Negative   Bacteria Moderate !   Hyaline Cast 6-10 !   !: Data is abnormal    Plan:  #Urinary tract infection  -Rocephin 2 g IV Q24 hours        -----------------------------------------------------------------------------------------------------------    Electronically signed by:  ADELA Trejo, [facility]  Hospitalist Services

## 2022-07-27 NOTE — CARE PLAN
aThe patient is Stable - Low risk of patient condition declining or worsening    Shift Goals  Clinical Goals: Pt safety, prevent falls, collect UA  Patient Goals: MAMI  Family Goals: MAMI    Progress made toward(s) clinical / shift goals:      Problem: Skin Integrity  Goal: Skin integrity is maintained or improved  Outcome: Progressing   Pt turned q2hrs, heel float boats in use  Problem: Fall Risk  Goal: Patient will remain free from falls  Outcome: Progressing   Bed alarm on, fall precautions in place    Patient is not progressing towards the following goals:      Problem: Urinary Elimination  Goal: Establish and maintain regular urinary output  Outcome: Not Progressing   Pt has not voided, bladder scan showed 800ml of urine. Orders for straight cath obtained

## 2022-07-27 NOTE — PROGRESS NOTES
No void yet on shift, bladder scan performed and showed 800ml of urine. On call APRN notified and obtained order for straight catheter.

## 2022-07-27 NOTE — THERAPY
"Speech Language Pathology  Daily Treatment     Patient Name: Johann Davidson  Age:  87 y.o., Sex:  male  Medical Record #: [id number]  Today's Date: 7/27/2022     Precautions  Precautions: Fall Risk, Swallow Precautions ( See Comments)  Comments: hx of dementia    Assessment    Attempted to see pt on this date for a FEES. Despite max encouragement and education, pt declined study multiple times but agreed to PO trials. He consumed trials of apple sauce, pudding, soft solids/mixed consistencies, solids, and thin liquids with no overt s/sx of aspiration. Mastication and swallow trigger timely and vocal quality clear following the swallow. Pt able to feed self, however, impaired coordination as noted by loss of bolus from spoon. Pt will benefit from assistance with feeding as needed with increased difficulty. At this time, recommend pt begin a regular/thin liquid diet with high follow up on Thursday. MD and RN update on session and recommendations. SLP following.     Plan    1) recommend pt begin a regular/thin liquid diet with high follow up on Thursday.  2) please discontinue PO with any s/sx of aspiration and please hold PO with lethargy    Continue current treatment plan.    Discharge Recommendations: Other       Objective       07/27/22 1340   Precautions   Precautions Fall Risk;Swallow Precautions ( See Comments)   Comments hx of dementia   Vitals   O2 (LPM) 0   O2 Delivery Device None - Room Air   Pain 0 - 10 Group   Therapist Pain Assessment Post Activity Pain Same as Prior to Activity;Nurse Notified;0   Patient / Family Goals   Patient / Family Goal #1 \"More ice\"   Goal #1 Outcome Progressing as expected   Short Term Goals   Short Term Goal # 1 Patient will consume prefeeding trials with SLP with no overt s/sx of aspiration.   Goal Outcome # 1 Goal met   Short Term Goal # 2 Pt will complete instrumental swallowing exam with SLP to determine appropriate diet recommendations.   Goal Outcome # 2  Goal not met   Short " Term Goal # 3 NEW   Education Group   Education Provided Dysphagia;Role of Speech Therapy   Dysphagia Patient Response Patient;Acceptance;Explanation;Verbal Demonstration;Reinforcement Needed   Role of SLP Patient Response Patient;Acceptance;Explanation;Verbal Demonstration;Reinforcement Needed   Anticipated Discharge Needs   Discharge Recommendations Other   Therapy Recommendations Upon DC Dysphagia Training;Community Re-Integration;Patient / Family / Caregiver Education   Interdisciplinary Plan of Care Collaboration   IDT Collaboration with  Nursing   Patient Position at End of Therapy Seated;In Bed;Call Light within Reach;Tray Table within Reach

## 2022-07-27 NOTE — PROGRESS NOTES
Attempted straight cath again with coude catheter but was unable to advance and get urine.     Edit: 0230 Was able to straight cath with help of ICU nurse after another attempt with coude catheter.

## 2022-07-27 NOTE — ASSESSMENT & PLAN NOTE
UA c/f infection  Ucx grew Pseudomonas and Enterococcus  Likely in setting of urinary retention  On CTX-->zosyn-->gentamicin x 1, amoxicillin to complete 7 day course   Has allergy to cipro, will talk to facility regarding severity

## 2022-07-27 NOTE — ASSESSMENT & PLAN NOTE
Patient with tight phimosis, nursing staff unable to cath  Consulted urology: placed krueger 7/27  Started flomax

## 2022-07-27 NOTE — PROGRESS NOTES
[facility] Medicine Daily Progress Note    Date of Service  7/27/2022    Chief Complaint  Johann Davidson is a 87 y.o. male admitted 7/24/2022 with dizziness.    Hospital Course  Johann Davidson is a 87 y.o. male who presented 7/24/2022 with dizziness, ataxia, leaning to left. Patient with history of dementia alert and oriented to self at baseline, hypertension, who presents from his facility for concern for dizziness, leaning to left, ataxia. Patient seen at bedside, he is only oriented to self. He claims he came from [place] 3 days ago and is still getting settled. He is able to converse with me but does not know how he got here. He states his son Tad lives in [place]. Not able to obtain any more meaningful history from patient as he denies any symptoms. No family contact information in chart. Unsure even which facility patient came from.  In the ED, /107. Hgb 12.4, CBC and CMP otherwise fairly unremarkable. EKG with RBBB, CXR with cardiomegaly and interstitial infiltrates. CT head with prominent lateral and third ventricles otherwise no acute abnormalities. Patient will be admitted for possible stroke and evaluation.    Interval Problem Update  - urinary retention overnight, s/p I/O cath, again developed urinary retention, unable to perform cath, consulted urology, started flomax  - UA c/f infection, started CTX  - passed swallow eval today with ST      I have discussed this patient's plan of care and discharge plan at IDT rounds today with Case Management, Nursing, Nursing leadership, and other members of the IDT team.      Code Status  DNAR/DNI    Disposition  Patient is medically cleared for discharge.   Anticipate discharge back to memory care facility.  I have placed the appropriate orders for post-discharge needs.    Review of Systems  Review of Systems   Unable to perform ROS: Dementia        Physical Exam  Temp:  [36.4 °C (97.5 °F)-37.1 °C (98.8 °F)] 36.5 °C (97.7 °F)  Pulse:  [56-81] 56  Resp:  [16-18]  16  BP: (130-159)/() 138/70  SpO2:  [89 %-98 %] 96 %    Physical Exam  Constitutional:       General: He is not in acute distress.     Appearance: He is ill-appearing. He is not toxic-appearing or diaphoretic.      Comments: Eyes closed, mumbling, not answering questions this am   HENT:      Head: Normocephalic and atraumatic.      Right Ear: External ear normal.      Left Ear: External ear normal.      Mouth/Throat:      Pharynx: No oropharyngeal exudate or posterior oropharyngeal erythema.   Eyes:      General: No scleral icterus.        Right eye: No discharge.         Left eye: No discharge.      Comments: R eyelid erythematous   Cardiovascular:      Rate and Rhythm: Normal rate and regular rhythm.      Pulses: Normal pulses.      Heart sounds: Normal heart sounds.   Pulmonary:      Effort: Pulmonary effort is normal. No respiratory distress.      Breath sounds: Normal breath sounds. No wheezing.   Abdominal:      General: Bowel sounds are normal. There is no distension.      Palpations: Abdomen is soft.      Tenderness: There is no abdominal tenderness.   Musculoskeletal:         General: No swelling or tenderness.      Cervical back: Normal range of motion and neck supple.   Skin:     Findings: Erythema (R arm) present.   Neurological:      General: No focal deficit present.      Mental Status: He is disoriented.      Comments: Moves all extremities spontaneously   Psychiatric:         Mood and Affect: Mood normal.         Behavior: Behavior normal.         Fluids    Intake/Output Summary (Last 24 hours) at 7/27/2022 1452  Last data filed at 7/27/2022 0600  Gross per 24 hour   Intake 1500 ml   Output 802 ml   Net 698 ml       Laboratory  Recent Labs     07/27/22  1349   WBC 6.1   RBC 3.62*   HEMOGLOBIN 11.7*   HEMATOCRIT 33.8*   MCV 93.4   MCH 32.3   MCHC 34.6   RDW 42.8   PLATELETCT 254   MPV 9.7     Recent Labs     07/27/22  1349   SODIUM 137   POTASSIUM 3.7   CHLORIDE 106   CO2 20   GLUCOSE 117*   BUN  12   CREATININE 0.93   CALCIUM 8.4*             Recent Labs     07/25/22  0010   TRIGLYCERIDE 61   HDL 41   LDL 61       Imaging  US-CAROTID DOPPLER BILAT   Final Result      CT-HEAD W/O   Final Result         1. No acute intracranial abnormality. No evidence of acute intracranial hemorrhage or mass lesion.      2. Prominent lateral and third ventricles.               DX-CHEST-PORTABLE (1 VIEW)   Final Result      1.  Cardiomegaly.      2.  Bilateral interstitial infiltrates.           Assessment/Plan  * Ataxia- (present on admission)  Assessment & Plan  Reported ataxia and dizziness with leaning to the left from patient's facility  Hx dementia, alert and oriented only to self, appears at baseline mentation  CT head normal, carotid US normal  Lipid panel, A1c, B12, ammonia, TSH all normal.  Discussed with son, who is ok with patient discharging back to memory care facility. Will defer MRI as low suspicion of stroke and patient does not tolerate MRI well in the past. Patient is medically cleared to discharge back to care facility.    Urinary retention  Assessment & Plan  S/p I/O cath overnight, unable to perform today  Consulted urology  Started flomax    Acute cystitis without hematuria  Assessment & Plan  UA c/f infection  Ucx pending  Likely in setting of urinary retention    Vitamin D deficiency  Assessment & Plan  Vitamin D supplementation initiated    Dysphagia  Assessment & Plan  Improving  ST consulted  CXR abnormal      Dementia (HCC)  Assessment & Plan  History of,  More confused today  Interventions to minimize the risk of delirium.   -do not disturb patient (vitals or lab draws) between the hours of 10 PM and 6 AM.  -frequent reorientation  -avoid sedatives  -up in chair for meals  -watch for constipation  -remove all unnecessary lines           VTE prophylaxis: SCDs/TEDs    I have performed a physical exam and reviewed and updated ROS and Plan today (7/27/2022). In review of yesterday's note (7/26/2022),  there are no changes except as documented above.

## 2022-07-27 NOTE — PROGRESS NOTES
Attempted straight cath x2 with 16fr straight tip catheter and 14fr coude catheter. Pt became very agitated during procedure and required x3 nurses to hold him down. Pt attempting to bite and punch at nurses. Was unable to advance catheter far enough to obtain urine. Procedure aborted and haldol given to patient for agitation.

## 2022-07-27 NOTE — PROGRESS NOTES
Orders to straight cath pt.   Attempted 2x times to straight cath.  Rapid nurse came to bedside and was not able to insert catheter.   MD notified.

## 2022-07-27 NOTE — CARE PLAN
The patient is Watcher - Medium risk of patient condition declining or worsening    Shift Goals  Clinical Goals: pt safety, insert krueger  Patient Goals: eat  Family Goals: MAMI    Progress made toward(s) clinical / shift goals:      Problem: Fall Risk  Goal: Patient will remain free from falls  Outcome: Progressing  Note: Fall precautions in place, bed in lowest position, call light and belongings in reach.   Tele sitter at bedside.     Problem: Skin Integrity  Goal: Skin integrity is maintained or improved  Outcome: Progressing  Note: Q2 turns in place to ensure skin integrity is maintained.      Patient is not progressing towards the following goals:    Problem: Urinary Elimination  Goal: Establish and maintain regular urinary output  Outcome: Not Progressing  Note: Pt unable to void at this time, bladder scan showing 548 mls.   3x attempts to insert krueger. MD notified.      Problem: Knowledge Deficit - Standard  Goal: Patient and family/care givers will demonstrate understanding of plan of care, disease process/condition, diagnostic tests and medications  Outcome: Not Progressing  Note: Pt A/Ox1, self, unable to understand plan of care.

## 2022-07-27 NOTE — CONSULTS
UROLOGY Consult Note:    Lisa Saeed P.A.-C.  Date & Time note created:    7/27/2022   3:00 PM     Referring MD:  Dr. Shook    Patient ID:   Name:             Johann Davidson     YOB: 1934  Age:                 87 y.o.  male   MRN:               [id number]                                                             Reason for Consult:      Retention, difficult krueger catheter placement    History of Present Illness:    Pleasant 87 y.o. male who presented 7/24/2022 with dizziness, ataxia, leaning to left. Patient with history of dementia alert and oriented to self at baseline.     Urology consulted for difficult krueger placement. RN reports successful straight cath attempts in days prior, however has been unable to place krueger today. PVRs have been elevated >500cc. Patient has been started on Flomax.    He does state that he is currently in no pain. Very cooperative during consult.     On exam, patient is in no acute distress. Able to lay flat for bedside krueger catheter placement. Tight phimosis appreciated on  exam.     Review of Systems:     Constitutional: Denies fevers  Cardiovascular: no chest pain, no palpitations   Respiratory: no shortness of breath , Denies cough  Gastrointestinal/Hepatic: Denies abdominal pain, nausea, vomiting  Genitourinary: Denies hematuria  Musculoskeletal/Rheum: Denies  joint pain and swelling  Skin: Denies rash    Past Medical History:   Past Medical History:   Diagnosis Date   • Benign prostate hyperplasia    • Dementia (HCC)    • GERD without esophagitis    • Secondary hypertension      Active Hospital Problems    Diagnosis    • Acute cystitis without hematuria [N30.00]    • Urinary retention [R33.9]    • Dysphagia [R13.10]    • Vitamin D deficiency [E55.9]    • Ataxia [R27.0]    • Dementia (HCC) [F03.90]        Past Surgical History:  History reviewed. No pertinent surgical history.    Hospital Medications:    Current Facility-Administered Medications:   •   cefTRIAXone (Rocephin) syringe 2 g, 2 g, Intravenous, Q24HRS, Denise Richter, A.P.R.N., 2 g at 07/27/22 0404  •  tamsulosin (FLOMAX) capsule 0.4 mg, 0.4 mg, Oral, AFTER BREAKFAST, Lori Shook M.D., 0.4 mg at 07/27/22 1102  •  dextrose 5 % and 0.9 % NaCl with KCl 20 mEq infusion, , Intravenous, Continuous, Lori Shook M.D., Last Rate: 125 mL/hr at 07/27/22 1420, New Bag at 07/27/22 1420  •  melatonin tablet 5 mg, 5 mg, Oral, Nightly, Lori Shook M.D., 5 mg at 07/26/22 2058  •  vitamin D2 (Ergocalciferol) (Drisdol) capsule 50,000 Units, 50,000 Units, Oral, Q7 DAYS, Nba Monroy M.D.  •  aspirin (ASA) chewable tab 81 mg, 81 mg, Oral, DAILY, Nba Monroy M.D., 81 mg at 07/27/22 0609  •  atorvastatin (LIPITOR) tablet 10 mg, 10 mg, Oral, Nightly, Nba Monroy M.D., 10 mg at 07/26/22 2058  •  omeprazole (PRILOSEC) capsule 20 mg, 20 mg, Oral, DAILY, Nba Monroy M.D., 20 mg at 07/27/22 0609  •  QUEtiapine (Seroquel) tablet 25 mg, 25 mg, Oral, QHS, Nba Monroy M.D., 25 mg at 07/26/22 2058  •  haloperidol lactate (HALDOL) injection 2 mg, 2 mg, Intramuscular, Q4HRS PRN, Nini Mckeon, A.P.R.N., 2 mg at 07/27/22 0048  •  senna-docusate (PERICOLACE or SENOKOT S) 8.6-50 MG per tablet 2 Tablet, 2 Tablet, Oral, BID, 2 Tablet at 07/27/22 0609 **AND** polyethylene glycol/lytes (MIRALAX) PACKET 1 Packet, 1 Packet, Oral, QDAY PRN **AND** magnesium hydroxide (MILK OF MAGNESIA) suspension 30 mL, 30 mL, Oral, QDAY PRN **AND** bisacodyl (DULCOLAX) suppository 10 mg, 10 mg, Rectal, QDAY PRN, Nba Monroy M.D.  •  acetaminophen (Tylenol) tablet 650 mg, 650 mg, Oral, Q6HRS PRN, Nba Monroy M.D.  •  ondansetron (ZOFRAN) syringe/vial injection 4 mg, 4 mg, Intravenous, Q4HRS PRN, Nba Monroy M.D.  •  ondansetron (ZOFRAN ODT) dispertab 4 mg, 4 mg, Oral, Q4HRS PRN, Nba Monroy M.D.    Current Outpatient Medications:  Medications Prior to Admission   Medication Sig Dispense Refill Last Dose   • aspirin (ASA) 81  MG Chew Tab chewable tablet Chew 81 mg every day. Pt refused on    Has not taken   • atorvastatin (LIPITOR) 10 MG Tab Take 10 mg by mouth every evening.   2022 at 1700   • bacitracin 500 UNIT/GM ointment Apply 1 Each topically every day. Per MAR, apply affected areas  Pt refused on 2022   Has not taken   • omeprazole (PRILOSEC) 20 MG delayed-release capsule Take 20 mg by mouth every day. Pt refused on 2022   Has not taken   • QUEtiapine (SEROQUEL) 25 MG Tab Take 25 mg by mouth at bedtime. Per MAR pt refused on 2022   Has not taken   • vitamin B-12 CR (CYANOCOBALAMIN) 1000 MCG Tab CR tablet Take 1,000 mcg by mouth every day. Per MAR pt refused on 2022   Has not taken   • Vitamins A & D (VITAMIN A & D) Ointment Apply 1 Application topically every day. Per MAR apply's on right ankle, pt refused on 2022   Unknown at Unknown       Medication Allergy:  Allergies   Allergen Reactions   • Ciprofloxacin      Per MAR from [place]    • Lidocaine      Per MAR from [place]    • Tetanus Toxoid      Per MAR from [place]        Family History:  History reviewed. No pertinent family history.    Social History:  Social History     Socioeconomic History   • Marital status:      Spouse name: Not on file   • Number of children: Not on file   • Years of education: Not on file   • Highest education level: Not on file   Occupational History   • Not on file   Tobacco Use   • Smoking status: Former Smoker     Types: Cigarettes     Quit date: 1985     Years since quittin.0   • Smokeless tobacco: Never Used   Vaping Use   • Vaping Use: Never used   Substance and Sexual Activity   • Alcohol use: Not Currently   • Drug use: Never   • Sexual activity: Not on file   Other Topics Concern   • Not on file   Social History Narrative   • Not on file     Social Determinants of Health     Financial Resource Strain: Not on file   Food Insecurity: Not on file   Transportation Needs: Not on file  "  Physical Activity: Not on file   Stress: Not on file   Social Connections: Not on file   Intimate Partner Violence: Not on file   Housing Stability: Not on file         Physical Exam:  Vitals/ General Appearance:   Weight/BMI: Body mass index is 25.74 kg/m².  /70   Pulse (!) 56   Temp 36.5 °C (97.7 °F) (Temporal)   Resp 16   Ht 1.803 m (5' 11\")   Wt 83.7 kg (184 lb 8.4 oz)   SpO2 96%   Vitals:    07/26/22 2327 07/27/22 0345 07/27/22 0747 07/27/22 1146   BP: 130/74 131/73 130/71 138/70   Pulse: 81 60 (!) 57 (!) 56   Resp: 18 18 16 16   Temp: 36.7 °C (98.1 °F) 36.8 °C (98.2 °F) 36.8 °C (98.2 °F) 36.5 °C (97.7 °F)   TempSrc: Temporal Temporal Temporal Temporal   SpO2: 91% 91% 96% 96%   Weight:       Height:         Oxygen Therapy:  Pulse Oximetry: 96 %, O2 (LPM): 0, O2 Delivery Device: None - Room Air    Constitutional: No acute distress  HENMT:  Normocephalic, Atraumatic  Eyes:  EOMI, Conjunctiva normal, No discharge.  Neck:  Normal range of motion  Lungs:  Normal respiratory effort  Abdomen: Soft, No tenderness  : Phimosis. Urethra unable to be visualized.  Skin: Warm, Dry, No erythema, No rash, no induration.  Neurologic: Alert, disoriented  Psychiatric: Affect normal      MDM (Data Review):     Records reviewed and summarized in current documentation    Lab Data Review:  Recent Results (from the past 24 hour(s))   URINE DRUG SCREEN (TRIAGE)    Collection Time: 07/27/22  2:58 AM   Result Value Ref Range    Amphetamines Urine Negative Negative    Barbiturates Negative Negative    Benzodiazepines Negative Negative    Cocaine Metabolite Negative Negative    Methadone Negative Negative    Opiates Negative Negative    Oxycodone Negative Negative    Phencyclidine -Pcp Negative Negative    Propoxyphene Negative Negative    Cannabinoid Metab Negative Negative   URINALYSIS    Collection Time: 07/27/22  2:58 AM    Specimen: Urine   Result Value Ref Range    Color Yellow     Character Cloudy (A)     Specific " Gravity 1.015 <1.035    Ph 6.5 5.0 - 8.0    Glucose Negative Negative mg/dL    Ketones 15 (A) Negative mg/dL    Protein Negative Negative mg/dL    Bilirubin Negative Negative    Urobilinogen, Urine 1.0 Negative    Nitrite Negative Negative    Leukocyte Esterase Large (A) Negative    Occult Blood Small (A) Negative    Micro Urine Req Microscopic    URINE MICROSCOPIC (W/UA)    Collection Time: 07/27/22  2:58 AM   Result Value Ref Range    -150 (A) /hpf    RBC 2-5 (A) /hpf    Bacteria Moderate (A) None /hpf    Epithelial Cells Negative /hpf    Hyaline Cast 6-10 (A) /lpf   CBC WITHOUT DIFFERENTIAL    Collection Time: 07/27/22  1:49 PM   Result Value Ref Range    WBC 6.1 4.8 - 10.8 K/uL    RBC 3.62 (L) 4.70 - 6.10 M/uL    Hemoglobin 11.7 (L) 14.0 - 18.0 g/dL    Hematocrit 33.8 (L) 42.0 - 52.0 %    MCV 93.4 81.4 - 97.8 fL    MCH 32.3 27.0 - 33.0 pg    MCHC 34.6 33.7 - 35.3 g/dL    RDW 42.8 35.9 - 50.0 fL    Platelet Count 254 164 - 446 K/uL    MPV 9.7 9.0 - 12.9 fL   Basic Metabolic Panel    Collection Time: 07/27/22  1:49 PM   Result Value Ref Range    Sodium 137 135 - 145 mmol/L    Potassium 3.7 3.6 - 5.5 mmol/L    Chloride 106 96 - 112 mmol/L    Co2 20 20 - 33 mmol/L    Glucose 117 (H) 65 - 99 mg/dL    Bun 12 8 - 22 mg/dL    Creatinine 0.93 0.50 - 1.40 mg/dL    Calcium 8.4 (L) 8.5 - 10.5 mg/dL    Anion Gap 11.0 7.0 - 16.0   ESTIMATED GFR    Collection Time: 07/27/22  1:49 PM   Result Value Ref Range    GFR (CKD-EPI) 79 >60 mL/min/1.73 m 2       Imaging/Procedures Review:    Reviewed    MDM (Assessment and Plan):     Active Hospital Problems    Diagnosis    • Acute cystitis without hematuria [N30.00]    • Urinary retention [R33.9]    • Dysphagia [R13.10]    • Vitamin D deficiency [E55.9]    • Ataxia [R27.0]    • Dementia (HCC) [F03.90]      87yoM with difficult krueger catheterization complicated by tight phimosis.     UA this morning with moderate bacteria, small blood, large leuks    Patient on  Rocephin    Utilizing sterile technique, I was able to place an 18fr 2-way catheter (balloon filled with 10cc) without incident. Return of copious clear yellow urine, now draining into krueger bag to gravity. RN and CNA assisted. Patient tolerated procedure well.     PLAN:    - 18fr 2-way krueger catheter securely in place with stat lock. Please note balloon is filled with 10cc.  - Recommend continuing Flomax and antibiotics per hospital team  - Recommend obtaining urine culture, treat per results  - Appreciate this consult, urology will sign off    Dr. Gordon is aware of this consult and has directed the plan of care.    Lisa Saeed PA-C  [facility] [place]

## 2022-07-28 NOTE — CARE PLAN
The patient is Stable - Low risk of patient condition declining or worsening    Shift Goals  Clinical Goals: Safety  Patient Goals: MAMI  Family Goals: MAMI    Progress made toward(s) clinical / shift goals:      Problem: Optimal Care of the Stroke Patient  Goal: Optimal emergency care for the stroke patient  Outcome: Progressing     Problem: Hemodynamic Monitoring  Goal: Patient's hemodynamics, fluid balance and neurologic status will be stable or improve  Outcome: Progressing     Problem: Respiratory - Stroke Patient  Goal: Patient will achieve/maintain optimum respiratory rate/effort  Outcome: Progressing     Problem: Dysphagia  Goal: Dysphagia will improve  Outcome: Met     Patient is not progressing towards the following goals:

## 2022-07-28 NOTE — WOUND TEAM
[facility]  Inpatient Services  Initial Wound and Skin Care Evaluation    Admission Date: 2022     Last order of IP CONSULT TO WOUND CARE was found on 2022 from Hospital Encounter on 2022     HPI, PMH, SH: Reviewed    History reviewed. No pertinent surgical history.  Social History     Tobacco Use   • Smoking status: Former Smoker     Types: Cigarettes     Quit date: 1985     Years since quittin.0   • Smokeless tobacco: Never Used   Substance Use Topics   • Alcohol use: Not Currently     Chief Complaint   Patient presents with   • ALOC     Pt arrives via EMS with c/o altered mental status. Pt has history of dementia and is baseline axox1. Pt is the same at this time. EMS reports facility c/o pt not acting normally and leaning to left. EMS states pt was not leaning when they arrived. Pt does state that he is dizzy, but reports that he is dizzy often.  per EMS     Diagnosis: Ataxia [R27.0]    Unit where seen by Wound Team: S198/     WOUND CONSULT/FOLLOW UP RELATED TO:  L heel, BUE     WOUND HISTORY: Per HPI: Johann Davidson is a 87 y.o. male who presented 2022 with dizziness, ataxia, leaning to left. Patient with history of dementia alert and oriented to self at baseline, hypertension, who presents from his facility for concern for dizziness, leaning to left, ataxia. Patient seen at bedside, he is only oriented to self. He claims he came from [place] 3 days ago and is still getting settled. He is able to converse with me but does not know how he got here. He states his son Tad lives in [place]. Not able to obtain any more meaningful history from patient as he denies any symptoms. No family contact information in chart. Unsure even which facility patient came from.    WOUND ASSESSMENT/LDA      Wound 22 Pressure Injury L achilles POA (Active)   Wound Image      Site Assessment Purple    Periwound Assessment Dry    Margins Attached edges    Closure None    Drainage  Amount Scant    Drainage Description Sanguineous    Treatments Site care    Wound Cleansing Normal Saline Irrigation    Periwound Protectant Not Applicable    Dressing Cleansing/Solutions Not Applicable    Dressing Options Mepilex    Dressing Changed Observed    Dressing Status Intact    Dressing Change/Treatment Frequency Every 72 hrs, and As Needed    NEXT Dressing Change/Treatment Date 07/31/22    NEXT Weekly Photo (Inpatient Only) 08/04/22    Pressure Injury Stage DTPI    Wound Length (cm) 1 cm    Wound Width (cm) 1.5 cm    Wound Depth (cm) 0 cm    Wound Surface Area (cm^2) 1.5 cm^2    Wound Volume (cm^3) 0 cm^3    Wound Odor None    Exposed Structures None    Number of days: 0        Vascular:    ZOEY:   No results found.    Lab Values:    Lab Results   Component Value Date/Time    WBC 6.1 07/27/2022 01:49 PM    RBC 3.62 (L) 07/27/2022 01:49 PM    HEMOGLOBIN 11.7 (L) 07/27/2022 01:49 PM    HEMATOCRIT 33.8 (L) 07/27/2022 01:49 PM    HBA1C 5.2 07/24/2022 09:56 AM        Culture Results show:  No results found for this or any previous visit (from the past 720 hour(s)).    Pain Level/Medicated:  none       INTERVENTIONS BY WOUND TEAM:  Chart and images reviewed. Discussed with bedside RN. All areas of concern (based on picture review, LDA review and discussion with bedside RN) have been thoroughly assessed. Documentation of areas based on significant findings. This RN in to assess patient. Performed standard wound care which includes appropriate positioning, dressing removal and non-selective debridement. Pictures and measurements obtained weekly if/when required.  Preparation for Dressing removal: NA  Non-selectively Debrided with:  NS and gauze.  Sharp debridement: NA  Jeanette wound: Cleansed with NS  Primary Dressing: mepilex  Secondary (Outer) Dressing: moon boot    Interdisciplinary consultation: Patient, Bedside RN     EVALUATION / RATIONALE FOR TREATMENT:  Most Recent Date:  7/28: Small, purple injury to to R  achilles, deemed to be DTI. Due to atypical presentation of injury and potential for evolution, will order ABIs to obtain baseline blood flow measurement. Wound team singing off . Please reconsult if wound worsens or fails to progress.    Superficial dry, abrasions to BUE can be left KARRI.     Goals: Steady decrease in wound area and depth weekly.    WOUND TEAM PLAN OF CARE ([X] for frequency of wound follow up,):   Nursing to follow dressing orders written for wound care. Contact wound team if area fails to progress, deteriorates or with any questions/concerns if something comes up before next scheduled follow up (See below as to whether wound is following and frequency of wound follow up)  Dressing changes by wound team:                   Follow up 3 times weekly:                NPWT change 3 times weekly:     Follow up 1-2 times weekly:      Follow up Bi-Monthly:                   Follow up as needed:   X nursing to manage  Other (explain):     NURSING PLAN OF CARE ORDERS (X):  Dressing changes: See Dressing Care orders: x  Skin care: See Skin Care orders: x  RN Prevention Protocol: x  Rectal tube care: See Rectal Tube Care orders:   Other orders:    RSKIN:   CURRENTLY IN PLACE (X), APPLIED THIS VISIT (A), ORDERED (O):   Q shift Jose:  X  Q shift pressure point assessments:  X    Surface/Positioning   Pressure redistribution mattress    x        Low Airloss          Bariatric foam      Bariatric SHERRY     Waffle cushion        Waffle Overlay      x  Reposition q 2 hours    O  TAPs Turning system     Z Abdirashid Pillow O  Offloading/Redistribution   Sacral Mepilex (Silicone dressing)   x  Heel Mepilex (Silicone dressing)    x     Heel float boots (Prevalon boot)     x        Float Heels off Bed with Pillows           Respiratory   Silicone O2 tubing         Gray Foam Ear protectors   O  Cannula fixation Device (Tender )          High flow offloading Clip    Elastic head band offloading device      Anchorfast                                                          Trach with Optifoam split foam             Containment/Moisture Prevention     Rectal tube or BMS    Purwick/Condom Cath        Redd Catheter  x  Barrier wipes           Barrier paste       Antifungal tx      Interdry        Mobilization       Up to chair        Ambulate      PT/OT      Nutrition       Dietician        Diabetes Education      PO     TF  x   TPN     NPO   # days     Other        Anticipated discharge plans: TBD  LTACH:        SNF/Rehab:                  Home Health Care:           Outpatient Wound Center:            Self/Family Care:        Other:                  Vac Discharge Needs:   Not Applicable Pt not on a wound vac:    x   Regular Vac while inpatient, alternative dressing at DC:        Regular Vac in use and continued at DC:            Reg. Vac w/ Skin Sub/Biologic in use. Will need to be changed 2x wkly:      Veraflo Vac while inpatient, ok to transition to Regular Vac on Discharge:           Veraflo Vac while inpatient, will need to remain on Veraflo Vac upon discharge:

## 2022-07-28 NOTE — PROGRESS NOTES
[facility] Medicine Daily Progress Note    Date of Service  7/28/2022    Chief Complaint  Johann Davidson is a 87 y.o. male admitted 7/24/2022 with dizziness.    Hospital Course  Johann Davidson is a 87 y.o. male who presented 7/24/2022 with dizziness, ataxia, leaning to left. Patient with history of dementia alert and oriented to self at baseline, hypertension, who presents from his facility for concern for dizziness, leaning to left, ataxia. Patient seen at bedside, he is only oriented to self. He claims he came from [place] 3 days ago and is still getting settled. He is able to converse with me but does not know how he got here. He states his son Tad lives in [place]. Not able to obtain any more meaningful history from patient as he denies any symptoms. No family contact information in chart. Unsure even which facility patient came from.  In the ED, /107. Hgb 12.4, CBC and CMP otherwise fairly unremarkable. EKG with RBBB, CXR with cardiomegaly and interstitial infiltrates. CT head with prominent lateral and third ventricles otherwise no acute abnormalities. Patient will be admitted for possible stroke and evaluation.  MRI was not obtained due to severe claustrophobia.  Hospital course complicated by urinary retention and UTI.  He had tight phimosis so nursing staff unable to place Krueger, urology consulted and placed Krueger catheter on 7/27.  He was started on ceftriaxone.    Interval Problem Update  - urology placed krueger catheter yesterday  - more awake and seems back to baseline today      I have discussed this patient's plan of care and discharge plan at IDT rounds today with Case Management, Nursing, Nursing leadership, and other members of the IDT team.      Code Status  DNAR/DNI    Disposition  Patient is medically cleared for discharge.   Anticipate discharge back to memory care facility.  I have placed the appropriate orders for post-discharge needs.    Review of Systems  Review of Systems   Constitutional:  Negative for chills and fever.   HENT: Negative for nosebleeds.    Gastrointestinal: Negative for nausea and vomiting.   Genitourinary:        Urinary retention, krueger in place   Musculoskeletal: Negative for falls.   Skin:        Scattered abrasions and excoriations   Neurological: Negative for seizures and loss of consciousness.   Psychiatric/Behavioral: Positive for memory loss.        Physical Exam  Temp:  [36.5 °C (97.7 °F)-37 °C (98.6 °F)] 36.7 °C (98.1 °F)  Pulse:  [56-67] 58  Resp:  [16-18] 16  BP: ()/(60-84) 93/64  SpO2:  [91 %-96 %] 94 %    Physical Exam  Constitutional:       General: He is not in acute distress.     Appearance: He is not toxic-appearing or diaphoretic.      Comments: Still prefers to have eyes closed but does open and answer questions today   HENT:      Head: Normocephalic and atraumatic.      Right Ear: External ear normal.      Left Ear: External ear normal.      Mouth/Throat:      Pharynx: No oropharyngeal exudate or posterior oropharyngeal erythema.   Eyes:      General: No scleral icterus.     Conjunctiva/sclera: Conjunctivae normal.      Comments: R eyelid erythematous with some watery drainage   Cardiovascular:      Rate and Rhythm: Normal rate and regular rhythm.      Pulses: Normal pulses.      Heart sounds: Normal heart sounds.   Pulmonary:      Effort: Pulmonary effort is normal. No respiratory distress.      Breath sounds: Normal breath sounds. No wheezing.   Abdominal:      General: Bowel sounds are normal. There is no distension.      Palpations: Abdomen is soft.      Tenderness: There is no abdominal tenderness.   Musculoskeletal:         General: No swelling or tenderness.      Cervical back: Normal range of motion and neck supple.   Skin:     Findings: Erythema (R arm) and lesion (scattered scabs and excoriations) present.   Neurological:      Comments: Oriented x 2 (person, place)   Psychiatric:         Mood and Affect: Mood normal.         Behavior: Behavior  normal.         Fluids    Intake/Output Summary (Last 24 hours) at 7/28/2022 0942  Last data filed at 7/28/2022 0550  Gross per 24 hour   Intake 2417.5 ml   Output 1150 ml   Net 1267.5 ml       Laboratory  Recent Labs     07/27/22  1349   WBC 6.1   RBC 3.62*   HEMOGLOBIN 11.7*   HEMATOCRIT 33.8*   MCV 93.4   MCH 32.3   MCHC 34.6   RDW 42.8   PLATELETCT 254   MPV 9.7     Recent Labs     07/27/22  1349   SODIUM 137   POTASSIUM 3.7   CHLORIDE 106   CO2 20   GLUCOSE 117*   BUN 12   CREATININE 0.93   CALCIUM 8.4*                   Imaging  US-CAROTID DOPPLER BILAT   Final Result      CT-HEAD W/O   Final Result         1. No acute intracranial abnormality. No evidence of acute intracranial hemorrhage or mass lesion.      2. Prominent lateral and third ventricles.               DX-CHEST-PORTABLE (1 VIEW)   Final Result      1.  Cardiomegaly.      2.  Bilateral interstitial infiltrates.           Assessment/Plan  * Ataxia- (present on admission)  Assessment & Plan  Reported ataxia and dizziness with leaning to the left from patient's facility  Hx dementia, alert and oriented only to self, appears at baseline mentation  CT head normal, carotid US normal  Lipid panel, A1c, B12, ammonia, TSH all normal.  Discussed with son, who is ok with patient discharging back to memory care facility. Will defer MRI as low suspicion of stroke and patient does not tolerate MRI well in the past. Patient is medically cleared to discharge back to care facility.    Urinary retention  Assessment & Plan  Patient with tight phimosis, nursing staff unable to cath  Consulted urology: placed krueger 7/27  Started flomax    Acute cystitis without hematuria  Assessment & Plan  UA c/f infection  Ucx pending  Likely in setting of urinary retention  On CTX    Vitamin D deficiency  Assessment & Plan  Vitamin D supplementation initiated    Dysphagia  Assessment & Plan  Improving  ST consulted  CXR abnormal      Dementia (HCC)  Assessment & Plan  History  of,  More confused today  Interventions to minimize the risk of delirium.   -do not disturb patient (vitals or lab draws) between the hours of 10 PM and 6 AM.  -frequent reorientation  -avoid sedatives  -up in chair for meals  -watch for constipation  -remove all unnecessary lines           VTE prophylaxis: SCDs/TEDs    I have performed a physical exam and reviewed and updated ROS and Plan today (7/28/2022). In review of yesterday's note (7/27/2022), there are no changes except as documented above.

## 2022-07-28 NOTE — PROGRESS NOTES
Palliative Care   Patient's son Tad emailed this APRN patient's Advance Directive (AD) signed 8/28/2021, listing son Tad Davidson ([phone]) as DPOA-HC; with nephew Gab Oconnor ([phone]) as First Alternate; and nephew Barron Oconnor ([phone]) as Second Alternate.  This APRN Scanned AD into EMR.    Ekta Davis, SID, APRN, [facility]  Palliative Care Nurse Practitioner  [phone]

## 2022-07-28 NOTE — CARE PLAN
Problem: Optimal Care of the Stroke Patient  Goal: Optimal emergency care for the stroke patient  Outcome: Not Met  Goal: Optimal acute care for the stroke patient  Outcome: Not Met     Problem: Knowledge Deficit - Stroke Education  Goal: Patient's knowledge of stroke and risk factors will improve  Outcome: Not Met     Problem: Psychosocial - Patient Condition  Goal: Patient's ability to verbalize feelings about condition will improve  Outcome: Not Met  Goal: Patient's ability to re-evaluate and adapt role responsibilities will improve  Outcome: Not Met     Problem: Discharge Planning - Stroke  Goal: Ensure Stroke Core Measures are met prior to discharge  Outcome: Not Met  Goal: Patient’s continuum of care needs will be met  Outcome: Not Met     Problem: Neuro Status  Goal: Neuro status will remain stable or improve  Outcome: Not Met     Problem: Hemodynamic Monitoring  Goal: Patient's hemodynamics, fluid balance and neurologic status will be stable or improve  Outcome: Not Met     Problem: Respiratory - Stroke Patient  Goal: Patient will achieve/maintain optimum respiratory rate/effort  Outcome: Not Met     Problem: Risk for Aspiration  Goal: Patient's risk for aspiration will be absent or decrease  Outcome: Not Met     Problem: Urinary Elimination  Goal: Establish and maintain regular urinary output  Outcome: Not Met     Problem: Bowel Elimination  Goal: Establish and maintain regular bowel function  Outcome: Not Met     Problem: Mobility - Stroke  Goal: Patient's capacity to carry out activities will improve  Outcome: Not Met  Goal: Spasticity will be prevented or improved  Outcome: Not Met  Goal: Subluxation will be prevented or improved  Outcome: Not Met     Problem: Self Care  Goal: Patient will have the ability to perform ADLs independently or with assistance (bathe, groom, dress, toilet and feed)  Outcome: Not Met     Problem: Knowledge Deficit - Standard  Goal: Patient and family/care givers will  demonstrate understanding of plan of care, disease process/condition, diagnostic tests and medications  Outcome: Not Met     Problem: Skin Integrity  Goal: Skin integrity is maintained or improved  Outcome: Not Met     Problem: Fall Risk  Goal: Patient will remain free from falls  Outcome: Not Met     Problem: Pain - Standard  Goal: Alleviation of pain or a reduction in pain to the patient’s comfort goal  Outcome: Not Met   The patient is Stable - Low risk of patient condition declining or worsening    Shift Goals  Clinical Goals: Safety  Patient Goals: MAMI  Family Goals: MAMI    Progress made toward(s) clinical / shift goals:      Patient is not progressing towards the following goals:      Problem: Optimal Care of the Stroke Patient  Goal: Optimal emergency care for the stroke patient  Outcome: Not Met  Goal: Optimal acute care for the stroke patient  Outcome: Not Met     Problem: Knowledge Deficit - Stroke Education  Goal: Patient's knowledge of stroke and risk factors will improve  Outcome: Not Met     Problem: Psychosocial - Patient Condition  Goal: Patient's ability to verbalize feelings about condition will improve  Outcome: Not Met  Goal: Patient's ability to re-evaluate and adapt role responsibilities will improve  Outcome: Not Met     Problem: Discharge Planning - Stroke  Goal: Ensure Stroke Core Measures are met prior to discharge  Outcome: Not Met  Goal: Patient’s continuum of care needs will be met  Outcome: Not Met     Problem: Neuro Status  Goal: Neuro status will remain stable or improve  Outcome: Not Met     Problem: Hemodynamic Monitoring  Goal: Patient's hemodynamics, fluid balance and neurologic status will be stable or improve  Outcome: Not Met     Problem: Respiratory - Stroke Patient  Goal: Patient will achieve/maintain optimum respiratory rate/effort  Outcome: Not Met     Problem: Risk for Aspiration  Goal: Patient's risk for aspiration will be absent or decrease  Outcome: Not Met     Problem:  Urinary Elimination  Goal: Establish and maintain regular urinary output  Outcome: Not Met     Problem: Bowel Elimination  Goal: Establish and maintain regular bowel function  Outcome: Not Met     Problem: Mobility - Stroke  Goal: Patient's capacity to carry out activities will improve  Outcome: Not Met  Goal: Spasticity will be prevented or improved  Outcome: Not Met  Goal: Subluxation will be prevented or improved  Outcome: Not Met     Problem: Self Care  Goal: Patient will have the ability to perform ADLs independently or with assistance (bathe, groom, dress, toilet and feed)  Outcome: Not Met     Problem: Knowledge Deficit - Standard  Goal: Patient and family/care givers will demonstrate understanding of plan of care, disease process/condition, diagnostic tests and medications  Outcome: Not Met     Problem: Skin Integrity  Goal: Skin integrity is maintained or improved  Outcome: Not Met     Problem: Fall Risk  Goal: Patient will remain free from falls  Outcome: Not Met     Problem: Pain - Standard  Goal: Alleviation of pain or a reduction in pain to the patient’s comfort goal  Outcome: Not Met

## 2022-07-28 NOTE — THERAPY
"Speech Language Pathology  Daily Treatment     Patient Name: Johann Davidson  Age:  87 y.o., Sex:  male  Medical Record #: [id number]  Today's Date: 7/28/2022     Precautions  Precautions: (P) Fall Risk, Swallow Precautions ( See Comments)  Comments: hx of dementia    Subjective  Pt awakened to SLP arrival, agreeable to PO trials and diet check. Pt reported no difficulty eating with breakfast except \"I still need help opening that orange juice\". Pt continues to be disoriented and confused. Follows directions, can be redirected.    Assessment  PO trials were administered. Pt demonstrated single sips of thin liquids from the side of the cup with min assist from SLP x10 with no overt s/sx of aspiration. Approx. 8oz. No change in vocal quality, no cough/clear, no s/sx of respiratory distress. Pt demonstrated sequential sips of thin liquids x1, no overt s/sx of aspiration. Pt demonstrated PO trials of puree and regular solids. Limited ability to self-feed, required mod assist from SLP to use spoon for puree, attempted to drink puree x3. Mastication timely. No oral residue noted. Pt demonstrated dry cough x1 approx. 2 min after PO trials. No prolonged cough, no cleared residue.    Clinical Impressions  Pt demonstrated functional swallow of RG7/TN0 upon being aroused by SLP. Pt continues to need assistance with self feeding and monitoring to ensure swallow safety. Continue to hold tray given pt lethargy or increased confusion. Discontinue PO if s/sx of aspiration occur.     Recommendations  1.  Continue regular solids with thin liquids (RG7/TN0).  2.  Swallowing Instructions & Precautions:   Supervision: Careful 1:1 hand feeding, Encourage self-feeding  Positioning: Fully upright and midline during oral intake  Medication: Whole with puree, As tolerated  Strategies: Small bites/sips, Slow rate of intake  Oral Care: Q8h  3.  Hold tray given pt lethargy or significantly increased confusion.    Plan    Continue current treatment " "plan.    Discharge Recommendations: (P) Recommend post-acute placement for additional speech therapy services prior to discharge home.     Objective   07/28/22 1137   Precautions   Precautions Fall Risk;Swallow Precautions ( See Comments)   Vitals   O2 Delivery Device None - Room Air   Pain 0 - 10 Group   Therapist Pain Assessment Post Activity Pain Same as Prior to Activity;0   Patient / Family Goals   Patient / Family Goal #1 \"More ice\"   Goal #1 Outcome Progressing as expected   Short Term Goals   Short Term Goal # 1 Patient will consume prefeeding trials with SLP with no overt s/sx of aspiration.   Goal Outcome # 1 Goal met   Short Term Goal # 2 Pt will complete instrumental swallowing exam with SLP to determine appropriate diet recommendations.   Goal Outcome # 2  Goal not met   Short Term Goal # 3 Pt will consume diet of RG7/TN0 with no overt s/sx of aspiration.   Goal Outcome  # 3 Progressing as expected   Education Group   Education Provided Dysphagia;Role of Speech Therapy   Dysphagia Patient Response Patient;Acceptance;Explanation;Verbal Demonstration;Reinforcement Needed   Role of SLP Patient Response Patient;Acceptance;Explanation;Verbal Demonstration;Reinforcement Needed   Anticipated Discharge Needs   Discharge Recommendations Recommend home health for continued speech therapy services   Therapy Recommendations Upon DC Dysphagia Training;Community Re-Integration;Patient / Family / Caregiver Education   Interdisciplinary Plan of Care Collaboration   IDT Collaboration with  Nursing   Patient Position at End of Therapy In Bed;Bed Alarm On;Call Light within Reach;Tray Table within Reach  (All verbalized needs met)     "

## 2022-07-29 PROBLEM — T07.XXXA MULTIPLE EXCORIATIONS: Status: ACTIVE | Noted: 2022-01-01

## 2022-07-29 NOTE — ASSESSMENT & PLAN NOTE
To R arm  C/o pruritis  HC ointment + aquaphor BID  Does have some surrounding erythema and warmth but denies pain, already on abx

## 2022-07-29 NOTE — PROGRESS NOTES
[facility] Medicine Daily Progress Note    Date of Service  7/29/2022    Chief Complaint  Johann Davidson is a 87 y.o. male admitted 7/24/2022 with dizziness.    Hospital Course  Johann Davidson is a 87 y.o. male who presented 7/24/2022 with dizziness, ataxia, leaning to left. Patient with history of dementia alert and oriented to self at baseline, hypertension, who presents from his facility for concern for dizziness, leaning to left, ataxia. Patient seen at bedside, he is only oriented to self. He claims he came from [place] 3 days ago and is still getting settled. He is able to converse with me but does not know how he got here. He states his son Tad lives in [place]. Not able to obtain any more meaningful history from patient as he denies any symptoms. No family contact information in chart. Unsure even which facility patient came from.  In the ED, /107. Hgb 12.4, CBC and CMP otherwise fairly unremarkable. EKG with RBBB, CXR with cardiomegaly and interstitial infiltrates. CT head with prominent lateral and third ventricles otherwise no acute abnormalities. Patient will be admitted for possible stroke and evaluation.  MRI was not obtained due to severe claustrophobia.  Hospital course complicated by urinary retention and UTI.  He had tight phimosis so nursing staff unable to place Redd, urology consulted and placed Redd catheter on 7/27.  He was started on ceftriaxone however ucx returned Pseudomonas and Enterococcus, he was transitioned to Zosyn.  Patient had right arm excoriations and itching with some surrounding erythema and warmth (already on antibiotics for UTI).  Started hydrocortisone and Aquaphor.    Interval Problem Update  -Patient complaining of right arm itching, will add hydrocortisone and Aquaphor  -Urine culture growing Pseudomonas and Enterococcus, transitioned from ceftriaxone to Zosyn    I have discussed this patient's plan of care and discharge plan at IDT rounds today with Case Management,  Nursing, Nursing leadership, and other members of the IDT team.      Code Status  DNAR/DNI    Disposition  Patient is medically cleared for discharge.   Anticipate discharge back to memory care facility.  I have placed the appropriate orders for post-discharge needs.    Review of Systems  Review of Systems   Constitutional: Negative for chills and fever.   HENT: Negative for nosebleeds.    Eyes:        R eyelid redness   Respiratory: Negative for shortness of breath.    Cardiovascular: Negative for chest pain.   Gastrointestinal: Negative for nausea and vomiting.   Genitourinary:        Urinary retention, krueger in place   Musculoskeletal: Negative for falls.   Skin: Positive for itching.        Scattered abrasions and excoriations   Neurological: Negative for seizures and loss of consciousness.   Psychiatric/Behavioral: Positive for memory loss.        Physical Exam  Temp:  [36.7 °C (98.1 °F)-37 °C (98.6 °F)] 36.7 °C (98.1 °F)  Pulse:  [55-75] 55  Resp:  [16] 16  BP: (132-138)/(69-75) 132/69  SpO2:  [94 %-98 %] 98 %    Physical Exam  Constitutional:       General: He is not in acute distress.     Appearance: He is not toxic-appearing or diaphoretic.   HENT:      Head: Normocephalic and atraumatic.      Right Ear: External ear normal.      Left Ear: External ear normal.      Nose: Nose normal.      Mouth/Throat:      Pharynx: No oropharyngeal exudate or posterior oropharyngeal erythema.   Eyes:      General: No scleral icterus.     Conjunctiva/sclera: Conjunctivae normal.      Comments: R eyelid erythematous without drainage    Cardiovascular:      Rate and Rhythm: Regular rhythm. Bradycardia present.      Pulses: Normal pulses.      Heart sounds: Normal heart sounds.   Pulmonary:      Effort: Pulmonary effort is normal. No respiratory distress.      Breath sounds: Normal breath sounds. No wheezing.   Abdominal:      General: Bowel sounds are normal. There is no distension.      Palpations: Abdomen is soft.       Tenderness: There is no abdominal tenderness.   Musculoskeletal:         General: No swelling or tenderness.      Cervical back: Normal range of motion and neck supple.   Skin:     Findings: Erythema (R arm) and lesion (scattered scabs and excoriations) present.   Neurological:      Mental Status: He is alert. He is disoriented.   Psychiatric:         Mood and Affect: Mood normal.         Behavior: Behavior normal.         Fluids    Intake/Output Summary (Last 24 hours) at 7/29/2022 1507  Last data filed at 7/29/2022 0540  Gross per 24 hour   Intake 240 ml   Output 1325 ml   Net -1085 ml       Laboratory  Recent Labs     07/27/22  1349   WBC 6.1   RBC 3.62*   HEMOGLOBIN 11.7*   HEMATOCRIT 33.8*   MCV 93.4   MCH 32.3   MCHC 34.6   RDW 42.8   PLATELETCT 254   MPV 9.7     Recent Labs     07/27/22  1349   SODIUM 137   POTASSIUM 3.7   CHLORIDE 106   CO2 20   GLUCOSE 117*   BUN 12   CREATININE 0.93   CALCIUM 8.4*                   Imaging  US-CAROTID DOPPLER BILAT   Final Result      CT-HEAD W/O   Final Result         1. No acute intracranial abnormality. No evidence of acute intracranial hemorrhage or mass lesion.      2. Prominent lateral and third ventricles.               DX-CHEST-PORTABLE (1 VIEW)   Final Result      1.  Cardiomegaly.      2.  Bilateral interstitial infiltrates.      US-EXTREMITY ARTERY LOWER UNILAT W/ZOEY (COMBO) RIGHT    (Results Pending)        Assessment/Plan  * Ataxia- (present on admission)  Assessment & Plan  Reported ataxia and dizziness with leaning to the left from patient's facility  Hx dementia, alert and oriented only to self, appears at baseline mentation  CT head normal, carotid US normal  Lipid panel, A1c, B12, ammonia, TSH all normal.  Discussed with son, who is ok with patient discharging back to memory care facility. Will defer MRI as low suspicion of stroke and patient does not tolerate MRI well in the past. Patient is medically cleared to discharge back to care  facility.    Multiple excoriations  Assessment & Plan  To R arm  C/o pruritis  HC ointment + aquaphor BID  Does have some surrounding erythema and warmth but denies pain, already on abx     Urinary retention  Assessment & Plan  Patient with tight phimosis, nursing staff unable to cath  Consulted urology: placed krueger 7/27  Started flomax    Acute cystitis without hematuria  Assessment & Plan  UA c/f infection  Ucx grew Pseudomonas and Enterococcus  Likely in setting of urinary retention  On CTX-->zosyn  Has allergy to cipro, will talk to facility regarding severity     Vitamin D deficiency  Assessment & Plan  Vitamin D supplementation initiated    Dysphagia  Assessment & Plan  Improving  ST consulted  CXR abnormal      Dementia (HCC)  Assessment & Plan  History of,  More confused today  Interventions to minimize the risk of delirium.   -do not disturb patient (vitals or lab draws) between the hours of 10 PM and 6 AM.  -frequent reorientation  -avoid sedatives  -up in chair for meals  -watch for constipation  -remove all unnecessary lines           VTE prophylaxis: SCDs/TEDs and enoxaparin ppx    I have performed a physical exam and reviewed and updated ROS and Plan today (7/29/2022). In review of yesterday's note (7/28/2022), there are no changes except as documented above.

## 2022-07-30 PROBLEM — R13.10 DYSPHAGIA: Status: RESOLVED | Noted: 2022-01-01 | Resolved: 2022-01-01

## 2022-07-30 NOTE — CARE PLAN
The patient is Stable - Low risk of patient condition declining or worsening    Shift Goals  Clinical Goals: Safety, maintain skin integrity  Patient Goals: MAMI  Family Goals: MAMI    Progress made toward(s) clinical / shift goals:  Assumed care of patient at 1900. Patient is alert and oriented to self only at baseline. Q4hr neuro checks are in place. Q2hr turns in place to maintain skin integrity. Tele-sitter in place for safety. Bed is low and locked, bed alarm is on, call light is within reach. Patient denies pain, no further needs indicated at this time.      Problem: Optimal Care of the Stroke Patient  Goal: Optimal emergency care for the stroke patient  Outcome: Progressing     Problem: Neuro Status  Goal: Neuro status will remain stable or improve  Outcome: Progressing       Patient is not progressing towards the following goals:      Problem: Knowledge Deficit - Stroke Education  Goal: Patient's knowledge of stroke and risk factors will improve  Outcome: Not Progressing

## 2022-07-30 NOTE — PROGRESS NOTES
[facility] Medicine Daily Progress Note    Date of Service  7/30/2022    Chief Complaint  Johann Davidson is a 87 y.o. male admitted 7/24/2022 with dizziness.    Hospital Course  Johann Davidson is a 87 y.o. male who presented 7/24/2022 with dizziness, ataxia, leaning to left. Patient with history of dementia alert and oriented to self at baseline, hypertension, who presents from his facility for concern for dizziness, leaning to left, ataxia. Patient seen at bedside, he is only oriented to self. He claims he came from [place] 3 days ago and is still getting settled. He is able to converse with me but does not know how he got here. He states his son Tad lives in [place]. Not able to obtain any more meaningful history from patient as he denies any symptoms. No family contact information in chart. Unsure even which facility patient came from.  In the ED, /107. Hgb 12.4, CBC and CMP otherwise fairly unremarkable. EKG with RBBB, CXR with cardiomegaly and interstitial infiltrates. CT head with prominent lateral and third ventricles otherwise no acute abnormalities. Patient will be admitted for possible stroke and evaluation.  MRI was not obtained due to severe claustrophobia.  Hospital course complicated by urinary retention and UTI.  He had tight phimosis so nursing staff unable to place Redd, urology consulted and placed Redd catheter on 7/27.  He was started on ceftriaxone however ucx returned Pseudomonas and Enterococcus, he was transitioned to Zosyn.  Patient had right arm excoriations and itching with some surrounding erythema and warmth (already on antibiotics for UTI).  Started hydrocortisone and Aquaphor. Wound team was consulted for R heel wound which recommended wound care and ZOEY, ZOEY normal.     Interval Problem Update  - Arm itching improved today  - having some word finding difficulties today  - wound team ordered ZOEY for R heel wound which was normal    I have discussed this patient's plan of care and  discharge plan at IDT rounds today with Case Management, Nursing, Nursing leadership, and other members of the IDT team.      Code Status  DNAR/DNI    Disposition  Patient is medically cleared for discharge.   Anticipate discharge back to memory care facility.  I have placed the appropriate orders for post-discharge needs.    Review of Systems  Review of Systems   Constitutional: Negative for chills and fever.   HENT: Negative for nosebleeds.    Eyes:        R eyelid redness   Respiratory: Negative for shortness of breath.    Cardiovascular: Negative for chest pain.   Gastrointestinal: Negative for nausea and vomiting.   Genitourinary:        Urinary retention, krueger in place   Musculoskeletal: Negative for falls.   Skin: Negative for itching.        Scattered abrasions and excoriations   Neurological: Negative for seizures and loss of consciousness.   Psychiatric/Behavioral: Positive for memory loss.        Physical Exam  Temp:  [36.4 °C (97.5 °F)-37 °C (98.6 °F)] 37 °C (98.6 °F)  Pulse:  [60-67] 67  Resp:  [15-16] 16  BP: (121-154)/(59-84) 154/84  SpO2:  [93 %-96 %] 96 %    Physical Exam  Constitutional:       General: He is not in acute distress.     Appearance: He is not toxic-appearing or diaphoretic.   HENT:      Head: Normocephalic and atraumatic.      Right Ear: External ear normal.      Left Ear: External ear normal.      Nose: Nose normal.      Mouth/Throat:      Pharynx: No oropharyngeal exudate or posterior oropharyngeal erythema.   Eyes:      General: No scleral icterus.     Conjunctiva/sclera: Conjunctivae normal.      Comments: R eyelid erythematous without drainage    Cardiovascular:      Rate and Rhythm: Normal rate and regular rhythm.      Pulses: Normal pulses.      Heart sounds: Normal heart sounds.   Pulmonary:      Effort: Pulmonary effort is normal. No respiratory distress.      Breath sounds: Normal breath sounds. No wheezing.   Abdominal:      General: Bowel sounds are normal. There is no  distension.      Palpations: Abdomen is soft.      Tenderness: There is no abdominal tenderness.   Musculoskeletal:         General: No swelling or tenderness.      Cervical back: Normal range of motion and neck supple.   Skin:     Findings: Erythema (R arm) and lesion (scattered scabs and excoriations) present.   Neurological:      Mental Status: He is alert. He is disoriented.   Psychiatric:         Mood and Affect: Mood normal.         Behavior: Behavior normal.         Fluids    Intake/Output Summary (Last 24 hours) at 7/30/2022 1526  Last data filed at 7/30/2022 0800  Gross per 24 hour   Intake 240 ml   Output 3050 ml   Net -2810 ml       Laboratory                        Imaging  US-ZOEY SINGLE LEVEL BILAT   Final Result      US-CAROTID DOPPLER BILAT   Final Result      CT-HEAD W/O   Final Result         1. No acute intracranial abnormality. No evidence of acute intracranial hemorrhage or mass lesion.      2. Prominent lateral and third ventricles.               DX-CHEST-PORTABLE (1 VIEW)   Final Result      1.  Cardiomegaly.      2.  Bilateral interstitial infiltrates.           Assessment/Plan  * Ataxia- (present on admission)  Assessment & Plan  Reported ataxia and dizziness with leaning to the left from patient's facility  Hx dementia, alert and oriented only to self, appears at baseline mentation  CT head normal, carotid US normal  Lipid panel, A1c, B12, ammonia, TSH all normal.  Discussed with son, who is ok with patient discharging back to memory care facility. Will defer MRI as low suspicion of stroke and patient does not tolerate MRI well in the past. Patient is medically cleared to discharge back to care facility.    Multiple excoriations  Assessment & Plan  To R arm  C/o pruritis  HC ointment + aquaphor BID  Does have some surrounding erythema and warmth but denies pain, already on abx     Urinary retention  Assessment & Plan  Patient with tight phimosis, nursing staff unable to cath  Consulted  urology: placed krueger 7/27  Started flomax    Acute cystitis without hematuria  Assessment & Plan  UA c/f infection  Ucx grew Pseudomonas and Enterococcus  Likely in setting of urinary retention  On CTX-->zosyn-->gentamicin x 1, amoxicillin to complete 7 day course   Has allergy to cipro, will talk to facility regarding severity     Vitamin D deficiency  Assessment & Plan  Vitamin D supplementation initiated    Dementia (HCC)  Assessment & Plan  History of,  More confused today  Interventions to minimize the risk of delirium.   -do not disturb patient (vitals or lab draws) between the hours of 10 PM and 6 AM.  -frequent reorientation  -avoid sedatives  -up in chair for meals  -watch for constipation  -remove all unnecessary lines           VTE prophylaxis: SCDs/TEDs and Xarelto 10 mg daily as prophylaxis    I have performed a physical exam and reviewed and updated ROS and Plan today (7/30/2022). In review of yesterday's note (7/29/2022), there are no changes except as documented above.

## 2022-07-31 NOTE — DISCHARGE PLANNING
Agency/Facility Name: [facility]   Spoke To: [facility]   Outcome: Per [facility] transportation is confirmed for 1400 today.  RN CM notified

## 2022-07-31 NOTE — CARE PLAN
The patient is Stable - Low risk of patient condition declining or worsening    Shift Goals  Clinical Goals: Safety, decrease skin breakdown  Patient Goals: MAMI  Family Goals: MAMI    Progress made toward(s) clinical / shift goals:  Appropriate fall and wound precautions in place.    Problem: Fall Risk  Goal: Patient will remain free from falls  Outcome: Progressing     Problem: Skin Integrity  Goal: Skin integrity is maintained or improved  Outcome: Progressing

## 2022-07-31 NOTE — DISCHARGE PLANNING
Case Management Discharge Planning    Admission Date: 7/24/2022  GMLOS: 3  ALOS: 4    6-Clicks ADL Score: 11  6-Clicks Mobility Score: 6  PT and/or OT Eval ordered: No  Post-acute Referrals Ordered: No  Post-acute Choice Obtained: No  Has referral(s) been sent to post-acute provider:  NA      Anticipated Discharge Dispo: Discharge Disposition: Discharged to home/self care (01)    DME Needed: No    Action(s) Taken: Updated Provider/Nurse on Discharge Plan    Escalations Completed: None    Medically Clear: Yes    Next Steps: Received call from bedside RN. Pt has been medically cleared for return to [facility] facility. Called [facility] , spoke to Anabel. She states pt can return today without restrictions. She requested [facility] transport for return. Spoke to pt's son, Tad who gave phone consent for pt to return to [facility]. [facility] transport in process.    Barriers to Discharge: Transportation    Pt will transfer back to [facility] when transport arranged.

## 2022-07-31 NOTE — DISCHARGE INSTRUCTIONS
Discharge Instructions    Discharged to other by ambulance with escort. Discharged via ambulance, hospital escort: Refused.  Special equipment needed: Not Applicable    Be sure to schedule a follow-up appointment with your primary care doctor or any specialists as instructed.     Discharge Plan:   Diet Plan: Discussed  Activity Level: Discussed  Confirmed Follow up Appointment: No (Comments) (Going to memory care facility)  Confirmed Symptoms Management: Discussed  Medication Reconciliation Updated: Yes    I understand that a diet low in cholesterol, fat, and sodium is recommended for good health. Unless I have been given specific instructions below for another diet, I accept this instruction as my diet prescription.       Special Instructions: None    -Is this patient being discharged with medication to prevent blood clots?  Yes, Aspirin   Aspirin, ASA oral tablets  What is this medicine?  ASPIRIN (AS pir in) is a pain reliever. It is used to treat mild pain and fever. This medicine is also used as directed by a doctor to prevent and to treat heart attacks, to prevent strokes and blood clots, and to treat arthritis or inflammation.  This medicine may be used for other purposes; ask your health care provider or pharmacist if you have questions.  COMMON BRAND NAME(S): Aspir-Low, Aspir-Ana Lilia, Aspirtab, Jeaneth Advanced Aspirin, Jeaneth Aspirin, Jeaneth Aspirin Extra Strength, Jeaneth Aspirin Plus, Jeaneth Extra Strength, Jeaneth Extra Strength Plus, Jeaneth Genuine Aspirin, Jeaneth Womens Aspirin, Bufferin, Bufferin Extra Strength, Bufferin Low Dose  What should I tell my health care provider before I take this medicine?  They need to know if you have any of these conditions:  anemia  asthma  bleeding problems  child with chickenpox, the flu, or other viral infection  diabetes  gout  if you frequently drink alcohol containing drinks  kidney disease  liver disease  low level of vitamin K  lupus  smoke tobacco  stomach ulcers or other  problems  an unusual or allergic reaction to aspirin, tartrazine dye, other medicines, dyes, or preservatives  pregnant or trying to get pregnant  breast-feeding  How should I use this medicine?  Take this medicine by mouth with a glass of water. Follow the directions on the package or prescription label. You can take this medicine with or without food. If it upsets your stomach, take it with food. Do not take your medicine more often than directed.  Talk to your pediatrician regarding the use of this medicine in children. While this drug may be prescribed for children as young as 12 years of age for selected conditions, precautions do apply. Children and teenagers should not use this medicine to treat chicken pox or flu symptoms unless directed by a doctor.  Patients over 65 years old may have a stronger reaction and need a smaller dose.  Overdosage: If you think you have taken too much of this medicine contact a poison control center or emergency room at once.  NOTE: This medicine is only for you. Do not share this medicine with others.  What if I miss a dose?  If you are taking this medicine on a regular schedule and miss a dose, take it as soon as you can. If it is almost time for your next dose, take only that dose. Do not take double or extra doses.  What may interact with this medicine?  Do not take this medicine with any of the following medications:  cidofovir  ketorolac  probenecid  This medicine may also interact with the following medications:  alcohol  alendronate  bismuth subsalicylate  flavocoxid  herbal supplements like feverfew, garlic, jenaro, ginkgo biloba, horse chestnut  medicines for diabetes or glaucoma like acetazolamide, methazolamide  medicines for gout  medicines that treat or prevent blood clots like enoxaparin, heparin, ticlopidine, warfarin  other aspirin and aspirin-like medicines  NSAIDs, medicines for pain and inflammation, like ibuprofen or  naproxen  pemetrexed  sulfinpyrazone  varicella live vaccine  This list may not describe all possible interactions. Give your health care provider a list of all the medicines, herbs, non-prescription drugs, or dietary supplements you use. Also tell them if you smoke, drink alcohol, or use illegal drugs. Some items may interact with your medicine.  What should I watch for while using this medicine?  If you are treating yourself for pain, tell your doctor or health care professional if the pain lasts more than 10 days, if it gets worse, or if there is a new or different kind of pain. Tell your doctor if you see redness or swelling. Also, check with your doctor if you have a fever that lasts for more than 3 days. Only take this medicine to prevent heart attacks or blood clotting if prescribed by your doctor or health care professional.  Do not take aspirin or aspirin-like medicines with this medicine. Too much aspirin can be dangerous. Always read the labels carefully.  This medicine can irritate your stomach or cause bleeding problems. Do not smoke cigarettes or drink alcohol while taking this medicine. Do not lie down for 30 minutes after taking this medicine to prevent irritation to your throat.  If you are scheduled for any medical or dental procedure, tell your healthcare provider that you are taking this medicine. You may need to stop taking this medicine before the procedure.  This medicine may be used to treat migraines. If you take migraine medicines for 10 or more days a month, your migraines may get worse. Keep a diary of headache days and medicine use. Contact your healthcare professional if your migraine attacks occur more frequently.  What side effects may I notice from receiving this medicine?  Side effects that you should report to your doctor or health care professional as soon as possible:  allergic reactions like skin rash, itching or hives, swelling of the face, lips, or tongue  breathing  problems  changes in hearing, ringing in the ears  confusion  general ill feeling or flu-like symptoms  pain on swallowing  redness, blistering, peeling or loosening of the skin, including inside the mouth or nose  signs and symptoms of bleeding such as bloody or black, tarry stools; red or dark-brown urine; spitting up blood or brown material that looks like coffee grounds; red spots on the skin; unusual bruising or bleeding from the eye, gums, or nose  trouble passing urine or change in the amount of urine  unusually weak or tired  yellowing of the eyes or skin  Side effects that usually do not require medical attention (report to your doctor or health care professional if they continue or are bothersome):  diarrhea or constipation  headache  nausea, vomiting  stomach gas, heartburn  This list may not describe all possible side effects. Call your doctor for medical advice about side effects. You may report side effects to FDA at 1-025-FDA-8686.  Where should I keep my medicine?  Keep out of the reach of children.  Store at room temperature between 15 and 30 degrees C (59 and 86 degrees F). Protect from heat and moisture. Do not use this medicine if it has a strong vinegar smell. Throw away any unused medicine after the expiration date.  NOTE: This sheet is a summary. It may not cover all possible information. If you have questions about this medicine, talk to your doctor, pharmacist, or health care provider.  © 2020 [employer]vier/Gold Standard (2018-01-30 10:42:13)    Is patient discharged on Warfarin / Coumadin?   No

## 2022-08-02 NOTE — DISCHARGE PLANNING
RN has notified SW Pt is medically cleared and can return to his home at [facility].   SW called [facility] and spoke to Anabel she stated Pt is in Room 5.     PCS completed and faxed to [facility]  Transportation time arranged for 2215    RN updated on transfer and transfer time.

## 2022-08-02 NOTE — ED PROVIDER NOTES
"CHIEF COMPLAINT  Chief Complaint   Patient presents with   • GLF       HPI  Johann Davidson is a 87 y.o. male who presents for evaluation of a TBI.  Patient had a reported mechanical fall at his care facility.  Patient does have an underlying history of dementia, is on aspirin daily.  Currently is complaining of pain around the back of his head.  Though no other complaints.  Other than complaining of pain the patientis unable to provide any meaningful history though EMS states he is at his baseline mentation per staff at his care facility.    REVIEW OF SYSTEMS  Unable to obtain due to medical condition    PAST MEDICAL HISTORY   has a past medical history of Benign prostate hyperplasia, Dementia (HCC), GERD without esophagitis, and Secondary hypertension.    SOCIAL HISTORY  Social History     Tobacco Use   • Smoking status: Former Smoker     Types: Cigarettes     Quit date: 1985     Years since quittin.1   • Smokeless tobacco: Never Used   Vaping Use   • Vaping Use: Never used   Substance and Sexual Activity   • Alcohol use: Not Currently   • Drug use: Never   • Sexual activity: Not on file       SURGICAL HISTORY  patient denies any surgical history    CURRENT MEDICATIONS  Home Medications    **Home medications have not yet been reviewed for this encounter**         ALLERGIES  Allergies   Allergen Reactions   • Ciprofloxacin      Per MAR from [facility]    • Lidocaine      Per MAR from [facility]    • Tetanus Toxoid      Per MAR from [facility]        FAMILY HISTORY  No pertinent family history    PHYSICAL EXAM   Ht 1.803 m (5' 11\")   Wt 81.6 kg (180 lb)   BMI 25.10 kg/m²  @ALKA[[id number]::@   Pulse ox interpretation: I interpret this pulse ox as normal.  VITALS - vital signs documented prior to this note have been reviewed and noted,  GENERAL - awake, alert, oriented to person, GCS 14, no apparent distress, non-toxic  appearing  HEENT - normocephalic, atraumatic, pupils equal, sclera anicteric, mucus  membranes " moist  NECK - supple, no meningismus, full active range of motion, trachea midline  CARDIOVASCULAR - regular rate/rhythm, no murmurs/gallops/rubs  PULMONARY - no respiratory distress, speaking in full sentences, clear to  auscultation bilaterally, no wheezing/ronchi/rales, no accessory muscle use  GASTROINTESTINAL - soft, non-tender, non-distended, no rebound, guarding,  or peritonitis  Pelvis stable to anterior posterior compression  GENITOURINARY - Redd catheter in place  NEUROLOGIC - he is awake he is alert to person he is moving all extremities pupils are 2 and reactive   MUSCULOSKELETAL - no obvious asymmetry or deformities present  EXTREMITIES - 1+ edema in lower extremities chronic wounds on bilateral heels with dressings   DERMATOLOGIC - warm, dry, no rashes, no jaundice  PSYCHIATRIC - normal affect                LABS      Labs Reviewed - No data to display      Pertinent Labs & Imaging studies reviewed. (See chart for details)    RADIOLOGY  CT-HEAD W/O    (Results Pending)   CT-CSPINE WITHOUT PLUS RECONS    (Results Pending)             ED COURSE/PROCEDURES          Medications - No data to display            MEDICAL DECISION MAKING    Patient presented for evaluation of a reported mechanical fall.  Patient does have an underlying history of dementia, review of records reveal his baseline is ANO x1 which is his current mentation today.  He has a nonfocal exam and no specific tenderness.  CT head and neck were obtained which were negative for acute pathology was concerning for underlying rheumatoid arthritis though no acute fractures.  A thorough tertiary exam was performed and I was unable to elicit any additional bony tenderness or identify any additional injuries.  Given the patient is at his baseline mentation his imaging is negative today I do believe he is appropriate for transfer back to his care facility.        FINAL IMPRESSION  1.  Closed head injury   2.  Fall  3.  Dementia          Electronically signed by: Gonzalo Sauceda D.O., 8/1/2022 8:08 PM      Dictation Disclaimer  Please note this report has been produced using speech recognition software and  may contain errors related to that system, including errors seen in grammar,  punctuation and spelling, as well as words and phrases that may be inappropriate.  If there are any questions or concerns, please feel free to contact the dictating  physician for clarification.

## 2022-08-02 NOTE — ED TRIAGE NOTES
Chief Complaint   Patient presents with   • GLF     Pt BIB EMS from [facility] in [place]. Pt had an unwitnessed fall, takes a daily aspirin. Per EMS acting at baseline, with history of dementia.

## 2022-08-05 PROBLEM — Z97.8 FOLEY CATHETER IN PLACE: Status: ACTIVE | Noted: 2022-01-01

## 2022-08-14 PROBLEM — N39.0 UTI (URINARY TRACT INFECTION): Status: ACTIVE | Noted: 2022-01-01

## 2022-08-15 PROBLEM — K21.9 GERD (GASTROESOPHAGEAL REFLUX DISEASE): Status: ACTIVE | Noted: 2022-01-01

## 2022-08-15 PROBLEM — E87.6 HYPOKALEMIA: Status: ACTIVE | Noted: 2022-01-01

## 2022-08-15 NOTE — PROGRESS NOTES
[facility] Medicine Daily Progress Note    Date of Service  8/15/2022    Chief Complaint  Johann Davidson is a 88 y.o. male admitted 8/14/2022 with urinary catheter problem    Hospital Course  No notes on file    Interval Problem Update  - admitted overnight  - on zosyn for abnormal UA  - patient confused this am  - spoke to patient's son, he's said he does NOT have an allergy to cipro      I have discussed this patient's plan of care and discharge plan at IDT rounds today with Case Management, Nursing, Nursing leadership, and other members of the IDT team.    Consultants/Specialty  None    Code Status  DNAR/DNI    Disposition  Patient is not medically cleared for discharge.   Anticipate discharge to to skilled nursing facility.  I have placed the appropriate orders for post-discharge needs.    Review of Systems  Review of Systems   Unable to perform ROS: Dementia      Physical Exam  Temp:  [36.3 °C (97.4 °F)-36.8 °C (98.2 °F)] 36.3 °C (97.4 °F)  Pulse:  [] 80  Resp:  [14-20] 20  BP: (100-149)/(56-83) 106/63  SpO2:  [89 %-96 %] 93 %    Physical Exam  Constitutional:       General: He is not in acute distress.     Appearance: He is ill-appearing. He is not toxic-appearing or diaphoretic.   HENT:      Head: Normocephalic and atraumatic.      Nose: Nose normal.      Mouth/Throat:      Mouth: Mucous membranes are moist.   Eyes:      General: No scleral icterus.     Conjunctiva/sclera: Conjunctivae normal.   Cardiovascular:      Rate and Rhythm: Normal rate and regular rhythm.      Heart sounds: No murmur heard.    No friction rub. No gallop.   Pulmonary:      Effort: Pulmonary effort is normal.      Breath sounds: Normal breath sounds.   Abdominal:      General: Bowel sounds are normal. There is no distension.      Palpations: Abdomen is soft.      Tenderness: There is no abdominal tenderness.   Genitourinary:     Comments: Redd in place with dark yellow urine in tubing  Musculoskeletal:      Cervical back: Normal  range of motion.      Right lower leg: No edema.      Left lower leg: No edema.   Skin:     Coloration: Skin is not jaundiced.      Findings: No rash.      Comments: Scabbed wound to R heel   Neurological:      Mental Status: He is alert. He is disoriented.       Fluids    Intake/Output Summary (Last 24 hours) at 8/15/2022 1250  Last data filed at 8/15/2022 0820  Gross per 24 hour   Intake --   Output 300 ml   Net -300 ml       Laboratory  Recent Labs     08/14/22  1809 08/15/22  0303   WBC 10.1 9.7   RBC 4.29* 4.03*   HEMOGLOBIN 13.3* 12.6*   HEMATOCRIT 39.9* 36.8*   MCV 93.0 91.3   MCH 31.0 31.3   MCHC 33.3* 34.2   RDW 42.2 40.4   PLATELETCT 379 336   MPV 10.4 10.2     Recent Labs     08/14/22  1809 08/15/22  0303   SODIUM 138 139   POTASSIUM 3.5* 3.9   CHLORIDE 105 108   CO2 20 20   GLUCOSE 126* 128*   BUN 19 19   CREATININE 1.06 1.02   CALCIUM 9.0 8.7                   Imaging  No orders to display        Assessment/Plan  * UTI (urinary tract infection)- (present on admission)  Assessment & Plan  Recent urine culture positive for Pseudomonas, Enterococcus  Given a dose of cefepime in the ER, will change to IV Zosyn  Follow-up with repeat urine culture  Only catheter was replaced in ER  Follow-up with urology as outpatient    GERD (gastroesophageal reflux disease)  Assessment & Plan  Continue omeprazole    Hypokalemia  Assessment & Plan  Replacement ordered.    Urinary retention- (present on admission)  Assessment & Plan  Developed urinary retention at last hospitalization last month, started flomax, has urology referral (appt 8/17/22)  Continue with Redd catheter management  Continue flomax    Dementia (HCC)- (present on admission)  Assessment & Plan  History of Lewy body dementia per medical records.  Resume Seroquel, donepezil  Fall, aspiration precautions       VTE prophylaxis: SCDs/TEDs and enoxaparin ppx    I have performed a physical exam and reviewed and updated ROS and Plan today (8/15/2022). In review  of yesterday's note (8/14/2022), there are no changes except as documented above.

## 2022-08-15 NOTE — ED TRIAGE NOTES
Chief Complaint   Patient presents with    Urinary Catheter Problem     Chronic krueger. Removed at nursing home. Discharge around penis.      BIB EMS for drainage to penis when krueger removed. Patient has been less talkative and decreased appetite. Lives at skilled nursing. Has a chronic krueger.    BP (!) 149/63   Pulse (!) 102   Temp 36.8 °C (98.2 °F) (Temporal)   Resp 18   Wt 83.9 kg (185 lb)   SpO2 95%   BMI 25.80 kg/m²

## 2022-08-15 NOTE — ED NOTES
Rolled pt onto right side. Pillow remains between his legs to reduce pressure. Pt is yelling at this RN and assist RN. Attempts to redirect are unsuccessful. Pt attempts to punch and bite.

## 2022-08-15 NOTE — ASSESSMENT & PLAN NOTE
-History of Lewy body dementia per medical records.  -Resume Seroquel, donepezil  -Fall, aspiration precautions  - at baseline, A&O x1  - referral to hospice

## 2022-08-15 NOTE — ASSESSMENT & PLAN NOTE
-Recent urine culture positive for Pseudomonas, Enterococcus  -Given a dose of cefepime in the ER,changed to IV Zosyn; switched to Levofloxacin completed 8/21  -catheter was replaced in ER  - failed voiding trial, krueger order replaced

## 2022-08-15 NOTE — ASSESSMENT & PLAN NOTE
-Developed urinary retention at last hospitalization last month, started flomax, has urology referral (appt 8/17/22)  -voiding trial failed depsite multiple day attempt with additional of medication   - krueger replacement ordered   - can discuss suprapubic given pt discomfort however with hospice elected unsure if suprapubic is necessary   - avoid anticholinergics

## 2022-08-15 NOTE — ED NOTES
Med rec completed per Verbal MAR from [facility] via landline ([phone])  Allergies reviewed per MAR

## 2022-08-15 NOTE — PROGRESS NOTES
4 Eyes Skin Assessment Completed by Emiliano Kendall, INES and Ramila Garcia, INES.    Head WDL  Ears WDL  Nose WDL  Mouth WDL  Neck WDL  Breast/Chest WDL  Shoulder Blades WDL  Spine WDL  (R) Arm/Elbow/Hand Scab and Swelling  (L) Arm/Elbow/Hand Scab, Swelling, and Scar  Abdomen WDL  Groin WDL  Scrotum/Coccyx/Buttocks WDL  (R) Leg Redness, Scar, Scab, and Swelling rt knee scar  (L) Leg Redness  (R) Heel/Foot/Toe Scab  (L) Heel/Foot/Toe WDL          Devices In Places Pulse Ox      Interventions In Place Q2 Turns and Low Air Loss Mattress    Possible Skin Injury Yes    Pictures Uploaded Into Epic Yes  Wound Consult Placed Yes  RN Wound Prevention Protocol Ordered Yes

## 2022-08-15 NOTE — ED NOTES
Report received from Chloe CHACON.  Multiple RN and tech at bedside for krueger placement. During procedure a sore is noted to the tip of pts penis. Purulent drainage noted.   Catheter placed per policy.

## 2022-08-15 NOTE — H&P
Hospital Medicine History & Physical Note    Date of Service  8/14/2022    Primary Care Physician  Melanie Crawley P.A.-C.      Code Status  DNAR/DNI    Chief Complaint  Chief Complaint   Patient presents with    Urinary Catheter Problem     Chronic krueger. Removed at nursing home. Discharge around penis.        History of Presenting Illness  Johann Davidson is a 88 y.o. male with past medical history of Lewy body dementia, oriented to himself at baseline, chronic urinary retention, indwelling Krueger, type 2 diabetes, GERD, hypertension, who presented 8/14/2022 with concern for purulent discharge around the Krueger catheter.  Krueger catheter was removed at [facility] today, where patient came from.  Unable to obtain further history, as patient is altered, answers are incoherent.  Cognitive baseline is unknown.  Family is not at bedside.  Noted that he was seen in this emergency department on 8/1 after ground-level fall and head injury.  Before that he was hospitalized on 7/24-7/31, when he was treated for UTI, with urine culture positive for Pseudomonas and Enterococcus, treated with Zosyn.  Noted that patient has history of allergy to ciprofloxacin.  This time Krueger was replaced in ER.  Small ulceration without surrounding erythema on the foreskin noted.  UA came back positive for infection.  Patient was given cefepime in the ER.  We will change to Zosyn.    I discussed the plan of care with bedside RN.    Review of Systems  Review of Systems   Unable to perform ROS: Dementia     Past Medical History   has a past medical history of Arthritis, Benign prostate hyperplasia, Constipation, Dementia (HCC), Dementia (HCC), Dizziness, GERD without esophagitis, Hearing loss, Memory loss, Secondary hypertension, and UTI (urinary tract infection).    Surgical History   has a past surgical history that includes plastic surgery (Right, 03/17/2022) and cataract extraction with iol (Bilateral, 2020).     Family History     Family history reviewed  with patient. There is no family history that is pertinent to the chief complaint.     Social History   reports that he quit smoking about 37 years ago. His smoking use included cigarettes. He has never used smokeless tobacco. He reports that he does not currently use alcohol. He reports that he does not use drugs.    Allergies  Allergies   Allergen Reactions    Ciprofloxacin      Per MAR from [place]     Lidocaine      Per MAR from [place]     Tetanus-Diphth-Acell Pertussis        Medications  Prior to Admission Medications   Prescriptions Last Dose Informant Patient Reported? Taking?   QUEtiapine (SEROQUEL) 25 MG Tab  Family Member Yes No   Sig: Take 25 mg by mouth at bedtime as needed. Take one tablet nightly as needed   Vitamins A & D (VITAMIN A & D) Ointment  Family Member Yes No   Sig: Apply 1 Application topically every day.   aspirin (ASA) 81 MG Chew Tab chewable tablet  Family Member Yes No   Sig: Chew 81 mg every day. Take one tablet by mouth once a day   atorvastatin (LIPITOR) 10 MG Tab  Family Member Yes No   Sig: Take 10 mg by mouth every day. Take 1 tablet by mouth once a day   donepezil (ARICEPT) 5 MG Tab  Family Member Yes No   Sig: Take 5 mg by mouth every day. Take one tablet once a day   omeprazole (PRILOSEC) 20 MG delayed-release capsule  Family Member Yes No   Sig: Take 20 mg by mouth every day. Take one capsule by mouth daily   senna-docusate (PERICOLACE OR SENOKOT S) 8.6-50 MG Tab  Family Member Yes No   Sig: Take 1 Tablet by mouth 1 time a day as needed (Take one tablet as needed once a day for constipation). Take one tablet as needed once a day for constipation  Indications: Constipation   tamsulosin (FLOMAX) 0.4 MG capsule  Family Member No No   Sig: Take 1 Capsule by mouth 1/2 hour after breakfast.   Patient taking differently: Take 0.4 mg by mouth every day. Take one tablet once a day   vitamin B-12 CR (CYANOCOBALAMIN) 1000 MCG Tab CR tablet  Family Member Yes No   Sig: Take 1,000 mcg  by mouth every day. Take one tablet by mouth daily      Facility-Administered Medications: None       Physical Exam  Temp:  [36.8 °C (98.2 °F)] 36.8 °C (98.2 °F)  Pulse:  [102] 102  Resp:  [18] 18  BP: (149)/(63) 149/63  SpO2:  [95 %] 95 %  Blood Pressure : (!) 149/63   Temperature: 36.8 °C (98.2 °F)   Pulse: (!) 102   Respiration: 18   Pulse Oximetry: 95 %       Physical Exam  Vitals and nursing note reviewed.   Constitutional:       General: He is not in acute distress.  HENT:      Head: Normocephalic and atraumatic.      Nose: Nose normal.      Mouth/Throat:      Mouth: Mucous membranes are moist.   Eyes:      Extraocular Movements: Extraocular movements intact.      Pupils: Pupils are equal, round, and reactive to light.   Cardiovascular:      Rate and Rhythm: Normal rate and regular rhythm.   Pulmonary:      Effort: Pulmonary effort is normal.      Breath sounds: Normal breath sounds.   Abdominal:      General: Abdomen is flat. There is no distension.      Tenderness: There is no abdominal tenderness.   Genitourinary:     Comments: Small ulceration on the foreskin  Indwelling Redd catheter with straw-colored urine  Musculoskeletal:         General: No swelling or deformity. Normal range of motion.      Cervical back: Normal range of motion and neck supple.   Skin:     General: Skin is warm and dry.   Neurological:      General: No focal deficit present.      Mental Status: He is alert. He is disoriented and confused.      Comments: Moves all 4 extremities.  Verbal answers are incoherent, unable to assess orientation   Psychiatric:         Mood and Affect: Mood normal.         Behavior: Behavior normal.       Laboratory:  Recent Labs     08/14/22  1809   WBC 10.1   RBC 4.29*   HEMOGLOBIN 13.3*   HEMATOCRIT 39.9*   MCV 93.0   MCH 31.0   MCHC 33.3*   RDW 42.2   PLATELETCT 379   MPV 10.4     Recent Labs     08/14/22  1809   SODIUM 138   POTASSIUM 3.5*   CHLORIDE 105   CO2 20   GLUCOSE 126*   BUN 19   CREATININE  1.06   CALCIUM 9.0     Recent Labs     08/14/22  1809   GLUCOSE 126*         No results for input(s): NTPROBNP in the last 72 hours.      No results for input(s): TROPONINT in the last 72 hours.    Imaging:  No orders to display           Assessment/Plan:  Justification for Admission Status  I anticipate this patient is appropriate for observation status at this time because UTI    Patient will need a Med/Surg bed on MEDICAL service .  The need is secondary to UTI.    * UTI (urinary tract infection)- (present on admission)  Assessment & Plan  Recent urine culture positive for Pseudomonas, Enterococcus  Given a dose of cefepime in the ER, will change to IV Zosyn  Follow-up with repeat urine culture  Only catheter was replaced in ER  Follow-up with urology as outpatient    Hypokalemia  Assessment & Plan  Replacement ordered.    Urinary retention- (present on admission)  Assessment & Plan  Chronic.  Continue with Redd catheter management    Dementia (HCC)- (present on admission)  Assessment & Plan  History of Lewy body dementia per medical records.  Resume Seroquel, donepezil  Fall, aspiration precautions      VTE prophylaxis: enoxaparin ppx

## 2022-08-15 NOTE — PROGRESS NOTES
Tad Stuart, son and POA, informed Rn that Cipro allergy is incorrect. Pt  has been taking cipro for UTIs. 2 years ago pt has been getting UTI every 6 months (August 2020).  2 months ago, pt was able to sit up in a wheelchair - Has been bedridden since.   November 2021 had a procedure on right knee.     Son rubbing pt's back, appears to soothe pt.  Also assisting with pt drinking water and encouraging oral intake; unfortunately pt has been having less oral food intake at nursing home; son goes there to ensure he eats some % of his meals.    Son is requesting if pt can be placed in a skilled nursing facility instead of nursing home so pt can be better taken care of, prevent bed sores etc.     Add: yellow penile discharge noted. Turned side to side q2 hrs as tolerated.     Prefers thin liquids tolerates very well with straw

## 2022-08-15 NOTE — ED PROVIDER NOTES
"ED Provider Note    Scribed for Bud Ceja M.D. by Adelina Meza. 2022  6:37 PM    Primary care provider: Melanie Crawley P.A.-C.  Means of arrival: EMS  History obtained from: Patient  History limited by: Dementia    CHIEF COMPLAINT  Chief Complaint   Patient presents with    Urinary Catheter Problem     Chronic krueger. Removed at nursing home. Discharge around penis.        HPI  Johann Davidson is a 88 y.o. male who presents to the Emergency Department via EMS to bedside for urinary catheter issues. Patient uses a Krueger catheter at baseline. When it was removed today, at his dementia care home nurse noted abnormal discharge around his penis. He is rather demented, and not very communicative at this time. He complains that \"it hurts\", but does not specify what hurts. He denies any abdominal pain, nausea, vomiting, fever, sores, or back pain. He has no known diagnosis of Diabetes.     HPI limited due to patient's history of Dementia.    REVIEW OF SYSTEMS  Pertinent positives include: penile discharge.  Pertinent negatives include: abdominal pain, nausea, vomiting, fever, sores, or back pain.  ROS limited due to patient's history of Dementia.    PAST MEDICAL HISTORY  Past Medical History:   Diagnosis Date    Arthritis     Benign prostate hyperplasia     Constipation     Dementia (HCC)     Dementia (HCC)     Dizziness     GERD without esophagitis     Hearing loss     Memory loss     Secondary hypertension     UTI (urinary tract infection)          SOCIAL HISTORY  Social History     Tobacco Use    Smoking status: Former     Types: Cigarettes     Quit date: 1985     Years since quittin.1    Smokeless tobacco: Never   Vaping Use    Vaping Use: Never used   Substance Use Topics    Alcohol use: Not Currently    Drug use: Never     Social History     Substance and Sexual Activity   Drug Use Never       SURGICAL HISTORY  Past Surgical History:   Procedure Laterality Date    PLASTIC SURGERY Right 2022    RT " back of RLE    CATARACT EXTRACTION WITH IOL Bilateral 2020       CURRENT MEDICATIONS  Current Outpatient Medications   Medication Instructions    aspirin (ASA) 81 mg, Oral, DAILY, Take one tablet by mouth once a day    atorvastatin (LIPITOR) 10 mg, Oral, DAILY, Take 1 tablet by mouth once a day    donepezil (ARICEPT) 5 mg, Oral, DAILY, Take one tablet once a day    omeprazole (PRILOSEC) 20 mg, Oral, DAILY, Take one capsule by mouth daily    QUEtiapine (SEROQUEL) 25 mg, Oral, NIGHTLY PRN, Take one tablet nightly as needed    senna-docusate (PERICOLACE OR SENOKOT S) 8.6-50 MG Tab 1 Tablet, Oral, 1 TIME DAILY PRN, Take one tablet as needed once a day for constipation    tamsulosin (FLOMAX) 0.4 mg, Oral, AFTER BREAKFAST    vitamin B-12 CR (CYANOCOBALAMIN) 1,000 mcg, Oral, DAILY, Take one tablet by mouth daily    Vitamins A & D (VITAMIN A & D) Ointment 1 Application, Apply externally, DAILY       ALLERGIES  Allergies   Allergen Reactions    Ciprofloxacin      Per MAR from [place]     Lidocaine      Per MAR from [place]     Tetanus-Diphth-Acell Pertussis        PHYSICAL EXAM  VITAL SIGNS: BP (!) 149/63   Pulse (!) 102   Temp 36.8 °C (98.2 °F) (Temporal)   Resp 18   Wt 83.9 kg (185 lb)   SpO2 95%   BMI 25.80 kg/m²   Reviewed and hypertensive, tachycardic, afebrile  Constitutional: Lying on his right side in fetal position. Mumbles answers to some question  HENT: Normocephalic, atraumatic, bilateral external ears normal, wearing a mask.   Eyes: PERRLA, conjunctiva pink, no scleral icterus.   Cardiovascular: Regular rate and rhythm. No murmurs, rubs or gallops.  No dependent edema or calf tenderness  Respiratory: Lungs clear to auscultation bilaterally. No wheezes, rales, or rhonchi.  Abdominal:  No flank tenderness. Abdomen soft, non-tender, non distended. No rebound, or guarding.    Skin: small 5 cm stage 1 decubitus on the right gluteus probably from rubbing against a bed rail. No erythema, no rash.    Genitourinary: No obvious discharge. Uncircumcised.  Small erosion on tip of penis without edema or purulence.  No costovertebral angle tenderness.   Musculoskeletal: no deformities.   Neurologic: Awake, cranial nerves 2-12 intact by passive exam.  Moves all extremities with symmetric strength no focal deficit noted.  Psychiatric: Quite severe dementia    DIFFERENTIAL DIAGNOSIS:  UTI  Catheter associated UTI  Decubiti  Penile wound or infection  Dementia  Doubt sepsis    LABORATORY:  Results for orders placed or performed during the hospital encounter of 08/14/22   CBC WITH DIFFERENTIAL   Result Value Ref Range    WBC 10.1 4.8 - 10.8 K/uL    RBC 4.29 (L) 4.70 - 6.10 M/uL    Hemoglobin 13.3 (L) 14.0 - 18.0 g/dL    Hematocrit 39.9 (L) 42.0 - 52.0 %    MCV 93.0 81.4 - 97.8 fL    MCH 31.0 27.0 - 33.0 pg    MCHC 33.3 (L) 33.7 - 35.3 g/dL    RDW 42.2 35.9 - 50.0 fL    Platelet Count 379 164 - 446 K/uL    MPV 10.4 9.0 - 12.9 fL    Neutrophils-Polys 71.60 44.00 - 72.00 %    Lymphocytes 15.10 (L) 22.00 - 41.00 %    Monocytes 10.60 0.00 - 13.40 %    Eosinophils 1.50 0.00 - 6.90 %    Basophils 0.30 0.00 - 1.80 %    Immature Granulocytes 0.90 0.00 - 0.90 %    Nucleated RBC 0.00 /100 WBC    Neutrophils (Absolute) 7.24 1.82 - 7.42 K/uL    Lymphs (Absolute) 1.53 1.00 - 4.80 K/uL    Monos (Absolute) 1.07 (H) 0.00 - 0.85 K/uL    Eos (Absolute) 0.15 0.00 - 0.51 K/uL    Baso (Absolute) 0.03 0.00 - 0.12 K/uL    Immature Granulocytes (abs) 0.09 0.00 - 0.11 K/uL    NRBC (Absolute) 0.00 K/uL   Basic Metabolic Panel   Result Value Ref Range    Sodium 138 135 - 145 mmol/L    Potassium 3.5 (L) 3.6 - 5.5 mmol/L    Chloride 105 96 - 112 mmol/L    Co2 20 20 - 33 mmol/L    Glucose 126 (H) 65 - 99 mg/dL    Bun 19 8 - 22 mg/dL    Creatinine 1.06 0.50 - 1.40 mg/dL    Calcium 9.0 8.5 - 10.5 mg/dL    Anion Gap 13.0 7.0 - 16.0   URINALYSIS    Specimen: Urine   Result Value Ref Range    Color Yellow     Character Turbid (A)     Specific Gravity 1.023  <1.035    Ph 6.0 5.0 - 8.0    Glucose Negative Negative mg/dL    Ketones Negative Negative mg/dL    Protein 30 (A) Negative mg/dL    Bilirubin Negative Negative    Urobilinogen, Urine 1.0 Negative    Nitrite Positive (A) Negative    Leukocyte Esterase Large (A) Negative    Occult Blood Moderate (A) Negative    Micro Urine Req Microscopic    Blood Culture,Hold   Result Value Ref Range    Blood Culture Hold Collected    ESTIMATED GFR   Result Value Ref Range    GFR (CKD-EPI) 67 >60 mL/min/1.73 m 2   URINE MICROSCOPIC (W/UA)   Result Value Ref Range    WBC Packed (A) /hpf    RBC 10-20 (A) /hpf    Bacteria Few (A) None /hpf    Epithelial Cells Many (A) /hpf   Lab results reviewed by me.     INTERVENTIONS:  Medications   nitrofurantoin (MACROBID) capsule 100 mg (has no administration in time range)   cefepime (Maxipime) 2 g in dextrose 5% 20 mL IV syringe (has no administration in time range)     Response:mildly improved.    ED COURSE:  Nursing notes, VS, PMSFHx reviewed in chart. Urine was found to be sensitive to pseudomonas and enterococcus during most recent visit.     6:37 PM - Patient seen and examined at bedside. Ordered CBC with diff, Basic metabolic panel, UA, Urine culture, Blood culture to evaluate.     8:00 PM- Redd catheter was placed at this time successfully. He has a sore/erosion to the foreskin, which I assume is causing discharge. He also has a small 1 cm decubitus on the right gluteus probably from rubbing against a bed rail.     8:51 PM- Patient's culture is sensitive to nitrococcus and Cipro at this time. Urine looks infected tonight.     9:07 PM- Spoke with pharmacy at this time regarding the best antibiotic regimen for the patient. They recommended Levaquin and Nitrofurantoin.     9:09 PM- Patient has a Cipro allergy. Pharmacy will find out what it is. If he can not tolerated Antibiotics above he will be kept for IV antibiotic infusion.     10:19 PM- Patient is allergic to the only oral antibiotic  that he could be discharged home with. Patient will be admitted to further IV antibiotic infusion. Patient verbalizes understanding and agreement to this plan of care.     10:27 PM-  I discussed the patient's case and the above findings with Dr. Del Toro (hospitalist) who agrees to hospitalize the patient and take over the plan of care moving forward.     DISPOSITION:  Patient will be hospitalized by Dr. Del Toro     MEDICAL DECISION MAKING:  This patient presents with a purulent discharge on the penis when staff at his care home were replacing his Redd.  This may have been exudate from a small penile wound that was likely a pressure wound from the catheter.  The patient also has evidence of a catheter associated UTI and based on urine culture here from July 27 he has Pseudomonas and Enterococcus in the urine.  Unfortunately he is allergic to fluoroquinolones which were the only oral agent which could treat Pseudomonas.  I called his son to ask what his allergic reaction was and whether me may give him a test dose and monitor him for allergy.  I obtained a response from the sun so I will admit the patient on IV antibiotics and hopefully the family can be consulted tomorrow by social work and a determination can be made whether he can indeed tolerate fluoroquinolones.  There is no evidence of sepsis.    CONDITION: guarded.     FINAL IMPRESSION  1. Urinary tract infection associated with indwelling urethral catheter, initial encounter ([facility])    2. Pseudomonas aeruginosa infection    3. Enterococcus faecalis infection    4. Dementia without behavioral disturbance, unspecified dementia type ([facility])    5. Open wound of penis, initial encounter          Adelina FERNANDO (Vito), am scribing for, and in the presence of, Bud Ceja M.D..    Electronically signed by: Adelina Mata), 8/14/2022    Bud FERNANDO M.D. personally performed the services described in this documentation, as scribed by Adelina  Derrick in my presence, and it is both accurate and complete.    The note accurately reflects work and decisions made by me.  Bud Ceja M.D.  8/14/2022  11:16 PM

## 2022-08-16 NOTE — PROGRESS NOTES
Full asisstance with feeding, pt upright; ate 80% of breakfast. Repositioned. Reinstructed not to pull on iv and krueger.

## 2022-08-16 NOTE — DISCHARGE PLANNING
Care Transition Team Assessment    Spoke with son Tad via telephone. Patient has been living @ [facility] and having [facility] that just started. PCP Melanie Camarena. Uses W/C to get around. Has Medicare and Blue Cross insurance. Anticipate SNF for therapies at D/C.      Information Source  Orientation Level: Oriented to person  Informant's Name: Tad Davidson    Readmission Evaluation  Is this a readmission?: No  Ambulatory or Self Mobile in Wheelchair: No-Not an Elopement Risk    Interdisciplinary Discharge Planning  Primary Care Physician: Melanie Camarena  Lives with - Patient's Self Care Capacity: Other (Comments) (Assited living with Memory Care)  Patient or legal guardian wants to designate a caregiver: No (Son is POA, in nursing home)  Support Systems: Children  Housing / Facility: Other (Comments) (Memory Care)  Name of Care Facility: [facility]  Able to Return to Previous ADL's: Future Time w/Therapy  Mobility Issues: Yes  Assistance Needed: Yes  Durable Medical Equipment: Other - Specify (Wheel Chair)  DME Provider / Phone: wheelchair    Discharge Preparedness  What are your discharge supports?: Child  Prior Functional Level: Uses Wheelchair    Functional Assesment  Prior Functional Level: Uses Wheelchair    Finances  Prescription Coverage: Yes    Discharge Risks or Barriers  Patient risk factors: Vulnerable adult, Complex medical needs    Anticipated Discharge Information  Discharge Disposition: D/T to SNF with Medicare cert in anticipation of skilled care (03)  Discharge Contact Phone Number: [phone]
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
## 2022-08-16 NOTE — DISCHARGE PLANNING
Called and spoke with patients ammy Mishra patient son and patient has had Covid vaccine and Booster on 01/22/21, 02/12/21 and 10/08/21.

## 2022-08-16 NOTE — PROGRESS NOTES
[facility] Medicine Daily Progress Note    Date of Service  8/16/2022    Chief Complaint  Johann Davidson is a 88 y.o. male admitted 8/14/2022 with urinary catheter problem    Hospital Course  Mr. Johann Davidson is an 88-year-old male with a PMHx of Lewy body dementia, chronic urinary retention with indwelling Redd catheter, DMT2, GERD, HTN, who presented on 8/14/2022 from [facility] due to concerns of purulent discharge around chronic Redd catheter.    Patient is only oriented to self.  Patient has been residing at [facility] due to significant history of dementia.  Patient was brought to the ER due to concerns of purulent discharge around Redd catheter.  Redd catheter was removed at [facility].  Admitting provider was unable to obtain history as patient was altered and was incoherent.  Cognitive baseline on admission was unknown.  Family was not at bedside.  Also noted that patient was in the ER on 8/1 after a ground-level fall and a head injury.  Other recent hospitalization indicated on 7/20 [birth year]/1931, patient was treated for UTI with urine culture positive for Pseudomonas and Enterococcus and treated with Zosyn.  Patient also noted to have a history of allergy to ciprofloxacin, but this was verified by a family member over the phone that he is NOT allergic to ciprofloxacin.    In ER, patient's Redd catheter was replaced.  Also noted a small ulceration without surrounding erythema on the foreskin was noted.  Wound team was consulted.  Urine analysis came back positive for infection.  Patient was given cefepime in ER and switched to Zosyn thereafter.      Interval Problem Update  Patient seen and examined.  Patient resting comfortably in bed.  Attempted to discuss plan of care with patient, but only oriented to self.  Patient denies pain at this time.  -Plan of care: Continue to monitor patient; continue IV ABX for treatment of UTI; pending wound team for possible skin breakdown around  Redd  -Disposition: Anticipated to stay 2-3 midnights for IV ABX treatment and urine culture results and sensitivity; will need to go back to memory center  -Lab work: Reviewed; expected  -VSS at this time    I have discussed this patient's plan of care and discharge plan at IDT rounds today with Case Management, Nursing, Nursing leadership, and other members of the IDT team.    Consultants/Specialty  NONE    Code Status  DNAR/DNI    Disposition  Patient is not medically cleared for discharge.   Anticipate discharge to to home with close outpatient follow-up.  I have placed the appropriate orders for post-discharge needs.    Review of Systems  Review of Systems   Unable to perform ROS: Dementia      Physical Exam  Temp:  [36.1 °C (96.9 °F)-37.1 °C (98.7 °F)] 37.1 °C (98.7 °F)  Pulse:  [56-96] 89  Resp:  [16-20] 20  BP: (100-148)/(61-69) 114/61  SpO2:  [88 %-97 %] 93 %    Physical Exam  Vitals and nursing note reviewed.   HENT:      Head: Normocephalic.      Nose: Nose normal.      Mouth/Throat:      Mouth: Mucous membranes are moist.      Pharynx: Oropharynx is clear.   Eyes:      Pupils: Pupils are equal, round, and reactive to light.   Cardiovascular:      Rate and Rhythm: Normal rate and regular rhythm.      Pulses: Normal pulses.      Heart sounds: Normal heart sounds.   Pulmonary:      Effort: Pulmonary effort is normal.      Breath sounds: Normal breath sounds.   Abdominal:      General: Bowel sounds are normal.      Palpations: Abdomen is soft.   Musculoskeletal:         General: Tenderness present.      Cervical back: Normal range of motion and neck supple.   Skin:     Capillary Refill: Capillary refill takes 2 to 3 seconds.   Neurological:      Mental Status: He is alert. Mental status is at baseline.       Fluids    Intake/Output Summary (Last 24 hours) at 8/16/2022 1227  Last data filed at 8/16/2022 1017  Gross per 24 hour   Intake 1881.25 ml   Output 600 ml   Net 1281.25 ml       Laboratory  Recent Labs      08/14/22  1809 08/15/22  0303   WBC 10.1 9.7   RBC 4.29* 4.03*   HEMOGLOBIN 13.3* 12.6*   HEMATOCRIT 39.9* 36.8*   MCV 93.0 91.3   MCH 31.0 31.3   MCHC 33.3* 34.2   RDW 42.2 40.4   PLATELETCT 379 336   MPV 10.4 10.2     Recent Labs     08/14/22  1809 08/15/22  0303   SODIUM 138 139   POTASSIUM 3.5* 3.9   CHLORIDE 105 108   CO2 20 20   GLUCOSE 126* 128*   BUN 19 19   CREATININE 1.06 1.02   CALCIUM 9.0 8.7                   Imaging  No orders to display        Assessment/Plan  * UTI (urinary tract infection)- (present on admission)  Assessment & Plan  -Recent urine culture positive for Pseudomonas, Enterococcus  -Given a dose of cefepime in the ER, will change to IV Zosyn  -Follow-up with repeat urine culture  -Only catheter was replaced in ER  -Follow-up with urology as outpatient    GERD (gastroesophageal reflux disease)  Assessment & Plan  Continue omeprazole    Hypokalemia  Assessment & Plan  -Replacement ordered.    Urinary retention- (present on admission)  Assessment & Plan  -Developed urinary retention at last hospitalization last month, started flomax, has urology referral (appt 8/17/22)  -Continue with Redd catheter management  -Continue flomax    Dementia (HCC)- (present on admission)  Assessment & Plan  -History of Lewy body dementia per medical records.  -Resume Seroquel, donepezil  -Fall, aspiration precautions       VTE prophylaxis: SCDs/TEDs    I have performed a physical exam and reviewed and updated ROS and Plan today (8/16/2022). In review of yesterday's note (8/15/2022), there are no changes except as documented above.    ========================================================================================================  Please note that this dictation was created using voice recognition software. I have made every reasonable attempt to correct obvious errors, but there may be errors of grammar and possibly content that I did not discover before finalizing the note.    Electronically  signed by:  KALYANI Sanchez, MSN, APRN, FNP-C  Hospitalist Services  [facility]  [phone]  [email or web address]  08/16/22                    3058

## 2022-08-16 NOTE — PROGRESS NOTES
Spoke with son, Tad Davidson gave verbal consent for covid vaccine booster; witnessed by Ramila Garcia.

## 2022-08-16 NOTE — HOSPITAL COURSE
Mr. Johann Davidson is an 88-year-old male with a PMHx of Lewy body dementia, chronic urinary retention with indwelling Redd catheter, DMT2, GERD, HTN, who presented on 8/14/2022 from [facility] due to concerns of purulent discharge around chronic Redd catheter.    Patient is only oriented to self.  Patient has been residing at [facility] due to significant history of dementia.  Patient was brought to the ER due to concerns of purulent discharge around Redd catheter.  Redd catheter was removed at [facility].  Admitting provider was unable to obtain history as patient was altered and was incoherent.  Cognitive baseline on admission was unknown.  Family was not at bedside.  Also noted that patient was in the ER on 8/1 after a ground-level fall and a head injury.  Other recent hospitalization indicated on 7/24-31/2022, patient was treated for UTI with urine culture positive for Pseudomonas and Enterococcus and treated with Zosyn.  Patient also noted to have a history of allergy to ciprofloxacin, but this was verified by a family member over the phone that he is NOT allergic to ciprofloxacin.    In ER, patient's Redd catheter was replaced.  Also noted a small ulceration without surrounding erythema on the foreskin was noted.  Wound team was consulted.  Urine analysis came back positive for infection.  Patient was given cefepime in ER and switched to Zosyn thereafter. Cultures growing pseudomonas sensitive to levofloxacin thus he was switched to oral regiment. Discharge back to [facility] was planned however his screening COVID returned positive thus he was not accepted and requires 10 days of quarantine prior to acceptance back.     Discharge to group home with hospice on Monday

## 2022-08-16 NOTE — DISCHARGE PLANNING
Received Choice form at 1357  [facility] Name[facility]  Referral sent per Choice form @ 1409    Choice saved in .      @1423-  Agency/Facility Name: [facility]   Spoke To: Mariajose  Outcome: Mariajose called to see if pt's COVID vaccination info is available. Per Mariajose they only take fully vaccinated patients.     @1440-  [facility]: [facility]   Outcome: Left a voicemail for [facility] notifying that per RN HOA Martinez pt was vaccinated but will likely need booster. Offered to send vaccine cards when able.

## 2022-08-16 NOTE — PROGRESS NOTES
"Patient states \"I am sad and a feel like I'm going to die.\" After this statement patient heart rate fell from 86 to 56 O2 saturation fell to 76% then sensor stopped reading. Patient immediately came back to 86 heart rate and saturations returned to normal.    Patient confused, rambling statements    SpO2 sensor will not consistently read on multiple fingers and earlobe, low signal quality that cuts in and out constantly.  "

## 2022-08-16 NOTE — THERAPY
Physical Therapy Contact Note    PT evaluation order received and chart reviewed. Pt admitted with skin breakdown around krueger catheter. Per chart, pt is A&Ox1 at baseline due to dementia and resides at a memory care facility. Pt has reportedly been wheelchair bound versus bedridden for the last few months since an ankle surgery. Attempted to see pt for PT evaluation with pt difficult to arouse and laying in a fetal position. Pt was evaluated on 7/26 and max Ax2 for sit<>stand and returned to memory care facility on discharge. Discussed with RN, who reports when pt is awake he is A&Ox1 and at mental baseline. Given pt's history detailed above, a skilled acute PT evaluation is not indicated given no acute change in mobility status. Recommend return to memory care facility if able to provide care at current functional level of mobility, otherwise, recommend placement.    Chasity Boyd, PT, DPT

## 2022-08-16 NOTE — CARE PLAN
The patient is Stable - Low risk of patient condition declining or worsening    Shift Goals  Clinical Goals: iv antibiotics, ivf, encourage oral intake, offload and turn q2, maintain krueger, hygiene  Patient Goals: rest  Family Goals: not present    Progress made toward(s) clinical / shift goals:  tolerate turning/repositioning fairly well

## 2022-08-16 NOTE — CARE PLAN
The patient is Stable - Low risk of patient condition declining or worsening    Shift Goals  Clinical Goals: identify infection, treat infection  Patient Goals: rest, comfort  Family Goals: take cipro off allergy list    Progress made toward(s) clinical / shift goals: progressing    Patient is not progressing towards the following goals:

## 2022-08-16 NOTE — DISCHARGE PLANNING
Received request for SNF. Called and spoke with patients son Tad. Discussed SNF choices. Received verbal consent from Tad for SNF. SNF choice filled out. Choice form faxed to [facility]. Message sent to [facility] to update.

## 2022-08-17 PROBLEM — U07.1 COVID-19: Status: ACTIVE | Noted: 2022-01-01

## 2022-08-17 PROBLEM — E87.6 HYPOKALEMIA: Status: RESOLVED | Noted: 2022-01-01 | Resolved: 2022-01-01

## 2022-08-17 NOTE — PROGRESS NOTES
NOC HOSPITALIST CROSS COVER    Notified by RN regarding infiltration of IV fluid and potassium. RN attempted aspiration at the recommendation of pharmacy. Picture of forearm available in the media tab. Per RN the forearm is not taught and the patient is not complaining of pain. At this time the patient has a potassium of 4.4 and has good PO intake. Fluids will be held at this time.                -----------------------------------------------------------------------------------------------------------    Electronically signed by:  JOZEF Hall PA-C  Hospitalist Services

## 2022-08-17 NOTE — DISCHARGE INSTRUCTIONS
Discharge Instructions    Discharged to [facility] home by medical transportation with escort. Discharged via ambulance, hospital escort: Yes.  Special equipment needed: Not Applicable    Be sure to schedule a follow-up appointment with your primary care doctor or any specialists as instructed.     Discharge Plan:        I understand that a diet low in cholesterol, fat, and sodium is recommended for good health. Unless I have been given specific instructions below for another diet, I accept this instruction as my diet prescription.       Special Instructions: None    -Is this patient being discharged with medication to prevent blood clots?  No    Is patient discharged on Warfarin / Coumadin?   No     Urinary Tract Infection, Adult  A urinary tract infection (UTI) is an infection of any part of the urinary tract. The urinary tract includes:  The kidneys.  The ureters.  The bladder.  The urethra.  These organs make, store, and get rid of pee (urine) in the body.  What are the causes?  This is caused by germs (bacteria) in your genital area. These germs grow and cause swelling (inflammation) of your urinary tract.  What increases the risk?  You are more likely to develop this condition if:  You have a small, thin tube (catheter) to drain pee.  You cannot control when you pee or poop (incontinence).  You are female, and:  You use these methods to prevent pregnancy:  A medicine that kills sperm (spermicide).  A device that blocks sperm (diaphragm).  You have low levels of a female hormone (estrogen).  You are pregnant.  You have genes that add to your risk.  You are sexually active.  You take antibiotic medicines.  You have trouble peeing because of:  A prostate that is bigger than normal, if you are male.  A blockage in the part of your body that drains pee from the bladder (urethra).  A kidney stone.  A nerve condition that affects your bladder (neurogenic bladder).  Not getting enough to drink.  Not peeing often  enough.  You have other conditions, such as:  Diabetes.  A weak disease-fighting system (immune system).  Sickle cell disease.  Gout.  Injury of the spine.  What are the signs or symptoms?  Symptoms of this condition include:  Needing to pee right away (urgently).  Peeing often.  Peeing small amounts often.  Pain or burning when peeing.  Blood in the pee.  Pee that smells bad or not like normal.  Trouble peeing.  Pee that is cloudy.  Fluid coming from the vagina, if you are female.  Pain in the belly or lower back.  Other symptoms include:  Throwing up (vomiting).  No urge to eat.  Feeling mixed up (confused).  Being tired and grouchy (irritable).  A fever.  Watery poop (diarrhea).  How is this treated?  This condition may be treated with:  Antibiotic medicine.  Other medicines.  Drinking enough water.  Follow these instructions at home:    Medicines  Take over-the-counter and prescription medicines only as told by your doctor.  If you were prescribed an antibiotic medicine, take it as told by your doctor. Do not stop taking it even if you start to feel better.  General instructions  Make sure you:  Pee until your bladder is empty.  Do not hold pee for a long time.  Empty your bladder after sex.  Wipe from front to back after pooping if you are a female. Use each tissue one time when you wipe.  Drink enough fluid to keep your pee pale yellow.  Keep all follow-up visits as told by your doctor. This is important.  Contact a doctor if:  You do not get better after 1-2 days.  Your symptoms go away and then come back.  Get help right away if:  You have very bad back pain.  You have very bad pain in your lower belly.  You have a fever.  You are sick to your stomach (nauseous).  You are throwing up.  Summary  A urinary tract infection (UTI) is an infection of any part of the urinary tract.  This condition is caused by germs in your genital area.  There are many risk factors for a UTI. These include having a small, thin  tube to drain pee and not being able to control when you pee or poop.  Treatment includes antibiotic medicines for germs.  Drink enough fluid to keep your pee pale yellow.  This information is not intended to replace advice given to you by your health care provider. Make sure you discuss any questions you have with your health care provider.  Document Released: 06/05/2009 Document Revised: 12/05/2019 Document Reviewed: 06/27/2019  [employer] Patient Education © 2020 [employer]      Indwelling Urinary Catheter Care, Adult  An indwelling urinary catheter is a thin tube that is put into your bladder. The tube helps to drain pee (urine) out of your body. The tube goes in through your urethra. Your urethra is where pee comes out of your body. Your pee will come out through the catheter, then it will go into a bag (drainage bag).  Take good care of your catheter so it will work well.  How to wear your catheter and bag  Supplies needed  Sticky tape (adhesive tape) or a leg strap.  Alcohol wipe or soap and water (if you use tape).  A clean towel (if you use tape).  Large overnight bag.  Smaller bag (leg bag).  Wearing your catheter  Attach your catheter to your leg with tape or a leg strap.  Make sure the catheter is not pulled tight.  If a leg strap gets wet, take it off and put on a dry strap.  If you use tape to hold the bag on your leg:  Use an alcohol wipe or soap and water to wash your skin where the tape made it sticky before.  Use a clean towel to pat-dry that skin.  Use new tape to make the bag stay on your leg.  Wearing your bags  You should have been given a large overnight bag.  You may wear the overnight bag in the day or night.  Always have the overnight bag lower than your bladder.  Do not let the bag touch the floor.  Before you go to sleep, put a clean plastic bag in a wastebasket. Then hang the overnight bag inside the wastebasket.  You should also have a smaller leg bag that fits under your  clothes.  Always wear the leg bag below your knee.  Do not wear your leg bag at night.  How to care for your skin and catheter  Supplies needed  A clean washcloth.  Water and mild soap.  A clean towel.  Caring for your skin and catheter         Clean the skin around your catheter every day:  Wash your hands with soap and water.  Wet a clean washcloth in warm water and mild soap.  Clean the skin around your urethra.  If you are female:  Gently spread the folds of skin around your vagina (labia).  With the washcloth in your other hand, wipe the inner side of your labia on each side. Wipe from front to back.  If you are male:  Pull back any skin that covers the end of your penis (foreskin).  With the washcloth in your other hand, wipe your penis in small circles. Start wiping at the tip of your penis, then move away from the catheter.  Move the foreskin back in place, if needed.  With your free hand, hold the catheter close to where it goes into your body.  Keep holding the catheter during cleaning so it does not get pulled out.  With the washcloth in your other hand, clean the catheter.  Only wipe downward on the catheter.  Do not wipe upward toward your body. Doing this may push germs into your urethra and cause infection.  Use a clean towel to pat-dry the catheter and the skin around it. Make sure to wipe off all soap.  Wash your hands with soap and water.  Shower every day. Do not take baths.  Do not use cream, ointment, or lotion on the area where the catheter goes into your body, unless your doctor tells you to.  Do not use powders, sprays, or lotions on your genital area.  Check your skin around the catheter every day for signs of infection. Check for:  Redness, swelling, or pain.  Fluid or blood.  Warmth.  Pus or a bad smell.  How to empty the bag  Supplies needed  Rubbing alcohol.  Gauze pad or cotton ball.  Tape or a leg strap.  Emptying the bag  Pour the pee out of your bag when it is ?-½ full, or at least  2-3 times a day. Do this for your overnight bag and your leg bag.  Wash your hands with soap and water.  Separate (detach) the bag from your leg.  Hold the bag over the toilet or a clean pail. Keep the bag lower than your hips and bladder. This is so the pee (urine) does not go back into the tube.  Open the pour spout. It is at the bottom of the bag.  Empty the pee into the toilet or pail. Do not let the pour spout touch any surface.  Put rubbing alcohol on a gauze pad or cotton ball.  Use the gauze pad or cotton ball to clean the pour spout.  Close the pour spout.  Attach the bag to your leg with tape or a leg strap.  Wash your hands with soap and water.  Follow instructions for cleaning the drainage bag:  From the product maker.  As told by your doctor.  How to change the bag  Supplies needed  Alcohol wipes.  A clean bag.  Tape or a leg strap.  Changing the bag  Replace your bag when it starts to leak, smell bad, or look dirty.  Wash your hands with soap and water.  Separate the dirty bag from your leg.  Pinch the catheter with your fingers so that pee does not spill out.  Separate the catheter tube from the bag tube where these tubes connect (at the connection valve). Do not let the tubes touch any surface.  Clean the end of the catheter tube with an alcohol wipe. Use a different alcohol wipe to clean the end of the bag tube.  Connect the catheter tube to the tube of the clean bag.  Attach the clean bag to your leg with tape or a leg strap. Do not make the bag tight on your leg.  Wash your hands with soap and water.  General rules    Never pull on your catheter. Never try to take it out. Doing that can hurt you.  Always wash your hands before and after you touch your catheter or bag. Use a mild, fragrance-free soap. If you do not have soap and water, use hand .  Always make sure there are no twists or bends (kinks) in the catheter tube.  Always make sure there are no leaks in the catheter or bag.  Drink  enough fluid to keep your pee pale yellow.  Do not take baths, swim, or use a hot tub.  If you are female, wipe from front to back after you poop (have a bowel movement).  Contact a doctor if:  Your pee is cloudy.  Your pee smells worse than usual.  Your catheter gets clogged.  Your catheter leaks.  Your bladder feels full.  Get help right away if:  You have redness, swelling, or pain where the catheter goes into your body.  You have fluid, blood, pus, or a bad smell coming from the area where the catheter goes into your body.  Your skin feels warm where the catheter goes into your body.  You have a fever.  You have pain in your:  Belly (abdomen).  Legs.  Lower back.  Bladder.  You see blood in the catheter.  Your pee is pink or red.  You feel sick to your stomach (nauseous).  You throw up (vomit).  You have chills.  Your pee is not draining into the bag.  Your catheter gets pulled out.  Summary  An indwelling urinary catheter is a thin tube that is placed into the bladder to help drain pee (urine) out of the body.  The catheter is placed into the part of the body that drains pee from the bladder (urethra).  Taking good care of your catheter will keep it working properly and help prevent problems.  Always wash your hands before and after touching your catheter or bag.  Never pull on your catheter or try to take it out.  This information is not intended to replace advice given to you by your health care provider. Make sure you discuss any questions you have with your health care provider.  Document Released: 04/14/2014 Document Revised: 04/10/2020 Document Reviewed: 08/03/2018  Elsevier Patient Education © 2020 [employer]    Dysphagia Eating Plan, Minced and Moist Foods  This eating plan is for people with moderate swallowing problems who are transitioning from pureed to solid foods. Moist and minced foods are soft and cut into very small chunks so that they can be swallowed safely. On this eating plan, you may be  instructed to drink liquids that are thickened.  Work with your health care provider and your diet and nutrition specialist (dietitian) to make sure that you are following the diet safely and getting all the nutrients you need.  What are tips for following this plan?  General guidelines for foods    You may eat foods that are soft and moist.  Always test food texture before taking a bite. Poke food with a fork or spoon to make sure it is tender.  Take small bites. Each bite should be smaller than your little finger nail (about 4 mm by 4 mm).  If you were on a pureed food eating plan, you may still eat any of the foods included in that diet.  Avoid foods that are dry, hard, sticky, chewy, coarse, or crunchy.  Avoid foods that separate into thin liquids and solids, such as cereal with milk or chunky soups.  Avoid liquids that have seeds or chunks.  If instructed by your health care provider, thicken liquids. Follow your health care provider's instructions about what products to use, how to do this, and to what thickness.  You may use a commercial thickener, rice cereal, or potato flakes.  Thickened liquids are usually a “pudding-like” consistency, or they may be as thick as honey or thick enough to eat with a spoon.  Cooking  You may need to use a , whisk, or masher to soften some of your foods.  To moisten foods, you may add liquids while you are blending, mashing, or grinding your foods to the right consistency. These liquids include gravies, sauces, vegetable or fruit juice, milk, half and half, or water.  Reheat foods slowly to prevent a tough crust from forming.  Meal planning  Eat a variety of foods in order to get all the nutrients you need.  Follow your meal plan as told by your health care provider or dietitian.  What foods are allowed?  Grains  Soaked soft breads without nuts or seeds. Pancakes, sweet rolls, pastries, and Somali toast that have been moistened with syrup or sauce. Well-cooked pasta,  noodles, rice, and bread dressing in very small pieces and thick sauce. Soft dumplings or spaetzle in very small pieces and butter or gravy. Soft-cooked cereals.  Vegetables  Very soft, well-cooked vegetables in very small pieces. Soft-cooked, mashed potatoes. Thickened vegetable juice.  Fruits  Canned or cooked fruits that are soft or moist and do not have skin or seeds. Fresh, soft bananas. Thickened fruit juices.  Meat and other protein foods  Tender, moist, and finely minced or ground meats or poultry. Moist meatballs or meatloaf. Fish without bones. Scrambled, poached, or soft-cooked eggs. Tofu. Tempeh and meat alternatives in very small pieces. Well-cooked, moistened and mashed beans, baked beans, peas, and other legumes.  Dairy  Thickened milk. Cream cheese. Yogurt. Cottage cheese. Sour cream.  Fats and oils  Butter. Margarine. Cream for cereal, depending on liquid consistency allowed. Gravy. Cream sauces. Mayonnaise.  Sweets and desserts  Pudding. Custard. Ice cream and sherbet. Whipped toppings. Soft, moist cakes. Icing. Jelly. Jams and preserves without seeds.  Seasoning and other foods  Sauces and salsas that have soft chunks that are smaller than 4mm. Salad dressings. Casseroles with small pieces of tender meat. All seasonings and sweeteners.  Beverages  Anything prepared at the thickness recommended by your dietitian.  What foods are not allowed?  Grains  Breads that are hard or have nuts or seeds. Dry biscuits, pancakes, waffles, and bread dressing. Coarse cereals. Cereals that have nuts, seeds, dried fruits, or coconut. Sticky rice. Large pieces of pasta.  Vegetables  All raw vegetables. Tough, fibrous, chewy, or stringy cooked vegetables, such as celery, peas, broccoli, cabbage, [place] sprouts, and asparagus. Potato skins. Potato and other vegetable chips. Fried or Slovenian-fried potatoes. Cooked corn and peas.  Fruits  Hard, crunchy, stringy, high-pulp, and juicy raw fruits such as apples,  pineapple, papaya, and watermelon. Fruits with skins and seeds, such as grapes. Dried fruit and fruit leather.  Meats and other protein foods  Large pieces of meat. Dry, tough meats, such as douglas, sausage, and hot dogs. Chicken, turkey, or fish with skin and bones. Crunchy peanut butter. Nuts. Seeds.  Dairy  Yogurt with nuts, seeds, or large chunks. Large chunks of cheese. Frozen desserts and milk consistency not allowed by your dietitian.  Sweets and desserts  Coarse, hard, chewy, or sticky desserts. Any dessert with nuts, seeds, coconut, pineapple, or dried fruit. Bread pudding.  Seasoning and other foods  Soups and casseroles with large chunks. Sandwiches. Pizza.  Summary  Moist and minced foods can be helpful for people with moderate swallowing problems.  On the dysphagia eating plan, you may eat foods that are soft, moist, and cut into pieces smaller than 4mm by 4mm.  You may be instructed to thicken liquids. Follow your health care provider's instructions about how to do this and to what consistency.  This information is not intended to replace advice given to you by your health care provider. Make sure you discuss any questions you have with your health care provider.  Document Released: 12/18/2006 Document Revised: 04/09/2020 Document Reviewed: 03/30/2018  [employer] Patient Education © 2020 [employer]        FOLLOW UP ITEMS POST DISCHARGE  Please call [phone] to schedule PCP appointment for patient.    Required specialty appointments include:       Discharge Instructions per MIMA LeesPTovaRTovaN.    -Follow-up with PCP s/p hospitalization and for care management  -Follow-up with urology Lisa Garrido PA-C for management of indwelling catheter  -Indwelling urinary catheter to be left in due to history of urinary retention and will be managed by urology [place]  -Patient currently has advanced dementia and is oriented to self only, continue all medication; defer any decision to  DPOA, son Tad Davidson    DIET: As tolerated    ACTIVITY: As tolerated    DIAGNOSIS: UTI    Return to ER if symptoms persist, chest pain, palpitations, shortness of breath, numbness, tingling, weakness, and high fevers.

## 2022-08-17 NOTE — DISCHARGE PLANNING
0819 Received message from [facility] that [facility] has accepted patient and can  @ 1400. Updated Muna APRN and patient can D/C. Called and spoke with son Tad and updated on D/C plan for [facility]. Updated that transport set for 1400. Received verbal OK from son. Son requesting call from APRN for medical update. Updated Muna APRN that son requested call. Muna APRN calling son.

## 2022-08-17 NOTE — CARE PLAN
The patient is Stable - Low risk of patient condition declining or worsening    Shift Goals  Clinical Goals: skin care, manage infection  Patient Goals: rest  Family Goals: not present    Progress made toward(s) clinical / shift goals: progressing    Patient is not progressing towards the following goals:

## 2022-08-17 NOTE — DISCHARGE PLANNING
Updated by RN that patient test positive for Covid. Message sent to [facility] to see if patient can still go to [facility]. [facility] DPA checked with [facility] and patient unable to transfer with COVID positive test. Will need 10 days before able to transfer. Muna CHAPMAN updated.

## 2022-08-17 NOTE — DISCHARGE PLANNING
Agency/Facility Name: [facility]   Spoke To: Elda   Outcome: PHILIP faxed over COVID 19 Vaccination cards. Per Elda she can take the pt today at 1400. [facility] needs us to arrange transport for 1400. In addition pt will need a COVID test.     PHILIP notified [facility].     @[phone]-  Agency/Facility Name: [facility]   Spoke To: Elda   Outcome: Updated [facility] that pt is COVID +, asymptomatic and got his booster yesterday. Despite this, they cannot take him today.  PHILIP notified [facility].

## 2022-08-17 NOTE — DISCHARGE SUMMARY
Discharge Summary    CHIEF COMPLAINT ON ADMISSION  Chief Complaint   Patient presents with    Urinary Catheter Problem     Chronic krueger. Removed at nursing home. Discharge around penis.        Reason for Admission  catheter problem     Admission Date  8/14/2022    CODE STATUS  DNAR/DNI    HPI & HOSPITAL COURSE    Mr. Johann Davidson is an 88-year-old male with a PMHx of Lewy body dementia, chronic urinary retention with indwelling Krueger catheter, DMT2, GERD, HTN, who presented on 8/14/2022 from [facility] due to concerns of purulent discharge around chronic Krueger catheter.    Patient is only oriented to self.  Patient has been residing at [facility] due to significant history of dementia.  Patient was brought to the ER due to concerns of purulent discharge around Krueger catheter.  Krueger catheter was removed at [facility].  Admitting provider was unable to obtain history as patient was altered and was incoherent.  Cognitive baseline on admission was unknown.  Family was not at bedside.  Also noted that patient was in the ER on 8/1 after a ground-level fall and a head injury.  Other recent hospitalization indicated on 7/20 4-7/1931, patient was treated for UTI with urine culture positive for Pseudomonas and Enterococcus and treated with Zosyn.  Patient also noted to have a history of allergy to ciprofloxacin, but this was verified by a family member over the phone that he is NOT allergic to ciprofloxacin.    In ER, patient's Krueger catheter was replaced.  Also noted a small ulceration without surrounding erythema on the foreskin was noted.  Wound team was consulted.  Urine analysis came back positive for infection.  Patient was given cefepime in ER and switched to Zosyn thereafter.    During this course of stay, urine culture was process of which this came back positive for Pseudomonas aeruginosa.  Patient's was switched from IV Zosyn to levofloxacin with a total course of 7 days, with an estimated end date  of 8/21/2022.  Discussed case with patient's DPOA, son Tad Davidson regarding plan of care.  Patient had resided at [facility] due to significant history of dementia.  Patient has had recurrent issues with UTI and has a chronic indwelling catheter due to urinary retention that is managed by Lisa Nam PA-C.    Patient seen and examined prior to being discharged to [facility].  Patient will continue course of oral antibiotics for UTI with an end date of 8/21/2022.  Patient will continue to have Redd catheter and was recently exchanged here at our facility, [facility] on 8/14/2022 and will need to follow-up with Lisa Nam PA-C for management of Redd and recurrent UTI.  Patient's blood culture negative to date.  Patient will continue all home medication at [facility].  All questions and concerns answered prior to being discharged.  Patient discharged to [facility].    Therefore, he is discharged in good and stable condition to home with close outpatient follow-up.    The patient met 2-midnight criteria for an inpatient stay at the time of discharge.    Discharge Date  08/17/22      FOLLOW UP ITEMS POST DISCHARGE  Please call [phone] to schedule PCP appointment for patient.    Required specialty appointments include:       Discharge Instructions per Marques Sanchez, MIMAP.R.N.    -Follow-up with PCP s/p hospitalization and for care management  -Follow-up with Lisa Nam PA-C for management of indwelling catheter  -Indwelling urinary catheter to be left in due to history of urinary retention and will be managed by roger Garrido  -Finish course of 7-day oral antibiotics of levofloxacin for coverage of UTI with an end date of 8/21/2022  -Patient currently has advanced dementia and is oriented to self only, continue all medication; defer any decision to DPOA, son Tad Davidson    DIET: As tolerated    ACTIVITY: As tolerated    DIAGNOSIS:  UTI    Return to ER if symptoms persist, chest pain, palpitations, shortness of breath, numbness, tingling, weakness, and high fevers.      DISCHARGE DIAGNOSES  Principal Problem:    UTI (urinary tract infection) POA: Yes  Active Problems:    Dementia (HCC) POA: Yes    Urinary retention POA: Yes    GERD (gastroesophageal reflux disease) POA: Unknown  Resolved Problems:    Hypokalemia POA: Unknown      FOLLOW UP    [facility]  [address] [place] [address]  [phone]        Lisa Saeed P.A.-C.  [address] [id number] [phone]    Schedule an appointment as soon as possible for a visit in 1 week(s)      Melanie Crawley P.A.-C.  [address] [id number] [phone]    Schedule an appointment as soon as possible for a visit in 1 week(s)        MEDICATIONS ON DISCHARGE     Medication List        START taking these medications        Instructions   levoFLOXacin 750 MG tablet  Start taking on: August 18, 2022  Commonly known as: LEVAQUIN   Take 1 Tablet by mouth every day for 3 days.  Dose: 750 mg            CHANGE how you take these medications        Instructions   tamsulosin 0.4 MG capsule  What changed:   when to take this  additional instructions  Commonly known as: FLOMAX   Take 1 Capsule by mouth 1/2 hour after breakfast.  Dose: 0.4 mg            CONTINUE taking these medications        Instructions   acetaminophen 325 MG Tabs  Commonly known as: Tylenol   Take 650 mg by mouth every four hours as needed for Mild Pain.  Dose: 650 mg     artificial tears Oint ophth ointment  Commonly known as: EYE LUBRICANT   Apply 1 Application to both eyes every evening.  Dose: 1 Application     aspirin 81 MG Chew chewable tablet  Commonly known as: ASA   Chew 81 mg every day. Take one tablet by mouth once a day  Dose: 81 mg     atorvastatin 10 MG Tabs  Commonly known as: LIPITOR   Take 10 mg by mouth every day. Take 1 tablet by mouth once a day  Dose: 10 mg      bacitracin 500 UNIT/GM Oint   Apply 1 Each topically 2 times a day.  Dose: 1 Each     docusate sodium 100 MG Caps  Commonly known as: COLACE   Take 100 mg by mouth 2 times a day.  Dose: 100 mg     donepezil 5 MG Tabs  Commonly known as: ARICEPT   Take 5 mg by mouth every day. Take one tablet once a day  Dose: 5 mg     hydrocortisone 0.5 % Crea   Apply 1 Application topically 2 times a day.  Dose: 1 Application     omeprazole 20 MG delayed-release capsule  Commonly known as: PRILOSEC   Take 20 mg by mouth every day. Take one capsule by mouth daily  Dose: 20 mg     polyethylene glycol/lytes 17 g Pack  Commonly known as: MIRALAX   Take 17 g by mouth every day.  Dose: 17 g     QUEtiapine 25 MG Tabs  Commonly known as: Seroquel   Take 25 mg by mouth at bedtime as needed. Take one tablet nightly as needed  Dose: 25 mg     senna-docusate 8.6-50 MG Tabs  Commonly known as: PERICOLACE or SENOKOT S   Take 1 Tablet by mouth 1 time a day as needed (Take one tablet as needed once a day for constipation). Take one tablet as needed once a day for constipation  Indications: Constipation  Dose: 1 Tablet     Vitamin A & D Oint   Apply 1 Application topically every day.  Dose: 1 Application     vitamin B-12 CR 1000 MCG Tbcr tablet  Commonly known as: CYANOCOBALAMIN   Take 1,000 mcg by mouth every day. Take one tablet by mouth daily  Dose: 1,000 mcg              Allergies  Allergies   Allergen Reactions    Lidocaine      Per MAR from [place]     Tetanus-Diphth-Acell Pertussis        DIET  Orders Placed This Encounter   Procedures    Diet Order Diet: Level 5 - Minced and Moist; Liquid level: Level 2 - Mildly Thick     Standing Status:   Standing     Number of Occurrences:   1     Order Specific Question:   Diet:     Answer:   Level 5 - Minced and Moist [24]     Order Specific Question:   Liquid level     Answer:   Level 2 - Mildly Thick       ACTIVITY  As tolerated.  Weight bearing as  tolerated    CONSULTATIONS  NONE    PROCEDURES  NONE    IMAGING    No orders to display         LABORATORY  Lab Results   Component Value Date    SODIUM 142 08/17/2022    POTASSIUM 4.4 08/17/2022    CHLORIDE 114 (H) 08/17/2022    CO2 18 (L) 08/17/2022    GLUCOSE 92 08/17/2022    BUN 19 08/17/2022    CREATININE 1.01 08/17/2022        Lab Results   Component Value Date    WBC 9.1 08/17/2022    HEMOGLOBIN 11.0 (L) 08/17/2022    HEMATOCRIT 33.0 (L) 08/17/2022    PLATELETCT 305 08/17/2022        Total time of the discharge process took 36 minutes.    ========================================================================================================  Please note that this dictation was created using voice recognition software. I have made every reasonable attempt to correct obvious errors, but there may be errors of grammar and possibly content that I did not discover before finalizing the note.    Electronically signed by:  AKLYANI Sanchez, MSN, APRN, FNP-C  Hospitalist Services  [facility]  [phone]  [email or web address]  08/17/22                  0848

## 2022-08-17 NOTE — ASSESSMENT & PLAN NOTE
- Noted positive as of 8/17/2022  - no respiratory symptoms   -We will need to stay 10 days until COVID test is negative (8/27) prior to transfer back to SNF   - supportive care   - isolation precautions

## 2022-08-17 NOTE — PROGRESS NOTES
Pt received covid vaccine booster today.  Unable to change shirt at this time; pt fighting/resisting. Fed dinner, 50-75%.

## 2022-08-17 NOTE — PROGRESS NOTES
[facility] Medicine Daily Progress Note    Date of Service  8/17/2022    Chief Complaint  Johann Davidson is a 88 y.o. male admitted 8/14/2022 with urinary catheter problem    Hospital Course  Mr. Johann Davidson is an 88-year-old male with a PMHx of Lewy body dementia, chronic urinary retention with indwelling Redd catheter, DMT2, GERD, HTN, who presented on 8/14/2022 from [facility] [place] due to concerns of purulent discharge around chronic Redd catheter.    Patient is only oriented to self.  Patient has been residing at [facility] due to significant history of dementia.  Patient was brought to the ER due to concerns of purulent discharge around Redd catheter.  Redd catheter was removed at [facility] [place].  Admitting provider was unable to obtain history as patient was altered and was incoherent.  Cognitive baseline on admission was unknown.  Family was not at bedside.  Also noted that patient was in the ER on 8/1 after a ground-level fall and a head injury.  Other recent hospitalization indicated on 7/20 [birth year]/1931, patient was treated for UTI with urine culture positive for Pseudomonas and Enterococcus and treated with Zosyn.  Patient also noted to have a history of allergy to ciprofloxacin, but this was verified by a family member over the phone that he is NOT allergic to ciprofloxacin.    In ER, patient's Redd catheter was replaced.  Also noted a small ulceration without surrounding erythema on the foreskin was noted.  Wound team was consulted.  Urine analysis came back positive for infection.  Patient was given cefepime in ER and switched to Zosyn thereafter.      Interval Problem Update  8/16/2022  Patient seen and examined.  Patient resting comfortably in bed.  Attempted to discuss plan of care with patient, but only oriented to self.  Patient denies pain at this time.  -Plan of care: Continue to monitor patient; continue IV ABX for treatment of UTI; pending wound team for possible skin breakdown around  Redd  -Disposition: Anticipated to stay 2-3 midnights for IV ABX treatment and urine culture results and sensitivity; will need to go back to [facility]  -Lab work: Reviewed; expected  -VSS at this time    8/17/2022  -Patient seen and examined.  Patient still oriented to self.  Discussed plan of care with DPOA son Tad Davidson regarding plan of care.  Patient was slated to be transferred to [facility], unfortunately, patient noted to be COVID-19 positive on nasopharyngeal swab.  -Plan of care: Continue to monitor patient; continue oral antibiotics with a total of 7-day course; monitor for any symptoms of COVID; utilize abortive therapy  -Disposition: Anticipated to stay approximately 10 days until negative from COVID-19 starting 8/17; will need to be transferred to SNF with a COVID-negative test  -Lab work: Reviewed; expected  -VSS at this time    I have discussed this patient's plan of care and discharge plan at IDT rounds today with Case Management, Nursing, Nursing leadership, and other members of the IDT team.    Consultants/Specialty  NONE    Code Status  DNAR/DNI    Disposition  Patient is not medically cleared for discharge.   Anticipate discharge to to home with close outpatient follow-up.  I have placed the appropriate orders for post-discharge needs.    Review of Systems  Review of Systems   Unable to perform ROS: Dementia      Physical Exam  Temp:  [36.4 °C (97.5 °F)-37.1 °C (98.8 °F)] 36.6 °C (97.8 °F)  Pulse:  [64-76] 64  Resp:  [16-17] 17  BP: (107-142)/(55-68) 107/55  SpO2:  [92 %-96 %] 96 %    Physical Exam  Vitals and nursing note reviewed.   HENT:      Head: Normocephalic.      Nose: Nose normal.      Mouth/Throat:      Mouth: Mucous membranes are moist.      Pharynx: Oropharynx is clear.   Eyes:      Pupils: Pupils are equal, round, and reactive to light.   Cardiovascular:      Rate and Rhythm: Normal rate and regular rhythm.      Pulses: Normal pulses.      Heart sounds: Normal heart sounds.    Pulmonary:      Effort: Pulmonary effort is normal.      Breath sounds: Normal breath sounds.   Abdominal:      General: Bowel sounds are normal.      Palpations: Abdomen is soft.   Musculoskeletal:         General: Tenderness present.      Cervical back: Normal range of motion and neck supple.   Skin:     Capillary Refill: Capillary refill takes 2 to 3 seconds.   Neurological:      Mental Status: He is alert. Mental status is at baseline.       Fluids    Intake/Output Summary (Last 24 hours) at 8/17/2022 1213  Last data filed at 8/17/2022 0552  Gross per 24 hour   Intake 2000 ml   Output 1150 ml   Net 850 ml         Laboratory  Recent Labs     08/14/22  1809 08/15/22  0303 08/17/22  0327   WBC 10.1 9.7 9.1   RBC 4.29* 4.03* 3.53*   HEMOGLOBIN 13.3* 12.6* 11.0*   HEMATOCRIT 39.9* 36.8* 33.0*   MCV 93.0 91.3 93.5   MCH 31.0 31.3 31.2   MCHC 33.3* 34.2 33.3*   RDW 42.2 40.4 42.8   PLATELETCT 379 336 305   MPV 10.4 10.2 9.8       Recent Labs     08/14/22  1809 08/15/22  0303 08/17/22  0327   SODIUM 138 139 142   POTASSIUM 3.5* 3.9 4.4   CHLORIDE 105 108 114*   CO2 20 20 18*   GLUCOSE 126* 128* 92   BUN 19 19 19   CREATININE 1.06 1.02 1.01   CALCIUM 9.0 8.7 8.6                     Imaging  No orders to display          Assessment/Plan  * UTI (urinary tract infection)- (present on admission)  Assessment & Plan  -Recent urine culture positive for Pseudomonas, Enterococcus  -Given a dose of cefepime in the ER, will change to IV Zosyn; switched to Levofloxacin  -Follow-up with repeat urine culture  -Only catheter was replaced in ER  -Follow-up with urology as outpatient    COVID-19  Assessment & Plan  - Noted positive as of 8/17/2022  -We will need to stay 10 days until COVID test is negative  -Will need to be transferred to [facility]    GERD (gastroesophageal reflux disease)  Assessment & Plan  Continue omeprazole    Urinary retention- (present on admission)  Assessment & Plan  -Developed urinary retention at last  hospitalization last month, started flomax, has urology referral (appt 8/17/22)  -Continue with Redd catheter management  -Continue flomax    Dementia (HCC)- (present on admission)  Assessment & Plan  -History of Lewy body dementia per medical records.  -Resume Seroquel, donepezil  -Fall, aspiration precautions       VTE prophylaxis: SCDs/TEDs    I have performed a physical exam and reviewed and updated ROS and Plan today (8/17/2022). In review of yesterday's note (8/16/2022), there are no changes except as documented above.    ========================================================================================================  Please note that this dictation was created using voice recognition software. I have made every reasonable attempt to correct obvious errors, but there may be errors of grammar and possibly content that I did not discover before finalizing the note.    Electronically signed by:  KALYANI Sanchez, MSN, APRN, FNP-C  Hospitalist Services  [facility]  [phone]  [email or web address]  08/17/22                    2848

## 2022-08-17 NOTE — PROGRESS NOTES
Report received from night shift RN. Patient in bed resting comfortably. VSS, bed in lowest position and locked, call light in reach.

## 2022-08-18 NOTE — PROGRESS NOTES
4 Eyes Skin Assessment Completed by INES Hidalgo and INES Bhatt.    Head WDL  Ears Redness and Blanching  Nose WDL  Mouth WDL  Neck WDL  Breast/Chest WDL  Shoulder Blades WDL  Spine WDL  (R) Arm/Elbow/Hand Bruising, Scab, and Swelling, Wound present, R hand scab  (L) Arm/Elbow/Hand Redness, Blanching, Scab, and Swelling, L hand scab  Abdomen WDL  Groin Redness  Scrotum/Coccyx/Buttocks Redness and Blanching  (R) Leg Redness, Scab, Bruising, and Edema, inner thigh/ knee wound  (L) Leg Redness, Scar, Bruising, and Edema, inner thigh/knee wound  (R) Heel/Foot/Toe Redness and Non-Blanching  (L) Heel/Foot/Toe Redness and Non-Blanching, L second toe wound          Devices In Places Redd      Interventions In Place Heel Mepilex, Sacral Mepilex, Waffle Overlay, Pillows, and Q2 Turns    Possible Skin Injury Yes    Pictures Uploaded Into Epic Yes  Wound Consult Placed Yes  RN Wound Prevention Protocol Ordered Yes

## 2022-08-18 NOTE — DISCHARGE PLANNING
Case Management Discharge Planning    Admission Date: 8/14/2022  GMLOS: 2.9  ALOS: 3    6-Clicks ADL Score: 13  6-Clicks Mobility Score: 10  PT and/or OT Eval ordered: Yes  Post-acute Referrals Ordered: Yes  Post-acute Choice Obtained: Yes  Has referral(s) been sent to post-acute provider:  Yes      Anticipated Discharge Dispo: Discharge Disposition: D/T to SNF with Medicare cert in anticipation of skilled care (03)  Discharge Contact Phone Number: [phone]    DME Needed: No    Action(s) Taken: Acceptance Received    Escalations Completed: None    Medically Clear: No    Next Steps: Patient discussed in IDT rounds. Acceptance to [facility] received, however patient tested positive for Covid and requires 10 day recovery period ending 8/27 prior to DC to SNF. MD aware.    Barriers to Discharge: 10 day Covid recovery period required by SNF.    Is the patient up for discharge tomorrow: No

## 2022-08-18 NOTE — WOUND TEAM
[facility]  Inpatient Services  Initial Wound and Skin Care Evaluation    Admission Date: 2022     Last order of IP CONSULT TO WOUND CARE was found on 2022 from Hospital Encounter on 2022     HPI, PMH, SH: Reviewed    Past Surgical History:   Procedure Laterality Date    PLASTIC SURGERY Right 2022    RT back of RLE    CATARACT EXTRACTION WITH IOL Bilateral      Social History     Tobacco Use    Smoking status: Former     Types: Cigarettes     Quit date: 1985     Years since quittin.1    Smokeless tobacco: Never   Substance Use Topics    Alcohol use: Not Currently     Chief Complaint   Patient presents with    Urinary Catheter Problem     Chronic krueger. Removed at nursing home. Discharge around penis.      Diagnosis: UTI (urinary tract infection) [N39.0]    Unit where seen by Wound Team: T2     WOUND CONSULT/FOLLOW UP RELATED TO:  bilateral heels, bilateral hips, right medial knee, left LE medial      WOUND HISTORY:  patient came from [facility].     WOUND ASSESSMENT/LDA  Wound 08/15/22 Pressure Injury Hip Lateral Right pink, scant serous drainange (Active)   Wound Image   22 1700   Site Assessment Purple;Red 22 1700   Periwound Assessment Clean;Dry;Intact 22 1700   Margins Defined edges 22 1700   Closure Open to air 22 1700   Drainage Amount None 22 1700   Treatments Cleansed;Site care;Offloading 22 1700   Wound Cleansing Normal Saline Irrigation 22 1700   Periwound Protectant Not Applicable 22 1700   Dressing Cleansing/Solutions Not Applicable 22 1700   Dressing Options Hydrocolloid Thin;Mepilex 22 1700   Dressing Changed New 22 1700   Dressing Status Clean;Dry;Intact 22 1700   Dressing Change/Treatment Frequency Every 72 hrs, and As Needed 22 1700   NEXT Dressing Change/Treatment Date 22 1700   WOUND NURSE ONLY - Pressure Injury Stage DTPI 22 1700   Wound Length  (cm) 4.5 cm 08/17/22 1700   Wound Width (cm) 1.5 cm 08/17/22 1700   Wound Surface Area (cm^2) 6.75 cm^2 08/17/22 1700   Shape linear 08/17/22 1700   Wound Odor None 08/17/22 1700   Exposed Structures MAMI;None 08/17/22 1700   WOUND NURSE ONLY - Time Spent with Patient (mins) 15 08/17/22 1700   Number of days: 2       Wound 08/15/22 Pressure Injury Pretibial Proximal Left pink, scant drainange, serous, yellow slough in center (Active)   Wound Image   08/17/22 1700   Site Assessment Yellow 08/17/22 1700   Periwound Assessment Clean;Dry;Intact 08/17/22 1700   Margins Defined edges 08/17/22 1700   Closure Secondary intention;Open to air 08/17/22 1700   Drainage Amount None 08/17/22 1700   Treatments Cleansed;Site care;Offloading 08/17/22 1700   Wound Cleansing Normal Saline Irrigation 08/17/22 1700   Dressing Cleansing/Solutions Not Applicable 08/17/22 1700   Dressing Options Hydrocolloid Thin;Mepilex Heel 08/17/22 1700   Dressing Changed New 08/17/22 1700   Dressing Status Clean;Dry;Intact 08/17/22 1700   Dressing Change/Treatment Frequency Every 72 hrs, and As Needed 08/17/22 1700   NEXT Dressing Change/Treatment Date 08/20/22 08/17/22 1700   NEXT Weekly Photo (Inpatient Only) 08/24/22 08/17/22 1700   WOUND NURSE ONLY - Pressure Injury Stage U 08/17/22 1700   Wound Length (cm) 4 cm 08/17/22 1700   Wound Width (cm) 1.5 cm 08/17/22 1700   Wound Surface Area (cm^2) 6 cm^2 08/17/22 1700   Shape oval 08/17/22 1700   Exposed Structures MAMI;None 08/17/22 1700   WOUND NURSE ONLY - Time Spent with Patient (mins) 15 08/17/22 1700   Number of days: 2       Wound 08/15/22 Pressure Injury Knee Right (Active)   Wound Image   08/17/22 1700   Site Assessment Yellow 08/17/22 1700   Periwound Assessment Clean;Dry;Intact 08/17/22 1700   Margins Defined edges 08/17/22 1700   Closure Secondary intention 08/17/22 1700   Drainage Amount None 08/17/22 1700   Treatments Cleansed;Irrigation;Site care;Offloading 08/17/22 1700   Wound Cleansing  Normal Saline Irrigation 08/17/22 1700   Periwound Protectant Skin Protectant Wipes to Periwound 08/17/22 1700   Dressing Cleansing/Solutions Not Applicable 08/17/22 1700   Dressing Options Hydrocolloid Thin;Mepilex 08/17/22 1700   Dressing Changed New 08/17/22 1700   Dressing Status Clean;Dry;Intact 08/17/22 1700   Dressing Change/Treatment Frequency Every 72 hrs, and As Needed 08/17/22 1700   NEXT Dressing Change/Treatment Date 08/20/22 08/17/22 1700   NEXT Weekly Photo (Inpatient Only) 08/24/22 08/17/22 1700   WOUND NURSE ONLY - Pressure Injury Stage U 08/17/22 1700   Wound Length (cm) 1.5 cm 08/17/22 1700   Wound Width (cm) 3 cm 08/17/22 1700   Wound Surface Area (cm^2) 4.5 cm^2 08/17/22 1700   Shape oval 08/17/22 1700   Wound Odor None 08/17/22 1700   Exposed Structures MAMI;None 08/17/22 1700   WOUND NURSE ONLY - Time Spent with Patient (mins) 15 08/17/22 1700   Number of days: 2       Wound 08/15/22 Pressure Injury Foot Anterior scabbed over,dry, left second toe (Active)   Wound Image   08/17/22 1700   Site Assessment Eschar 08/17/22 1700   Periwound Assessment Dry;Intact;Clean 08/17/22 1700   Margins Defined edges 08/17/22 1700   Closure Open to air 08/17/22 1700   Drainage Amount None 08/17/22 1700   Treatments Offloading 08/17/22 1700   Wound Cleansing Not Applicable 08/17/22 1700   Dressing Cleansing/Solutions Not Applicable 08/17/22 1700   Dressing Options Open to Air 08/17/22 1700   Dressing Status Open to Air 08/17/22 1700   WOUND NURSE ONLY - Pressure Injury Stage U 08/17/22 1700   Wound Length (cm) 0.5 cm 08/17/22 1700   Wound Width (cm) 0.5 cm 08/17/22 1700   Wound Surface Area (cm^2) 0.25 cm^2 08/17/22 1700   Shape circular 08/17/22 1700   Wound Odor None 08/17/22 1700   Exposed Structures None 08/17/22 1700   WOUND NURSE ONLY - Time Spent with Patient (mins) 15 08/17/22 1700   Number of days: 2       Wound 08/17/22 Pressure Injury Hip Lateral Left (Active)   Wound Image   08/17/22 1700   Site  Assessment Purple;Red 22   Periwound Assessment Clean;Intact;Dry 22   Margins Defined edges 22   Closure Open to air 22   Drainage Amount None 22   Treatments Cleansed;Irrigation;Offloading 22   Wound Cleansing Normal Saline Irrigation 22   Dressing Cleansing/Solutions Not Applicable 22   Dressing Options Mepilex 22   Dressing Changed New 22   Dressing Status Clean;Dry;Intact 22   Dressing Change/Treatment Frequency Every 72 hrs, and As Needed 22   NEXT Dressing Change/Treatment Date 22   NEXT Weekly Photo (Inpatient Only) 22   WOUND NURSE ONLY - Pressure Injury Stage DTPI 22   Wound Length (cm) 3 cm 22   Wound Width (cm) 4.5 cm 22   Wound Surface Area (cm^2) 13.5 cm^2 22   Shape circular with linear 22   Exposed Structures None 22   WOUND NURSE ONLY - Time Spent with Patient (mins) 15 22   Number of days: 0        Vascular:    ZOEY:   ZOEY Results, Last 30 Days US-ZOEY SINGLE LEVEL BILAT    Result Date: 2022  Narrative  Vascular Laboratory  Conclusions  There is no evidence of significant arterial disease.  YESENIA REEVES  Age:    87    Gender:     M  MRN:    [id number]  :    1934      BSA:  Exam Date:     2022 09:09  Room #:     Inpatient  Priority:     Routine  Ht (in):             Wt (lb):  Ordering Physician:        ELADIA DUNHAM  Referring Physician:       [id number] Garcia  Sonographer:               Nelson Sandra RVJIMY  Study Type:                Complete Bilateral  Technical Quality:         Good  Indications:     Ulcer of lower extremity  CPT Codes:       88151  ICD Codes:       707.1  History:         ulcer of lower extremity; scabs, excoriations, no prior exam  Limitations:                 RIGHT  Waveform             Systolic BPs (mmHg)                             116           Brachial  Triphasic                                Common Femoral  Triphasic                                Popliteal  Triphasic                  148           Posterior Tibial  Triphasic                  138           Dorsalis Pedis                                           Digit                             1.28          ZOEY                                           TBI                       LEFT  Waveform        Systolic BPs (mmHg)                             112           Brachial  Triphasic                                Common Femoral  Triphasic                                Popliteal  Triphasic                  146           Posterior Tibial  Triphasic                  131           Dorsalis Pedis                                           Digit                             1.26          ZOEY                                           TBI  Findings  BILATERAL:  Doppler waveforms of the common femoral artery and popliteal artery are  high amplitude and triphasic.  Doppler waveforms at the ankle are brisk and triphasic.  The ankle-brachial indices are normal.  There is no evidence of significant arterial disease demonstrated.  Additional testing was not performed in accordance with lower extremity  arterial evaluation protocol.  Kristian Watkins MD  (Electronically Signed)  Final Date:      30 July 2022                   11:43      Lab Values:    Lab Results   Component Value Date/Time    WBC 9.1 08/17/2022 03:27 AM    RBC 3.53 (L) 08/17/2022 03:27 AM    HEMOGLOBIN 11.0 (L) 08/17/2022 03:27 AM    HEMATOCRIT 33.0 (L) 08/17/2022 03:27 AM    HBA1C 5.2 07/24/2022 09:56 AM        Culture Results show:  No results found for this or any previous visit (from the past 720 hour(s)).    Pain Level/Medicated:  moaning and groaning, gentle movements        INTERVENTIONS BY WOUND TEAM:  Chart and images reviewed. Discussed with bedside RN. All areas of concern (based  on picture review, LDA review and discussion with bedside RN) have been thoroughly assessed. Documentation of areas based on significant findings. This RN in to assess patient. Performed standard wound care which includes appropriate positioning, dressing removal and non-selective debridement. Pictures and measurements obtained weekly if/when required.  Preparation for Dressing removal: KARRI  Non-selectively Debrided with:  NS and gauze.  Sharp debridement: NA  Jeanette wound: Cleansed with NS and gauze, Prepped with no sting skin prep  Primary Dressing: hydrocolloid thin applied to right medial knee, left medial LE, right lateral hip  Secondary (Outer) Dressing: mepilex sacral and heels placed to all concerning areas.    Heels -  mepilex, scar tissue present  no pressure injury nor wound.     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  Patient has many open pressure injuries due to his inability to move to the right side, or even wanting to be just on his left side.  SHERRY ordered, TAPs ordered, Z-flow pillow ordered.  Patient will benefit from offloading all areas.  Mepilex placed to concerning areas, hydrocolloid applied to unstageable pressure areas.   Bilateral heels has scar tissue no wound nor pressure at this time.  Mepilex heels in place.      Goals: Steady decrease in wound area and depth weekly.    WOUND TEAM PLAN OF CARE ([X] for frequency of wound follow up,):   Nursing to follow dressing orders written for wound care. Contact wound team if area fails to progress, deteriorates or with any questions/concerns if something comes up before next scheduled follow up (See below as to whether wound is following and frequency of wound follow up)  Dressing changes by wound team:                   Follow up 3 times weekly:                NPWT change 3 times  weekly:     Follow up 1-2 times weekly:      Follow up Bi-Monthly:                   Follow up as needed:   x  Other (explain):     NURSING PLAN OF CARE ORDERS (X):  Dressing changes: See Dressing Care orders: x  Skin care: See Skin Care orders: x  RN Prevention Protocol: x  Rectal tube care: See Rectal Tube Care orders:   Other orders:    RSKIN:   CURRENTLY IN PLACE (X), APPLIED THIS VISIT (A), ORDERED (O):   Q shift Jose:  X  Q shift pressure point assessments:  X    Surface/Positioning   Pressure redistribution mattress   x         Low Airloss        O  Bariatric foam      Bariatric SHERRY     Waffle cushion        Waffle Overlay          Reposition q 2 hours    O  TAPs Turning system   O  Z Abdirashid Pillow O    Offloading/Redistribution   Sacral Mepilex (Silicone dressing)   A  Heel Mepilex (Silicone dressing)       A  Heel float boots (Prevalon boot)      O       Float Heels off Bed with Pillows           Respiratory NA, room air  Silicone O2 tubing         Gray Foam Ear protectors     Cannula fixation Device (Tender )          High flow offloading Clip    Elastic head band offloading device      Anchorfast                                                         Trach with Optifoam split foam             Containment/Moisture Prevention     Rectal tube or BMS    Purwick/Condom Cath        Redd Catheter  x  Barrier wipes           Barrier paste   x    Antifungal tx      Interdry        Mobilization NA      Up to chair        Ambulate      PT/OT      Nutrition NA      Dietician        Diabetes Education      PO     TF     TPN     NPO   # days     Other        Anticipated discharge plans:   LTACH:        SNF/Rehab:     x             Home Health Care:           Outpatient Wound Center:            Self/Family Care:        Other:                  Vac Discharge Needs: NA  Not Applicable Pt not on a wound vac:   x    Regular Vac while inpatient, alternative dressing at DC:        Regular Vac in use and continued at DC:             Reg. Vac w/ Skin Sub/Biologic in use. Will need to be changed 2x wkly:      Veraflo Vac while inpatient, ok to transition to Regular Vac on Discharge:           Veraflo Vac while inpatient, will need to remain on Veraflo Vac upon discharge:

## 2022-08-18 NOTE — WOUND TEAM
Wound consult for bilateral heels, patient was seen by wound team yesterday with note saying the heels were assessed and scar tissue apparent to the heels.  Heel mepilex were placed and prevalon boots were ordered to be placed on patient.  Ordered a SHERRY bed and TAP system for offloading.  All preventions ordered and for nursing to implement.  Wound consult completed at this time.

## 2022-08-18 NOTE — THERAPY
08/18/22 1100   Interdisciplinary Plan of Care Collaboration   Collaboration Comments OT consult received. Per chart and RN, pt is A +Ox1 at baseline, came from a memory care center, and has been wheelchair vs bed bound due to a recent ankle surgery. Per RN, pt does not participate in ADLs and would not be appropriate to evaluate at this time. Due to this, OT order has been completed and pt will not be followed. If anything changes, please reorder.

## 2022-08-18 NOTE — CARE PLAN
The patient is Watcher - Medium risk of patient condition declining or worsening    Shift Goals  Clinical Goals: Q2 turns, SNF  placement  Patient Goals: Rest, comfort  Family Goals: Not present    Progress made toward(s) clinical / shift goals:    Problem: Skin Integrity  Goal: Skin integrity is maintained or improved  Outcome: Progressing     Problem: Fall Risk  Goal: Patient will remain free from falls  Outcome: Progressing     Problem: Pain - Standard  Goal: Alleviation of pain or a reduction in pain to the patient’s comfort goal  Outcome: Progressing       Patient seen by wound care today. Patient remained free from falls and did not exhibit signs of pain.     Patient is not progressing towards the following goals:

## 2022-08-18 NOTE — PROGRESS NOTES
Hospital Medicine Daily Progress Note    Date of Service  8/18/2022    Chief Complaint  Johann Davidson is a 88 y.o. male admitted 8/14/2022 with urinary catheter problem    Hospital Course  Mr. 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    Patient is only oriented to self.  Patient has been residing at [facility] due to significant history of dementia.  Patient was brought to the ER due to concerns of purulent discharge around Redd catheter.  Redd catheter was removed at [facility].  Admitting provider was unable to obtain history as patient was altered and was incoherent.  Cognitive baseline on admission was unknown.  Family was not at bedside.  Also noted that patient was in the ER on 8/1 after a ground-level fall and a head injury.  Other recent hospitalization indicated on 7/20 4-7/1931, patient was treated for UTI with urine culture positive for Pseudomonas and Enterococcus and treated with Zosyn.  Patient also noted to have a history of allergy to ciprofloxacin, but this was verified by a family member over the phone that he is NOT allergic to ciprofloxacin.    In ER, patient's Redd catheter was replaced.  Also noted a small ulceration without surrounding erythema on the foreskin was noted.  Wound team was consulted.  Urine analysis came back positive for infection.  Patient was given cefepime in ER and switched to Zosyn thereafter. Cultures growing pseudomonas sensitive to levofloxacin thus he was switched to oral regiment. Discharge back to [facility] was planned however his screening COVID returned positive thus he was not accepted and requires 10 days of quarantine prior to acceptance back.       Interval Problem Update  Patient seen and examined.  Patient resting comfortably  "in bed, he is in fetal position. Answers no to pain but only oriented to self and unable to provide me with meaningful information   - cont. Levofloxacin for pseudomonas UTI (total 7 day course, end date 8/21)  - saturating well on room Air, continue COVID precautions, monitor for any decompensation. Will be \"COVID recovered\" on 8/27  - labs and vitals remain stable     Addendum:  Spoke to son, he is concerned about krueger as it does seem to be bothering the patient a lot and causing issues. Krueger was placed last visit after he presented with UTI and retention, has not had voiding trial   - increase tamsulosin dosing   - discontinue krueger and do voiding trial, orders placed       I have discussed this patient's plan of care and discharge plan at IDT rounds today with Case Management, Nursing, Nursing leadership, and other members of the IDT team.    Consultants/Specialty  NONE    Code Status  DNAR/DNI    Disposition  Patient is medically cleared pending COVID recovery (8/27)  for discharge.   Anticipate discharge to to home with close outpatient follow-up.  I have placed the appropriate orders for post-discharge needs.    Review of Systems  Review of Systems   Unable to perform ROS: Dementia      Physical Exam  Temp:  [36.2 °C (97.2 °F)-37.2 °C (98.9 °F)] 37.2 °C (98.9 °F)  Pulse:  [66-80] 73  Resp:  [14-20] 18  BP: (109-134)/(50-82) 112/65  SpO2:  [93 %-96 %] 96 %    Physical Exam  Vitals and nursing note reviewed.   Constitutional:       Appearance: He is ill-appearing. He is not toxic-appearing.   HENT:      Head: Normocephalic.   Eyes:      Extraocular Movements: Extraocular movements intact.   Cardiovascular:      Rate and Rhythm: Normal rate and regular rhythm.      Heart sounds: Normal heart sounds.   Pulmonary:      Effort: Pulmonary effort is normal.      Breath sounds: Normal breath sounds.   Abdominal:      General: Bowel sounds are normal.      Palpations: Abdomen is soft.   Musculoskeletal:      Cervical " back: Normal range of motion and neck supple.      Right lower leg: Edema present.      Left lower leg: Edema present.      Comments: 2-3+ pitting edema bilaterally    Skin:     General: Skin is warm.      Capillary Refill: Capillary refill takes 2 to 3 seconds.   Neurological:      Mental Status: He is alert. Mental status is at baseline. He is disoriented.   Psychiatric:      Comments: Withdrawn, flat        Fluids    Intake/Output Summary (Last 24 hours) at 8/18/2022 1257  Last data filed at 8/18/2022 0500  Gross per 24 hour   Intake --   Output 900 ml   Net -900 ml       Laboratory  Recent Labs     08/17/22 0327 08/18/22 0229   WBC 9.1 7.6   RBC 3.53* 3.78*   HEMOGLOBIN 11.0* 11.6*   HEMATOCRIT 33.0* 34.9*   MCV 93.5 92.3   MCH 31.2 30.7   MCHC 33.3* 33.2*   RDW 42.8 42.5   PLATELETCT 305 345   MPV 9.8 10.4     Recent Labs     08/17/22 0327 08/18/22 0229   SODIUM 142 141   POTASSIUM 4.4 3.9   CHLORIDE 114* 111   CO2 18* 18*   GLUCOSE 92 83   BUN 19 16   CREATININE 1.01 0.90   CALCIUM 8.6 9.0                   Imaging  No orders to display          Assessment/Plan  * UTI (urinary tract infection)- (present on admission)  Assessment & Plan  -Recent urine culture positive for Pseudomonas, Enterococcus  -Given a dose of cefepime in the ER,changed to IV Zosyn; switched to Levofloxacin  -catheter was replaced in ER  -Follow-up with urology as outpatient    COVID-19  Assessment & Plan  - Noted positive as of 8/17/2022  - no respiratory symptoms   -We will need to stay 10 days until COVID test is negative (8/27) prior to transfer back to [facility]   - supportive care   - isolation precautions     GERD (gastroesophageal reflux disease)  Assessment & Plan  Continue omeprazole    Multiple excoriations- (present on admission)  Assessment & Plan  No obvious infection   Monitor   Keep skin hydrated   Q2H turns     Urinary retention- (present on admission)  Assessment & Plan  -Developed urinary retention at last hospitalization  last month, started flomax, has urology referral (appt 8/17/22)  -Continue with Redd catheter management  -Continue flomax    Dementia (HCC)- (present on admission)  Assessment & Plan  -History of Lewy body dementia per medical records.  -Resume Seroquel, donepezil  -Fall, aspiration precautions  - at baseline, A&O x1       VTE prophylaxis: SCDs/TEDs

## 2022-08-18 NOTE — PROGRESS NOTES
1956: Received report from Migel RN via voalte.     2030: Pt arrived to floor, oriented to unit, skin assessment completed see notes, A&Ox1 oriented to self only, pt confused in conversation often stating single words that don't fit conversation. Bed alarm placed under pt, waffle cushion in place, education regarding isolation precautions given, demonstrates no signs of pain, call light within reach, currently resting comfortably in bed. No additional needs at this time.

## 2022-08-18 NOTE — CARE PLAN
The patient is Stable - Low risk of patient condition declining or worsening    Shift Goals  Clinical Goals: Pt will remain free from falls  Patient Goals: Rest  Family Goals: MAMI    Progress made toward(s) clinical / shift goals:  Pt remained free from falls throughout the night and was able to rest comfortably in bed throughout much of the night. No acute changes.     Problem: Knowledge Deficit - Standard  Goal: Patient and family/care givers will demonstrate understanding of plan of care, disease process/condition, diagnostic tests and medications  Outcome: Progressing     Problem: Skin Integrity  Goal: Skin integrity is maintained or improved  Outcome: Progressing     Problem: Fall Risk  Goal: Patient will remain free from falls  Outcome: Progressing     Problem: Pain - Standard  Goal: Alleviation of pain or a reduction in pain to the patient’s comfort goal  Outcome: Progressing

## 2022-08-18 NOTE — DIETARY
NUTRITION SERVICES - Alert received for newly identified wound. Wound team consult this morning - no indication of stage 2 or greater pressure injuries per wound team RN note. No other nutrition related triggers noted at this time.     RD monitoring per department policy.

## 2022-08-19 NOTE — CARE PLAN
The patient is Stable - Low risk of patient condition declining or worsening    Shift Goals  Clinical Goals: Prevent urinary retention, maintain skin integrity  Patient Goals: Rest, comfort  Family Goals: gabe    Progress made toward(s) clinical / shift goals:  Pt educated on need to assess wounds and reposition. Pt wounds assessed and documented. Pt free of falls with safety measures in place.       Problem: Knowledge Deficit - Standard  Goal: Patient and family/care givers will demonstrate understanding of plan of care, disease process/condition, diagnostic tests and medications  Outcome: Progressing     Problem: Skin Integrity  Goal: Skin integrity is maintained or improved  Outcome: Progressing     Problem: Fall Risk  Goal: Patient will remain free from falls  Outcome: Progressing

## 2022-08-19 NOTE — DOCUMENTATION QUERY
[facility]                                                                       Query Response Note      PATIENT:               YESENIA REEVES  ACCT #:                  [id number]  MRN:                     [id number]  :                      1934  ADMIT DATE:       2022 5:49 PM  DISCH DATE:          RESPONDING  PROVIDER #:        [id number]           QUERY TEXT:    The following pressure injuries are noted in the Wound Team Eval:     1) Deep tissue pressure injury right hip  2) Unstageable pressure injury left pretibial   3) Unstageable pressure injury right knee   4) Unstageable pressure injury left anterior foot, left second toe  5) Deep tissue pressure injury left hip     Please clarify if these are relevant diagnoses.    The patient's Clinical Indicators include:   Wound Note:  Pressure injury hip lateral right, DTPI; pressure injury pretibial proximal left pink, unstageable;  pressure injury knee right unstageable; pressure injury anterior foot unstageable; pressure injury left lateral hip DTPI  Per Epic Wound Flowsheet : all wounds POA: yes   Risk Factors: dementia, contractures to legs/ body  Treatment: wound team eval, Low airloss mattress, TAPs , pressure offloading, mepilex, hydrocolloid, reposition q2h, heel float boots, barrier paste    Thank you,  Colton Serrano RN, BSN  Clinical   Connect via [employer]  Options provided:   -- POA pressure injuries of left pretibial, right knee, left foot/ toe, and Deep tissue pressure injuries bilateral hips ruled in   -- Other explanation, -please specify   -- Findings of no clinical significance   -- Unable to determine      Query created by: Colton Serrano on 2022 11:31 AM    RESPONSE TEXT:    POA pressure injuries of left pretibial, right knee, left foot/ toe, and Deep tissue pressure injuries bilateral hips ruled in          Electronically  signed by:  TASNEEM FERGUSON MD 8/19/2022 9:31 AM

## 2022-08-19 NOTE — DOCUMENTATION QUERY
[facility]                                                                       Query Response Note      PATIENT:               YESENIA REEVES  ACCT #:                  [id number]  MRN:                     [id number]  :                      1934  ADMIT DATE:       2022 5:49 PM  DISCH DATE:          RESPONDING  PROVIDER #:        [id number]           QUERY TEXT:    Can the relationship between the UTI and indwelling krugeer catheter be clarified?    The patient's Clinical Indicators include:   ED Note: Urinary tract infection associated with indwelling urethral catheter.   H/P: Chronic urinary retention, indwelling Krueger, UTI.   Urine cx: Pseudomonas aeruginosa    Risk factor: Krueger catheter    Treatment: IV Zosyn, krueger replaced in ER    Thank you,  Juli Vergara  Clinical   Connect via [employer]  Options provided:   -- Urinary tract infection is associated with/ due to indwelling urethral catheter   -- Urinary tract infection and indwelling urethral catheter are unrelated to each other   -- Other explanation, Please specify   -- Unable to determine      Query created by: Colton Serrano on 2022 11:28 AM    RESPONSE TEXT:    Urinary tract infection is associated with/ due to indwelling urethral catheter          Electronically signed by:  TASNEEM FERGUSON MD 2022 10:17 AM

## 2022-08-19 NOTE — PROGRESS NOTES
Pt skin assessed. Wound found on right achilles w/ blanching redness and peeling of skin. Bilateral heels boggy and red/purple, slow blanching. Wounds present on left shin and bilateral knees, covered in hydrocolloid thin and mepilex dressings. Non-blanching redness on right hip and wound present on left hip; mepilex applied. Generalized scabs present on right arm and bilateral hands and toes. Non-tender, blanching lump present on right lower back.

## 2022-08-19 NOTE — PROGRESS NOTES
Hospital Medicine Daily Progress Note    Date of Service  8/19/2022    Chief Complaint  Johann Davidson is a 88 y.o. male admitted 8/14/2022 with urinary catheter problem    Hospital Course  Mr. 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    Patient is only oriented to self.  Patient has been residing at [facility] due to significant history of dementia.  Patient was brought to the ER due to concerns of purulent discharge around Redd catheter.  Redd catheter was removed at [facility].  Admitting provider was unable to obtain history as patient was altered and was incoherent.  Cognitive baseline on admission was unknown.  Family was not at bedside.  Also noted that patient was in the ER on 8/1 after a ground-level fall and a head injury.  Other recent hospitalization indicated on 7/20 4-7/1931, patient was treated for UTI with urine culture positive for Pseudomonas and Enterococcus and treated with Zosyn.  Patient also noted to have a history of allergy to ciprofloxacin, but this was verified by a family member over the phone that he is NOT allergic to ciprofloxacin.    In ER, patient's Redd catheter was replaced.  Also noted a small ulceration without surrounding erythema on the foreskin was noted.  Wound team was consulted.  Urine analysis came back positive for infection.  Patient was given cefepime in ER and switched to Zosyn thereafter. Cultures growing pseudomonas sensitive to levofloxacin thus he was switched to oral regiment. Discharge back to [facility] was planned however his screening COVID returned positive thus he was not accepted and requires 10 days of quarantine prior to acceptance back.       Interval Problem Update  Patient seen and examined.  Patient resting comfortably,  "confused, agitated this am with nursing   - cont. Levofloxacin for pseudomonas UTI (total 7 day course, end date 8/21)  - voiding trial, voiding, monitor closely with bladder scans for signs of retention   - saturating well on room Air, continue COVID precautions, monitor for any decompensation. Will be \"COVID recovered\" on 8/27  - labs and vitals remain stable         I have discussed this patient's plan of care and discharge plan at IDT rounds today with Case Management, Nursing, Nursing leadership, and other members of the IDT team.    Consultants/Specialty  NONE    Code Status  DNAR/DNI    Disposition  Patient is medically cleared pending COVID recovery (8/27)  for discharge.   Anticipate discharge to to home with close outpatient follow-up.  I have placed the appropriate orders for post-discharge needs.    Review of Systems  Review of Systems   Unable to perform ROS: Dementia      Physical Exam  Temp:  [36.6 °C (97.9 °F)-37.6 °C (99.7 °F)] 37.6 °C (99.7 °F)  Pulse:  [] 77  Resp:  [18-24] 20  BP: ()/(52-88) 133/88  SpO2:  [94 %-98 %] 94 %    Physical Exam  Vitals and nursing note reviewed.   Constitutional:       Appearance: He is ill-appearing. He is not toxic-appearing.   HENT:      Head: Normocephalic.   Eyes:      Extraocular Movements: Extraocular movements intact.   Cardiovascular:      Rate and Rhythm: Normal rate and regular rhythm.      Heart sounds: Normal heart sounds.   Pulmonary:      Effort: Pulmonary effort is normal.      Breath sounds: Normal breath sounds.   Abdominal:      General: Bowel sounds are normal.      Palpations: Abdomen is soft.   Musculoskeletal:      Cervical back: Normal range of motion and neck supple.      Right lower leg: Edema present.      Left lower leg: Edema present.      Comments: 2-3+ pitting edema bilaterally    Skin:     General: Skin is warm.      Capillary Refill: Capillary refill takes 2 to 3 seconds.   Neurological:      Mental Status: He is alert. " Mental status is at baseline. He is disoriented.   Psychiatric:      Comments: Withdrawn, flat        Fluids    Intake/Output Summary (Last 24 hours) at 8/19/2022 1539  Last data filed at 8/18/2022 1908  Gross per 24 hour   Intake 240 ml   Output 700 ml   Net -460 ml         Laboratory  Recent Labs     08/17/22  0327 08/18/22 0229 08/19/22  0353   WBC 9.1 7.6 7.7   RBC 3.53* 3.78* 3.87*   HEMOGLOBIN 11.0* 11.6* 11.9*   HEMATOCRIT 33.0* 34.9* 36.7*   MCV 93.5 92.3 94.8   MCH 31.2 30.7 30.7   MCHC 33.3* 33.2* 32.4*   RDW 42.8 42.5 43.3   PLATELETCT 305 345 357   MPV 9.8 10.4 10.0       Recent Labs     08/17/22 0327 08/18/22 0229 08/19/22  0353   SODIUM 142 141 142   POTASSIUM 4.4 3.9 3.8   CHLORIDE 114* 111 112   CO2 18* 18* 18*   GLUCOSE 92 83 114*   BUN 19 16 20   CREATININE 1.01 0.90 1.04   CALCIUM 8.6 9.0 8.7                     Imaging  No orders to display          Assessment/Plan  * UTI (urinary tract infection)- (present on admission)  Assessment & Plan  -Recent urine culture positive for Pseudomonas, Enterococcus  -Given a dose of cefepime in the ER,changed to IV Zosyn; switched to Levofloxacin  -catheter was replaced in ER  -Follow-up with urology as outpatient    COVID-19  Assessment & Plan  - Noted positive as of 8/17/2022  - no respiratory symptoms   -We will need to stay 10 days until COVID test is negative (8/27) prior to transfer back to [facility]   - supportive care   - isolation precautions     GERD (gastroesophageal reflux disease)  Assessment & Plan  Continue omeprazole    Multiple excoriations- (present on admission)  Assessment & Plan  No obvious infection   Monitor   Keep skin hydrated   Q2H turns     Urinary retention- (present on admission)  Assessment & Plan  -Developed urinary retention at last hospitalization last month, started flomax, has urology referral (appt 8/17/22)  -Continue with Redd catheter management  -Continue flomax    Dementia (HCC)- (present on admission)  Assessment &  Plan  -History of Lewy body dementia per medical records.  -Resume Seroquel, donepezil  -Fall, aspiration precautions  - at baseline, A&O x1         VTE prophylaxis: SCDs/TEDs

## 2022-08-20 NOTE — PROGRESS NOTES
09:30 - Performed bladder scan on the patient, retained volume =642. Notified Dr. Toledo.    12:22: Patient voided X1, performed bladder scan again, PVR = 503 mL. Notified Dr. ANTONELLA Toledo. Received order to bladder scan again in another hour.    15:00 Bladder scan performed and residual volume = 570 mL. Notified Dr. Toledo.    15:35 - Received order to perform straight cath on the patient from Dr. Toledo.     16:40 - Performed straight cath and got 580 mL  urine out. Patient tolerated procedure. Notified Dr. Toledo.

## 2022-08-20 NOTE — CARE PLAN
The patient is Stable - Low risk of patient condition declining or worsening    Shift Goals  Clinical Goals: Maintain skin integrity  Patient Goals: Rest, comfort  Family Goals: patient to get back to baseline    Progress made toward(s) clinical / shift goals: Pt wounds cleansed with wound  and dressings reapplied. Pt repositioned in bed with safety precautions in place and call light in reach.

## 2022-08-20 NOTE — PROGRESS NOTES
A&O x1, oriented to self. Pt urinated in bed, bedding changed and pt repositioned. Pt wounds cleansed and redressed by Rns. Pt resting in bed with safety precautions in place.

## 2022-08-20 NOTE — PROGRESS NOTES
Hospital Medicine Daily Progress Note    Date of Service  8/20/2022    Chief Complaint  Johann Davidson is a 88 y.o. male admitted 8/14/2022 with urinary catheter problem    Hospital Course  Mr. 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    Patient is only oriented to self.  Patient has been residing at [facility] due to significant history of dementia.  Patient was brought to the ER due to concerns of purulent discharge around Redd catheter.  Redd catheter was removed at [facility].  Admitting provider was unable to obtain history as patient was altered and was incoherent.  Cognitive baseline on admission was unknown.  Family was not at bedside.  Also noted that patient was in the ER on 8/1 after a ground-level fall and a head injury.  Other recent hospitalization indicated on 7/20 4-7/1931, patient was treated for UTI with urine culture positive for Pseudomonas and Enterococcus and treated with Zosyn.  Patient also noted to have a history of allergy to ciprofloxacin, but this was verified by a family member over the phone that he is NOT allergic to ciprofloxacin.    In ER, patient's Redd catheter was replaced.  Also noted a small ulceration without surrounding erythema on the foreskin was noted.  Wound team was consulted.  Urine analysis came back positive for infection.  Patient was given cefepime in ER and switched to Zosyn thereafter. Cultures growing pseudomonas sensitive to levofloxacin thus he was switched to oral regiment. Discharge back to [facility] was planned however his screening COVID returned positive thus he was not accepted and requires 10 days of quarantine prior to acceptance back.       Interval Problem Update  Patient seen and examined.  Patient resting comfortably,  "confused, sleeping most of the day. Poor oral intake   - is voiding but post void still high, likely overflow incontinence, would like to avoid krueger given wound to penis  and pt has appears to have significant discomfort from it, son agrees. Will straight cath and continue voiding trial, straight cath >400. Tamsulosin was increased   - cont. Levofloxacin for pseudomonas UTI (total 7 day course, end date 8/21)  - saturating well on room Air, continue COVID precautions, monitor for any decompensation. Will be \"COVID recovered\" on 8/27  - labs and vitals remain stable     Spoke with son regarding plan of care moving forward (spent 31 minutes) discussed options of retention including suprapubic cath which he would rather have than penile krueger, will contiue voiding trial and if he continues to fail, will reach out to urology to discuss superpubic cath on Monday.   Discussed his poor oral intake and concern that he may continue to decline, discussed hospice which he was open to, he states that [place] does have hospice agency that can see him there, will place referral for further discussion. Plan was to dc to SNF for wound care? However may be able to just discharge back to [place], will follow up on Monday. Questions answered. Son will be in tomorrow and help with feeding.       I have discussed this patient's plan of care and discharge plan at IDT rounds today with Case Management, Nursing, Nursing leadership, and other members of the IDT team.    Consultants/Specialty  NONE    Code Status  DNAR/DNI    Disposition  Patient is medically cleared pending COVID recovery (8/27)  for discharge.   Anticipate discharge to to skilled nursing facility.  I have placed the appropriate orders for post-discharge needs.    Review of Systems  Review of Systems   Unable to perform ROS: Dementia      Physical Exam  Temp:  [36.4 °C (97.6 °F)-36.6 °C (97.9 °F)] 36.6 °C (97.9 °F)  Pulse:  [68-76] 68  Resp:  [17-20] 19  BP: " ()/(55-62) 111/61  SpO2:  [92 %-95 %] 95 %    Physical Exam  Vitals and nursing note reviewed.   Constitutional:       Appearance: He is ill-appearing. He is not toxic-appearing.   HENT:      Head: Normocephalic.   Eyes:      Extraocular Movements: Extraocular movements intact.   Cardiovascular:      Rate and Rhythm: Normal rate and regular rhythm.      Heart sounds: Normal heart sounds.   Pulmonary:      Effort: Pulmonary effort is normal.      Breath sounds: Normal breath sounds.   Abdominal:      General: Bowel sounds are normal.      Palpations: Abdomen is soft.   Genitourinary:     Comments: Wound to tip of glans likely due to pressure from krueger tube. Also with 2 fluid filled blisters in inner thigh, suspect from friction, will monitor   Musculoskeletal:      Cervical back: Normal range of motion and neck supple.      Right lower leg: Edema present.      Left lower leg: Edema present.      Comments: 2-3+ pitting edema bilaterally    Skin:     General: Skin is warm.      Capillary Refill: Capillary refill takes 2 to 3 seconds.   Neurological:      Mental Status: He is alert. Mental status is at baseline. He is disoriented.   Psychiatric:      Comments: Withdrawn, flat        Fluids    Intake/Output Summary (Last 24 hours) at 8/20/2022 1546  Last data filed at 8/20/2022 1100  Gross per 24 hour   Intake 728 ml   Output 0 ml   Net 728 ml       Laboratory  Recent Labs     08/18/22 0229 08/19/22  0353 08/20/22  0336   WBC 7.6 7.7 6.7   RBC 3.78* 3.87* 3.87*   HEMOGLOBIN 11.6* 11.9* 11.9*   HEMATOCRIT 34.9* 36.7* 36.0*   MCV 92.3 94.8 93.0   MCH 30.7 30.7 30.7   MCHC 33.2* 32.4* 33.1*   RDW 42.5 43.3 41.5   PLATELETCT 345 357 331   MPV 10.4 10.0 10.0     Recent Labs     08/18/22 0229 08/19/22  0353 08/20/22  0336   SODIUM 141 142 141   POTASSIUM 3.9 3.8 3.6   CHLORIDE 111 112 110   CO2 18* 18* 20   GLUCOSE 83 114* 118*   BUN 16 20 18   CREATININE 0.90 1.04 1.01   CALCIUM 9.0 8.7 8.5                    Imaging  No orders to display          Assessment/Plan  * UTI (urinary tract infection)- (present on admission)  Assessment & Plan  -Recent urine culture positive for Pseudomonas, Enterococcus  -Given a dose of cefepime in the ER,changed to IV Zosyn; switched to Levofloxacin  -catheter was replaced in ER, now on voiding trial   -Follow-up with urology as outpatient, may need to discuss suprapubic cath while inpatient pending voiding trial    COVID-19  Assessment & Plan  - Noted positive as of 8/17/2022  - no respiratory symptoms   -We will need to stay 10 days until COVID test is negative (8/27) prior to transfer back to [facility]   - supportive care   - isolation precautions     GERD (gastroesophageal reflux disease)  Assessment & Plan  Continue omeprazole    Multiple excoriations- (present on admission)  Assessment & Plan  No obvious infection   Monitor   Keep skin hydrated   Q2H turns     Urinary retention- (present on admission)  Assessment & Plan  -Developed urinary retention at last hospitalization last month, started flomax, has urology referral (appt 8/17/22)  -voiding trial, did okay initially, now with some retention, will straight cath for >400 and continue trial  -flomax increased, may take time to reach full effect, trial finasteride   - hold on replacing krueger   - pending clinical course, will discuss suprapubic options with urology on Monday if needed   - avoid anticholinergics     Dementia (HCC)- (present on admission)  Assessment & Plan  -History of Lewy body dementia per medical records.  -Resume Seroquel, donepezil  -Fall, aspiration precautions  - at baseline, A&O x1  - referral for hospice discussion        VTE prophylaxis: SCDs/TEDs

## 2022-08-21 NOTE — HOSPICE
Spoke to ammy Mishra will have hospice discussion Monday 08/22/22 on [facility] [address] between 1-1:30 PM.     Patient approved for community hospice by Dr. Lambert

## 2022-08-21 NOTE — CARE PLAN
The patient is Stable - Low risk of patient condition declining or worsening    Shift Goals  Clinical Goals: comfort, safety. maintain skin integrity, wound mgt  Patient Goals: comfort, rest  Family Goals:     Progress made toward(s) clinical / shift goals: Patient remain safe and comfortable throughout the whole shift. Q2H repositioning to maintain skin integrity. Wound care performed on patient's multiple wounds.      Patient is not progressing towards the following goals: Unable to provide education as patient remains confused throughout the shift although easily redirected.       Problem: Knowledge Deficit - Standard  Goal: Patient and family/care givers will demonstrate understanding of plan of care, disease process/condition, diagnostic tests and medications  Outcome: Not Met

## 2022-08-21 NOTE — CARE PLAN
The patient is Stable - Low risk of patient condition declining or worsening    Shift Goals  Clinical Goals: comfort, maintain skin integrity, safety, increased PO intake, voiding without need for straight cath  Patient Goals: comfort, rest  Family Goals: patient to get back to baseline    Progress made toward(s) clinical / shift goals:  Patient remain safe and comfortable throughout the whole shift. Q2H repositioning performed to maintain skin integrity, wound care performed on patient's various wounds. Patient is observed to have better PO intake today, eating at least 50-75% of meals/snack.     Patient is not progressing towards the following goals: Patient continue to retain urine, bladder scan and PRN straight cath performed. Patient tolerated procedure and verbalized comfort after the straight cath.      Problem: Knowledge Deficit - Standard  Goal: Patient and family/care givers will demonstrate understanding of plan of care, disease process/condition, diagnostic tests and medications  Outcome: Not Met      Problem: Skin Integrity  Goal: Skin integrity is maintained or improved  Outcome: Progressing       Problem: Fall Risk  Goal: Patient will remain free from falls  Outcome: Progressing

## 2022-08-21 NOTE — CARE PLAN
The patient is Stable - Low risk of patient condition declining or worsening    Shift Goals  Clinical Goals: comfort, safety. maintain skin integrity, wound mgt  Patient Goals: comfort, rest  Family Goals: patient to get back to baseline    Progress made toward(s) clinical / shift goals:    Problem: Knowledge Deficit - Standard  Goal: Patient and family/care givers will demonstrate understanding of plan of care, disease process/condition, diagnostic tests and medications  Outcome: Progressing     Problem: Skin Integrity  Goal: Skin integrity is maintained or improved  Outcome: Progressing     Problem: Fall Risk  Goal: Patient will remain free from falls  Outcome: Progressing       Patient is not progressing towards the following goals:

## 2022-08-21 NOTE — PROGRESS NOTES
Hospital Medicine Daily Progress Note    Date of Service  8/21/2022    Chief Complaint  Johann Davidson is a 88 y.o. male admitted 8/14/2022 with urinary catheter problem    Hospital Course  Mr. Johann Davidson is an 88-year-old male with a PMHx of Lewy body dementia, chronic urinary retention with indwelling Redd catheter, DMT2, GERD, HTN, who presented on 8/14/2022 from [facility] due to concerns of purulent discharge around chronic Redd catheter.    Patient is only oriented to self.  Patient has been residing at [facility] due to significant history of dementia.  Patient was brought to the ER due to concerns of purulent discharge around Redd catheter.  Redd catheter was removed at [facility].  Admitting provider was unable to obtain history as patient was altered and was incoherent.  Cognitive baseline on admission was unknown.  Family was not at bedside.  Also noted that patient was in the ER on 8/1 after a ground-level fall and a head injury.  Other recent hospitalization indicated on 7/20 4-7/1931, patient was treated for UTI with urine culture positive for Pseudomonas and Enterococcus and treated with Zosyn.  Patient also noted to have a history of allergy to ciprofloxacin, but this was verified by a family member over the phone that he is NOT allergic to ciprofloxacin.    In ER, patient's Redd catheter was replaced.  Also noted a small ulceration without surrounding erythema on the foreskin was noted.  Wound team was consulted.  Urine analysis came back positive for infection.  Patient was given cefepime in ER and switched to Zosyn thereafter. Cultures growing pseudomonas sensitive to levofloxacin thus he was switched to oral regiment. Discharge back to [facility] was planned however his screening COVID returned positive thus he was not accepted and requires 10 days of quarantine prior to acceptance back.       Interval Problem Update  Patient seen and examined.  Patient resting comfortably,  "confused, continues to sleep most of the day. Continues to have Poor oral intake   - voiding, did well overnight, required straight cath this AM, continue to monitor . straight cath >400. Tamsulosin was increased, finasteride added. Will discuss with urology   - cont. Levofloxacin for pseudomonas UTI (total 7 day course, end date 8/21)  - saturating well on room Air, continue COVID precautions, monitor for any decompensation. Will be \"COVID recovered\" on 8/27  - labs and vitals remain stable   - hospice discussion order placed     Pt medically clear for DC back to memory care vs SNF       I have discussed this patient's plan of care and discharge plan at IDT rounds today with Case Management, Nursing, Nursing leadership, and other members of the IDT team.    Consultants/Specialty  NONE    Code Status  DNAR/DNI    Disposition  Patient is medically cleared pending COVID recovery (8/27)  for discharge.   Anticipate discharge to to skilled nursing facility.  I have placed the appropriate orders for post-discharge needs.    Review of Systems  Review of Systems   Unable to perform ROS: Dementia      Physical Exam  Temp:  [36.2 °C (97.1 °F)-36.6 °C (97.9 °F)] 36.2 °C (97.1 °F)  Pulse:  [68-77] 68  Resp:  [17-19] 19  BP: (101-134)/(59-94) 127/59  SpO2:  [94 %-98 %] 98 %    Physical Exam  Vitals and nursing note reviewed.   Constitutional:       Appearance: He is ill-appearing. He is not toxic-appearing.   HENT:      Head: Normocephalic.   Eyes:      Extraocular Movements: Extraocular movements intact.   Cardiovascular:      Rate and Rhythm: Normal rate and regular rhythm.      Heart sounds: Normal heart sounds.   Pulmonary:      Effort: Pulmonary effort is normal.      Breath sounds: Normal breath sounds.   Abdominal:      General: Bowel sounds are normal.      Palpations: Abdomen is soft.   Genitourinary:     Comments: Wound to tip of glans likely due to pressure from krueger tube. Also with 2 fluid filled blisters in inner " thigh, suspect from friction, will monitor   Musculoskeletal:      Cervical back: Normal range of motion and neck supple.      Right lower leg: Edema present.      Left lower leg: Edema present.      Comments: 2-3+ pitting edema bilaterally    Skin:     General: Skin is warm.      Capillary Refill: Capillary refill takes 2 to 3 seconds.   Neurological:      Mental Status: He is alert. Mental status is at baseline. He is disoriented.   Psychiatric:      Comments: Withdrawn, flat        Fluids    Intake/Output Summary (Last 24 hours) at 8/21/2022 1307  Last data filed at 8/21/2022 1215  Gross per 24 hour   Intake 746 ml   Output 1080 ml   Net -334 ml       Laboratory  Recent Labs     08/19/22  0353 08/20/22  0336   WBC 7.7 6.7   RBC 3.87* 3.87*   HEMOGLOBIN 11.9* 11.9*   HEMATOCRIT 36.7* 36.0*   MCV 94.8 93.0   MCH 30.7 30.7   MCHC 32.4* 33.1*   RDW 43.3 41.5   PLATELETCT 357 331   MPV 10.0 10.0     Recent Labs     08/19/22  0353 08/20/22  0336 08/21/22  0546   SODIUM 142 141 139   POTASSIUM 3.8 3.6 4.1   CHLORIDE 112 110 108   CO2 18* 20 21   GLUCOSE 114* 118* 111*   BUN 20 18 15   CREATININE 1.04 1.01 1.02   CALCIUM 8.7 8.5 8.8                   Imaging  No orders to display          Assessment/Plan  * UTI (urinary tract infection)- (present on admission)  Assessment & Plan  -Recent urine culture positive for Pseudomonas, Enterococcus  -Given a dose of cefepime in the ER,changed to IV Zosyn; switched to Levofloxacin  -catheter was replaced in ER, now on voiding trial   -Follow-up with urology as outpatient, may need to discuss suprapubic cath while inpatient pending voiding trial    COVID-19  Assessment & Plan  - Noted positive as of 8/17/2022  - no respiratory symptoms   -We will need to stay 10 days until COVID test is negative (8/27) prior to transfer back to SNF   - supportive care   - isolation precautions     GERD (gastroesophageal reflux disease)  Assessment & Plan  Continue omeprazole    Multiple  excoriations- (present on admission)  Assessment & Plan  No obvious infection   Monitor   Keep skin hydrated   Q2H turns     Urinary retention- (present on admission)  Assessment & Plan  -Developed urinary retention at last hospitalization last month, started flomax, has urology referral (appt 8/17/22)  -voiding trial, did okay initially, now with some retention, will straight cath for >400 and continue trial  -flomax increased, may take time to reach full effect, trial finasteride   - hold on replacing krueger   - pending clinical course, will discuss suprapubic options with urology on Monday if needed   - avoid anticholinergics     Dementia (HCC)- (present on admission)  Assessment & Plan  -History of Lewy body dementia per medical records.  -Resume Seroquel, donepezil  -Fall, aspiration precautions  - at baseline, A&O x1  - referral for hospice discussion        VTE prophylaxis: SCDs/TEDs

## 2022-08-22 NOTE — DISCHARGE PLANNING
Case Management Discharge Planning    Admission Date: 8/14/2022  GMLOS: 4.7  ALOS: 7    6-Clicks ADL Score: 13  6-Clicks Mobility Score: 10  PT and/or OT Eval ordered: Yes  Post-acute Referrals Ordered: Yes  Post-acute Choice Obtained: Yes  Has referral(s) been sent to post-acute provider:  Yes      Anticipated Discharge Dispo: Discharge Disposition: Disch to a long term care facility (63)  Discharge Contact Phone Number: [phone]    DME Needed: No    Action(s) Taken: Updated Provider/Nurse on Discharge Plan, DC Assessment Complete (See below), and OTHER    Escalations Completed: None    Medically Clear: No    Next Steps: Rev'd chart and pt discussed in IDT rounds. Acceptance to [facility] received, and [facility], however patient tested positive for Covid 8/17/22 and requires 10 day recovery period ending 8/27 prior to DC to SNF.     Per staff in rounds, pt lives at [facility] and pt's family wants him to return to there. Pt at baseline is wc bound and does not ambulate.   Nursing and MD to start bladder scans, and pt may needs a krueger placed. Pt may need a suprapubic placed.     Spoke to [facility] admissions director, Tyrell Espinoza, ([phone]), to see how  long their positive covid test wait is and to see if they can take pt with a krueger.   They require a 5 day wait after a positive Covid test. Also, if pt discharges with a krueger then either HH or hospice will need to be set up. Tyrell states they have used [facility] in the past and would like to work with them in the future.     Also Tyrell states he rec'd an email from pt's son that he is considering hospice. Rev'd with Dr Toledo and she has ordered for the hospice discussion to be completed.  Once that is discussed, then she will place hospice order.    Await further discussion for dc plans.        Barriers to Discharge: Medical clearance, Pending Placement, Pending Insurance Authorization, and DME    Is the patient up for  discharge tomorrow: No

## 2022-08-22 NOTE — CARE PLAN
The patient is Stable - Low risk of patient condition declining or worsening    Shift Goals  Clinical Goals: comfort, maintain skin integrity, safety, increased PO intake, voiding without need for straight cath  Patient Goals: comfort, rest  Family Goals: patient to get back to baseline    Progress made toward(s) clinical / shift goals:    Problem: Knowledge Deficit - Standard  Goal: Patient and family/care givers will demonstrate understanding of plan of care, disease process/condition, diagnostic tests and medications  Outcome: Not Progressing       Patient is not progressing towards the following goals:      Problem: Knowledge Deficit - Standard  Goal: Patient and family/care givers will demonstrate understanding of plan of care, disease process/condition, diagnostic tests and medications  Outcome: Not Progressing

## 2022-08-22 NOTE — PROGRESS NOTES
Patient A&O1, has difficulty maintaining attention, no ambulation, denies any new concerns or complaints. Patient wounds cleaned. Turned multiple times.

## 2022-08-22 NOTE — PROGRESS NOTES
Hospital Medicine Daily Progress Note    Date of Service  8/22/2022    Chief Complaint  Johann Davidson is a 88 y.o. male admitted 8/14/2022 with urinary catheter problem    Hospital Course  Mr. Johann Davidson is an 88-year-old male with a PMHx of Lewy body dementia, chronic urinary retention with indwelling Redd catheter, DMT2, GERD, HTN, who presented on 8/14/2022 from [facility] due to concerns of purulent discharge around chronic Redd catheter.    Patient is only oriented to self.  Patient has been residing at [facility] due to significant history of dementia.  Patient was brought to the ER due to concerns of purulent discharge around Redd catheter.  Redd catheter was removed at [facility].  Admitting provider was unable to obtain history as patient was altered and was incoherent.  Cognitive baseline on admission was unknown.  Family was not at bedside.  Also noted that patient was in the ER on 8/1 after a ground-level fall and a head injury.  Other recent hospitalization indicated on 7/20 [birth year]/1931, patient was treated for UTI with urine culture positive for Pseudomonas and Enterococcus and treated with Zosyn.  Patient also noted to have a history of allergy to ciprofloxacin, but this was verified by a family member over the phone that he is NOT allergic to ciprofloxacin.    In ER, patient's Redd catheter was replaced.  Also noted a small ulceration without surrounding erythema on the foreskin was noted.  Wound team was consulted.  Urine analysis came back positive for infection.  Patient was given cefepime in ER and switched to Zosyn thereafter. Cultures growing pseudomonas sensitive to levofloxacin thus he was switched to oral regiment. Discharge back to [facility] was planned however his screening COVID returned positive thus he was not accepted and requires 10 days of quarantine prior to acceptance back.       Interval Problem Update  Patient seen and examined. Resting comfortable.   -  "continues to intermittently retain, will need krueger to be placed, given plan is to go with hospice do not feel that suprapubic is necessary. Will attempt to place smallest [place] possible to avoid further trauma and use of lidocaine jelly to reduce discomfort   - hospice referral placed, [facility] by son   - completed abx for UTI   - saturating well on room Air, continue COVID precautions, monitor for any decompensation. Will be \"COVID recovered\" on 8/27      Pt medically clear for DC, will DC with hospice, pt son unsure if he wants him back at [place] with hospice vs [facility]. Hospice will provide list of GH for discussion.       I have discussed this patient's plan of care and discharge plan at IDT rounds today with Case Management, Nursing, Nursing leadership, and other members of the IDT team.    Consultants/Specialty  NONE    Code Status  DNAR/DNI    Disposition  Patient is medically cleared pending COVID recovery (8/27)  for discharge.   Anticipate discharge to to hospice.  I have placed the appropriate orders for post-discharge needs.    Review of Systems  Review of Systems   Unable to perform ROS: Dementia      Physical Exam  Temp:  [36 °C (96.8 °F)-36.4 °C (97.5 °F)] 36 °C (96.8 °F)  Pulse:  [63-84] 70  Resp:  [18] 18  BP: (111-131)/(66-76) 112/68  SpO2:  [94 %-98 %] 94 %    Physical Exam  Vitals and nursing note reviewed.   Constitutional:       Appearance: He is ill-appearing. He is not toxic-appearing.   HENT:      Head: Normocephalic.   Eyes:      Extraocular Movements: Extraocular movements intact.   Cardiovascular:      Rate and Rhythm: Normal rate and regular rhythm.      Heart sounds: Normal heart sounds.   Pulmonary:      Effort: Pulmonary effort is normal.      Breath sounds: Normal breath sounds.   Abdominal:      General: Bowel sounds are normal.      Palpations: Abdomen is soft.   Genitourinary:     Comments: Wound to tip of glans likely due to pressure from krueger tube. Also with 2 " fluid filled blisters in inner thigh, suspect from friction, will monitor   Musculoskeletal:      Cervical back: Normal range of motion and neck supple.      Right lower leg: Edema present.      Left lower leg: Edema present.      Comments: 2-3+ pitting edema bilaterally    Skin:     General: Skin is warm.      Capillary Refill: Capillary refill takes 2 to 3 seconds.   Neurological:      Mental Status: He is alert. Mental status is at baseline. He is disoriented.   Psychiatric:      Comments: Withdrawn, flat        Fluids    Intake/Output Summary (Last 24 hours) at 8/22/2022 1458  Last data filed at 8/21/2022 1821  Gross per 24 hour   Intake 18 ml   Output 0 ml   Net 18 ml       Laboratory  Recent Labs     08/20/22  0336   WBC 6.7   RBC 3.87*   HEMOGLOBIN 11.9*   HEMATOCRIT 36.0*   MCV 93.0   MCH 30.7   MCHC 33.1*   RDW 41.5   PLATELETCT 331   MPV 10.0     Recent Labs     08/20/22  0336 08/21/22  0546 08/22/22  0336   SODIUM 141 139 138   POTASSIUM 3.6 4.1 3.7   CHLORIDE 110 108 106   CO2 20 21 22   GLUCOSE 118* 111* 91   BUN 18 15 12   CREATININE 1.01 1.02 0.86   CALCIUM 8.5 8.8 8.4*                   Imaging  No orders to display          Assessment/Plan  * UTI (urinary tract infection)- (present on admission)  Assessment & Plan  -Recent urine culture positive for Pseudomonas, Enterococcus  -Given a dose of cefepime in the ER,changed to IV Zosyn; switched to Levofloxacin completed 8/21  -catheter was replaced in ER  - failed voiding trial, krueger order replaced     COVID-19  Assessment & Plan  - Noted positive as of 8/17/2022  - no respiratory symptoms   -We will need to stay 10 days until COVID test is negative (8/27) prior to transfer back to SNF   - supportive care   - isolation precautions     GERD (gastroesophageal reflux disease)  Assessment & Plan  Continue omeprazole    Multiple excoriations- (present on admission)  Assessment & Plan  No obvious infection   Monitor   Keep skin hydrated   Q2H turns      Urinary retention- (present on admission)  Assessment & Plan  -Developed urinary retention at last hospitalization last month, started flomax, has urology referral (appt 8/17/22)  -voiding trial failed depsite multiple day attempt with additional of medication   - krueger replacement ordered   - can discuss suprapubic given pt discomfort however with hospice elected unsure if suprapubic is necessary   - avoid anticholinergics     Dementia (HCC)- (present on admission)  Assessment & Plan  -History of Lewy body dementia per medical records.  -Resume Seroquel, donepezil  -Fall, aspiration precautions  - at baseline, A&O x1  - referral to hospice       VTE prophylaxis: SCDs/TEDs

## 2022-08-22 NOTE — CARE PLAN
The patient is Stable - Low risk of patient condition declining or worsening    Shift Goals  Clinical Goals: comfort, maintain skin integrity, safety, increased PO intake, voiding without need for straight cath  Patient Goals: comfort, rest  Family Goals: patient to get back to baseline    Progress made toward(s) clinical / shift goals:  Progress    Patient is not progressing towards the following goals:

## 2022-08-23 NOTE — HOSPICE
Met with patients ammy Mishra, hospice philosophy went over, comfort focus care,   DME, medication, and staff roles.     Consents signed for [facility]   Dr. Toledo made aware

## 2022-08-23 NOTE — HOSPICE
Follow up with son Tad who states that he went and looked at [facility] today and if possible he would like to have his father transferred there. Reached out to owner Lorna and fax number to [facility] given to her so that she can send over paperwork for her to come and assess patient. Chase sent to Dr Arthur Vazquez to order a Quantiferon. Return call to son Tad to give and update.

## 2022-08-23 NOTE — DISCHARGE PLANNING
Case Management Discharge Planning    Admission Date: 8/14/2022  GMLOS: 4.7  ALOS: 8    6-Clicks ADL Score: 13  6-Clicks Mobility Score: 10  PT and/or OT Eval ordered: Yes  Post-acute Referrals Ordered: Yes  Post-acute Choice Obtained: Yes  Has referral(s) been sent to post-acute provider:  Yes      Anticipated Discharge Dispo: Discharge Disposition: Disch to a long term care facility (63)  Discharge Contact Phone Number: [phone]    DME Needed: No    Action(s) Taken: Updated Provider/Nurse on Discharge Plan, Referral(s) sent, and OTHER    Escalations Completed: None    Medically Clear: Yes    Next Steps: Grace, GH owner from 3 Group homes ([facility], [facility], and [facility]) sent by pt's son Tad as Tad wants to move his father from [place] to a different  group [place]. Grace to complete her assessment and then f/u with case mngt and pt's son.    Referral placed this AM to [facility]. Call placed to Emilia at [phone] and she will have the covering nurse f/u with case mngt.     Barriers to Discharge: Pending Placement    Is the patient up for discharge tomorrow: No    Addendum at 13:45-[facility] RN has accepted pt and met with dtr and signed consents for hospice. Kait to reach out to the Group home owner Grace and the family for additional plans needed.     Addendum at 14:15-pt's sonnereyda Mishra likes a different group home than the properties Grace has. [facility]. Shonna to have [facility] come to the hospital to eval pt.

## 2022-08-23 NOTE — DIETARY
Nutrition Note:  Patient seen for weekly nutrition screen.  Patient eating 25-50% of meals per CNA. Added Boost supplement for patient to try. D/W CNA who will be assisting with lunch to let dietary know if patient likes it.     Patient and son have elected for hospice.    Nutrition Representative will continue to obtain ongoing meal and snack preferences as possible.  RD following per department policy.

## 2022-08-23 NOTE — CARE PLAN
The patient is Watcher - Medium risk of patient condition declining or worsening    Shift Goals  Clinical Goals: maintain skin integrity  Patient Goals: comfort, rest  Family Goals: patient to get back to baseline    Progress made toward(s) clinical / shift goals:  pt on q2h turns, dressing changes per policy, Qshift skin assessment. Redd catheter, frequent incontinence checks with PRN bed linen changes.       Problem: Skin Integrity  Goal: Skin integrity is maintained or improved  Outcome: Progressing     Problem: Knowledge Deficit - Standard  Goal: Patient and family/care givers will demonstrate understanding of plan of care, disease process/condition, diagnostic tests and medications  Outcome: Progressing     Problem: Fall Risk  Goal: Patient will remain free from falls  Outcome: Progressing     Problem: Pain - Standard  Goal: Alleviation of pain or a reduction in pain to the patient’s comfort goal  Outcome: Progressing

## 2022-08-23 NOTE — PROGRESS NOTES
Pt confused, alert/oriented to self. New PIV placed RFA. Premedicated with IV morphine, krueger catheter (12 fr.) inserted, draining clear yellow urine. Pt on q2h turns. Call light within reach, personal belongings available, bed in lowest position, treaded socks on, heel float boots, and bed alarm on. Hourly rounding in place.

## 2022-08-23 NOTE — PROGRESS NOTES
Patient A&O to self, appears lethargic and confused. Patient rotated q2hr, dressings changed, complete linens changed. Redd care provided. Encouraging fluid intake. Informed provider of increased stiffness in extremeties. PT eval and treatment ordered.

## 2022-08-23 NOTE — DISCHARGE PLANNING
Received Choice form at 0902  Agency/Facility Name: [facility]  Referral sent per Choice form @ 8568    @0570  Agency/Facility Name: [facility]  Spoke To: Emilia  Outcome: Emilia will have the nurse call this DPA.

## 2022-08-23 NOTE — PROGRESS NOTES
Hospital Medicine Daily Progress Note    Date of Service  8/23/2022    Chief Complaint  Johann Davidson is a 88 y.o. male admitted 8/14/2022 with UTI    Hospital Course  Mr. Johann Davidson is an 88-year-old male with a PMHx of Lewy body dementia, chronic urinary retention with indwelling Redd catheter, DMT2, GERD, HTN, who presented on 8/14/2022 from [facility] due to concerns of purulent discharge around chronic Redd catheter.    Patient is only oriented to self.  Patient has been residing at [facility] due to significant history of dementia.  Patient was brought to the ER due to concerns of purulent discharge around Redd catheter.  Redd catheter was removed at [facility].  Admitting provider was unable to obtain history as patient was altered and was incoherent.  Cognitive baseline on admission was unknown.  Family was not at bedside.  Also noted that patient was in the ER on 8/1 after a ground-level fall and a head injury.  Other recent hospitalization indicated on 7/20 4-7/1931, patient was treated for UTI with urine culture positive for Pseudomonas and Enterococcus and treated with Zosyn.  Patient also noted to have a history of allergy to ciprofloxacin, but this was verified by a family member over the phone that he is NOT allergic to ciprofloxacin.    In ER, patient's Redd catheter was replaced.  Also noted a small ulceration without surrounding erythema on the foreskin was noted.  Wound team was consulted.  Urine analysis came back positive for infection.  Patient was given cefepime in ER and switched to Zosyn thereafter. Cultures growing pseudomonas sensitive to levofloxacin thus he was switched to oral regiment. Discharge back to [facility] was planned however his screening COVID returned positive thus he was not accepted and requires 10 days of quarantine prior to acceptance back.     Interval Problem Update  Vitals remained stable.  Patient is confused  Follows some command.  Looks  lethargic on exam  Waiting for hospice placement  Significant significant morbidities, dementia, ongoing COVID-19 infection will need to discuss with patient's son regarding transition to comfort care.      Addendum  I had extensive discussion with patient's on regarding transition to comfort care.  Patient's son's like to continue hospice management for now and will consider comfort care in future.      I have discussed this patient's plan of care and discharge plan at IDT rounds today with Case Management, Nursing, Nursing leadership, and other members of the IDT team.    Consultants/Specialty  none    Code Status  DNAR/DNI    Disposition  Patient is not medically cleared for discharge.   Anticipate discharge to to hospice.  I have placed the appropriate orders for post-discharge needs.    Review of Systems  Review of Systems   Unable to perform ROS: Acuity of condition      Physical Exam  Temp:  [36.1 °C (96.9 °F)-36.6 °C (97.8 °F)] 36.1 °C (96.9 °F)  Pulse:  [63-69] 63  Resp:  [16-18] 18  BP: (114-135)/(50-75) 135/50  SpO2:  [96 %-98 %] 96 %    Physical Exam  Constitutional:       General: He is not in acute distress.  HENT:      Head: Normocephalic and atraumatic.      Right Ear: Tympanic membrane normal.      Left Ear: Tympanic membrane normal.      Nose: Nose normal.      Mouth/Throat:      Mouth: Mucous membranes are moist.   Eyes:      Extraocular Movements: Extraocular movements intact.      Pupils: Pupils are equal, round, and reactive to light.   Cardiovascular:      Rate and Rhythm: Normal rate and regular rhythm.      Pulses: Normal pulses.   Pulmonary:      Effort: Pulmonary effort is normal. No respiratory distress.   Abdominal:      General: Abdomen is flat. There is no distension.   Genitourinary:     Comments: On krueger   Musculoskeletal:      Cervical back: Normal range of motion.      Right lower leg: No edema.      Left lower leg: No edema.   Skin:     General: Skin is warm.   Neurological:       Mental Status: He is alert. Mental status is at baseline.   Psychiatric:         Mood and Affect: Mood normal.       Fluids    Intake/Output Summary (Last 24 hours) at 8/23/2022 1519  Last data filed at 8/23/2022 0609  Gross per 24 hour   Intake --   Output 525 ml   Net -525 ml       Laboratory      Recent Labs     08/21/22  0546 08/22/22  0336   SODIUM 139 138   POTASSIUM 4.1 3.7   CHLORIDE 108 106   CO2 21 22   GLUCOSE 111* 91   BUN 15 12   CREATININE 1.02 0.86   CALCIUM 8.8 8.4*                   Imaging  No orders to display        Assessment/Plan  * UTI (urinary tract infection)- (present on admission)  Assessment & Plan  -Recent urine culture positive for Pseudomonas, Enterococcus  -Given a dose of cefepime in the ER,changed to IV Zosyn; switched to Levofloxacin completed 8/21  -catheter was replaced in ER  - failed voiding trial, krueger order replaced     COVID-19  Assessment & Plan  - Noted positive as of 8/17/2022  - no respiratory symptoms   -We will need to stay 10 days until COVID test is negative (8/27) prior to transfer back to [facility]   - supportive care   - isolation precautions     GERD (gastroesophageal reflux disease)  Assessment & Plan  Continue omeprazole    Multiple excoriations- (present on admission)  Assessment & Plan  No obvious infection   Monitor   Keep skin hydrated   Q2H turns     Urinary retention- (present on admission)  Assessment & Plan  -Developed urinary retention at last hospitalization last month, started flomax, has urology referral (appt 8/17/22)  -voiding trial failed depsite multiple day attempt with additional of medication   - krueger replacement ordered   - can discuss suprapubic given pt discomfort however with hospice elected unsure if suprapubic is necessary   - avoid anticholinergics     Dementia (HCC)- (present on admission)  Assessment & Plan  -History of Lewy body dementia per medical records.  -Resume Seroquel, donepezil  -Fall, aspiration precautions  - at baseline, A&O  x1  - referral to hospice         VTE prophylaxis: SCDs/TEDs Lovenox,     I have performed a physical exam and reviewed and updated ROS and Plan today (8/23/2022). In review of yesterday's note (8/22/2022), there are no changes except as documented above.

## 2022-08-24 NOTE — PROGRESS NOTES
Hospital Medicine Daily Progress Note    Date of Service  8/24/2022  Chief Complaint  Johann Davisdon is a 88 y.o. male admitted 8/14/2022 with UTI     Hospital Course  Mr. Johann Davidson is an 88-year-old male with a PMHx of Lewy body dementia, chronic urinary retention with indwelling Redd catheter, DMT2, GERD, HTN, who presented on 8/14/2022 from [facility] due to concerns of purulent discharge around chronic Redd catheter.     Patient is only oriented to self.  Patient has been residing at [facility] due to significant history of dementia.  Patient was brought to the ER due to concerns of purulent discharge around Redd catheter.  Redd catheter was removed at [facility].  Admitting provider was unable to obtain history as patient was altered and was incoherent.  Cognitive baseline on admission was unknown.  Family was not at bedside.  Also noted that patient was in the ER on 8/1 after a ground-level fall and a head injury.  Other recent hospitalization indicated on 7/20 4-7/1931, patient was treated for UTI with urine culture positive for Pseudomonas and Enterococcus and treated with Zosyn.  Patient also noted to have a history of allergy to ciprofloxacin, but this was verified by a family member over the phone that he is NOT allergic to ciprofloxacin.     In ER, patient's Redd catheter was replaced.  Also noted a small ulceration without surrounding erythema on the foreskin was noted.  Wound team was consulted.  Urine analysis came back positive for infection.  Patient was given cefepime in ER and switched to Zosyn thereafter. Cultures growing pseudomonas sensitive to levofloxacin thus he was switched to oral regiment. Discharge back to [facility] was planned however his screening COVID returned positive thus he was not accepted and requires 10 days of quarantine prior to acceptance back.      Interval Problem Update  Vitals remained stable.  Patient is confused  Follows some command.  Looks  lethargic on exam  Waiting for hospice placement  Significant significant morbidities, dementia, ongoing COVID-19 infection will need to discuss with patient's son regarding transition to comfort care.        Addendum  I had extensive discussion with patient's on regarding transition to comfort care.  Patient's son's like to continue hospice management for now and will consider comfort care in future.        8/24/2022  Vitals remained stable.  Alert  Disoriented.  Follows command  COVID-19 on room air,  Waiting for hospice placement  Continue pain management    I have discussed this patient's plan of care and discharge plan at IDT rounds today with Case Management, Nursing, Nursing leadership, and other members of the IDT team.     Consultants/Specialty  none     Code Status  DNAR/DNI     Disposition  Patient is not medically cleared for discharge.   Anticipate discharge to to hospice.  I have placed the appropriate orders for post-discharge needs.     Review of Systems  Review of Systems   Unable to perform ROS: Acuity of condition       Physical Exam  Temp:  [36.1 °C (96.9 °F)-36.6 °C (97.8 °F)] 36.1 °C (96.9 °F)  Pulse:  [63-69] 63  Resp:  [16-18] 18  BP: (114-135)/(50-75) 135/50  SpO2:  [96 %-98 %] 96 %     Physical Exam  Constitutional:       General: He is not in acute distress.  HENT:      Head: Normocephalic and atraumatic.      Right Ear: Tympanic membrane normal.      Left Ear: Tympanic membrane normal.      Nose: Nose normal.      Mouth/Throat:      Mouth: Mucous membranes are moist.   Eyes:      Extraocular Movements: Extraocular movements intact.      Pupils: Pupils are equal, round, and reactive to light.   Cardiovascular:      Rate and Rhythm: Normal rate and regular rhythm.      Pulses: Normal pulses.   Pulmonary:      Effort: Pulmonary effort is normal. No respiratory distress.   Abdominal:      General: Abdomen is flat. There is no distension.   Genitourinary:     Comments: On pati    Musculoskeletal:      Cervical back: Normal range of motion.      Right lower leg: No edema.      Left lower leg: No edema.   Skin:     General: Skin is warm.   Neurological:      Mental Status: He is alert. Mental status is at baseline.   Psychiatric:         Mood and Affect: Mood normal.         Fluids    Intake/Output Summary (Last 24 hours) at 8/24/2022 1232  Last data filed at 8/24/2022 1000  Gross per 24 hour   Intake 700 ml   Output 550 ml   Net 150 ml       Laboratory      Recent Labs     08/22/22  0336   SODIUM 138   POTASSIUM 3.7   CHLORIDE 106   CO2 22   GLUCOSE 91   BUN 12   CREATININE 0.86   CALCIUM 8.4*                   Imaging  No orders to display        Assessment/Plan  * UTI (urinary tract infection)- (present on admission)  Assessment & Plan  -Recent urine culture positive for Pseudomonas, Enterococcus  -Given a dose of cefepime in the ER,changed to IV Zosyn; switched to Levofloxacin completed 8/21  -catheter was replaced in ER  - failed voiding trial, krueger order replaced     COVID-19  Assessment & Plan  - Noted positive as of 8/17/2022  - no respiratory symptoms   -We will need to stay 10 days until COVID test is negative (8/27) prior to transfer back to SNF   - supportive care   - isolation precautions     GERD (gastroesophageal reflux disease)  Assessment & Plan  Continue omeprazole    Multiple excoriations- (present on admission)  Assessment & Plan  No obvious infection   Monitor   Keep skin hydrated   Q2H turns     Urinary retention- (present on admission)  Assessment & Plan  -Developed urinary retention at last hospitalization last month, started flomax, has urology referral (appt 8/17/22)  -voiding trial failed depsite multiple day attempt with additional of medication   - krueger replacement ordered   - can discuss suprapubic given pt discomfort however with hospice elected unsure if suprapubic is necessary   - avoid anticholinergics     Dementia (HCC)- (present on admission)  Assessment  & Plan  -History of Lewy body dementia per medical records.  -Resume Seroquel, donepezil  -Fall, aspiration precautions  - at baseline, A&O x1  - referral to hospice         VTE prophylaxis: SCDs/TEDs and enoxaparin ppx    I have performed a physical exam and reviewed and updated ROS and Plan today (8/24/2022). In review of yesterday's note (8/23/2022), there are no changes except as documented above.

## 2022-08-24 NOTE — DISCHARGE PLANNING
Case Management Discharge Planning    Admission Date: 8/14/2022  GMLOS: 4.7  ALOS: 9    6-Clicks ADL Score: 13  6-Clicks Mobility Score: 10  PT and/or OT Eval ordered: Yes  Post-acute Referrals Ordered: Yes  Post-acute Choice Obtained: Yes  Has referral(s) been sent to post-acute provider:  Yes      Anticipated Discharge Dispo: Discharge Disposition: D/T to hospice home (50)  Discharge Contact Phone Number: [phone]    DME Needed: No    Action(s) Taken: Updated Provider/Nurse on Discharge Plan and OTHER    Escalations Completed: None    Medically Clear: Yes    Next Steps: Rec'd faxed group home paperwork physician's required paperwork from  [facility] and gave to Dr Vazquez to complete. TB test has been ordered.   When close to dc-will need Covid PCR test completed.     Barriers to Discharge: Pending Placement and Transportation    Is the patient up for discharge tomorrow: No    Addendum at 11:40:     Admissions Forms packet, including Physician's paperwork completed by Dr Vazquez, POLST, Adv Dir, insurance cards, and face sheet faxed to [facility] (phone [phone], fax [phone]) to Diane Villanueva.     [facility], [address] [place] [address], [place] [address].   phone [phone], fax [phone]) to Diane Villanueva.     Addendum at 12:30- Diane from [facility] will evaluate patient this afternoon for admission to [facility]. [facility] pending. Diane is requesting discharge on Monday as they do not accept admissions on Friday.

## 2022-08-24 NOTE — CARE PLAN
The patient is Stable - Low risk of patient condition declining or worsening    Shift Goals  Clinical Goals: Maintain skin integrity  Patient Goals: Comfort  Family Goals: MAMI    Progress made toward(s) clinical / shift goals: Pt changed and repositioned in bed. No signs of pain or distress at this time. Pt resting in bed with bed alarm active and safety precautions in place.      Problem: Knowledge Deficit - Standard  Goal: Patient and family/care givers will demonstrate understanding of plan of care, disease process/condition, diagnostic tests and medications  Outcome: Progressing     Problem: Skin Integrity  Goal: Skin integrity is maintained or improved  Outcome: Progressing     Problem: Fall Risk  Goal: Patient will remain free from falls  Outcome: Progressing

## 2022-08-24 NOTE — CARE PLAN
The patient is Stable - Low risk of patient condition declining or worsening    Shift Goals  Clinical Goals: maintain skin integrity  Patient Goals: rest  Family Goals: MAMI    Progress made toward(s) clinical / shift goals:  Patient has remained free from falls throughout this shift. Skin has been assessed, and no deterioration has been noted. Patient has been resting calmly in bed throughout the shift, with respirations unlabored and vital signs stable.     Problem: Fall Risk  Goal: Patient will remain free from falls  Outcome: Progressing     Problem: Pain - Standard  Goal: Alleviation of pain or a reduction in pain to the patient’s comfort goal  Outcome: Progressing       Patient is not progressing towards the following goals:

## 2022-08-24 NOTE — HOSPICE
Spoke with Diane from [facility]. She will evaluate patient this afternoon for admission to [facility]. [facility] pending. Diane is requesting discharge on Monday as they do not accept admissions on Friday. Ruchi (d/c planner) updated.

## 2022-08-24 NOTE — PROGRESS NOTES
Bedside report received. Assumed care of patient this AM. Assessment completed      Patient is A&O x 2, oriented to self and place, patient was able to tell RN that he was in a hospital, pt does not call for assistance appropriately  Pt appears comfortable once repositioned, no prn medication administered.  Pt is at basline on room air  Mobility bedfast, max full assist   Bed alarm in use.  Voiding +  Flatus +            Plan of care reviewed with the patient. Bed is locked and in the lowest position. Call light is within reach. Patient encouraged to voice needs and concerns, all needs met at this time. Hourly rounding in place.

## 2022-08-24 NOTE — PROGRESS NOTES
Pt is A&O x1 at baseline. Pt catheter and skin assessed. No c/o or signs of pain at this time. Q2hr turns, urinary catheter draining yellow, clear urine. Pt resting in bed w/ call light in reach and safety precautions in place.

## 2022-08-24 NOTE — THERAPY
Physical Therapy Contact Note    Patient Name: Johann Davidson  Age:  88 y.o., Sex:  male  Medical Record #: [id number]  Today's Date: 8/24/2022    PT consult received & chart reviewed.  Per chart review & RN pt is dependent for ADLs and mobility at baseline, A&O x 1, has hx of dementia and was living in memory care prior to admit.  Pt with minimal capacity for new learning & has minimal engagement.  Per previous acute PT evaluations pt max-total A for mobility.  Due to current medical status plan is to DC to group home on hospice, possible transition to comfort care noted in chart.  Spoke with RN regarding new consult, agrees that acute PT evaluation not indicated.  Will complete order.    Samira Davis, PT, DPT

## 2022-08-24 NOTE — INFECTION CONTROL
This patient is considered COVID RECOVERED.    Patient initially tested positive for COVID on 8/17/2022 (for placement) CT cycle threshold 37.1 indicates likely an older infection and is asymptomatic.  Patient is greater than or equal to 10 days from symptom onset and/or positive test, with symptom improvement.  Per the CDC guidance, this patient no longer requires transmission based precautions.  Patient may be placed on any unit per the bed assignment policy, including placement in a semi-private room with a roommate.

## 2022-08-25 NOTE — PROGRESS NOTES
Bedside report received. Assumed care of patient this morning. Assessment completed      Patient is A&O to self only  Reports appears comfortable in certain position  Pt is on RA    Mobility: Bed bound Bed alarm in use .  Voiding + krueger output   Flatus +  Bed bath completed    Plan of care reviewed with the patient. Bed is locked and in the lowest position. Call light is within reach. Patient encouraged to voice needs and concerns, all needs met at this time. Hourly rounding in place.

## 2022-08-25 NOTE — CARE PLAN
The patient is Stable - Low risk of patient condition declining or worsening    Shift Goals  Clinical Goals: Maintain skin integrity  Patient Goals: Rest, comfort  Family Goals: MAMI    Progress made toward(s) clinical / shift goals: Pt wounds photographed and dressings reapplied. Pt remains free of falls with bed alarm on and safety precautions in place.      Problem: Skin Integrity  Goal: Skin integrity is maintained or improved  Outcome: Progressing     Problem: Fall Risk  Goal: Patient will remain free from falls  Outcome: Progressing     Problem: Pain - Standard  Goal: Alleviation of pain or a reduction in pain to the patient’s comfort goal  Outcome: Progressing

## 2022-08-25 NOTE — PROGRESS NOTES
Pt is A&O x 1 at baseline. Pt wounds assessed and documented and dressings reapplied. Q2 hr turns, urinary catheter draining yellow urine. No c/o or signs of pain at this time. Pt resting in bed with bed alarm on, safety precautions in place.

## 2022-08-25 NOTE — PROGRESS NOTES
Hospital Medicine Daily Progress Note    Date of Service  8/25/2022  Chief Complaint  Johann Davidson is a 88 y.o. male admitted 8/14/2022 with UTI     Hospital Course  Mr. Johann Davidson is an 88-year-old male with a PMHx of Lewy body dementia, chronic urinary retention with indwelling Redd catheter, DMT2, GERD, HTN, who presented on 8/14/2022 from [facility] due to concerns of purulent discharge around chronic Redd catheter.     Patient is only oriented to self.  Patient has been residing at [facility] due to significant history of dementia.  Patient was brought to the ER due to concerns of purulent discharge around Redd catheter.  Redd catheter was removed at [facility].  Admitting provider was unable to obtain history as patient was altered and was incoherent.  Cognitive baseline on admission was unknown.  Family was not at bedside.  Also noted that patient was in the ER on 8/1 after a ground-level fall and a head injury.  Other recent hospitalization indicated on 7/20 4-7/1931, patient was treated for UTI with urine culture positive for Pseudomonas and Enterococcus and treated with Zosyn.  Patient also noted to have a history of allergy to ciprofloxacin, but this was verified by a family member over the phone that he is NOT allergic to ciprofloxacin.     In ER, patient's Redd catheter was replaced.  Also noted a small ulceration without surrounding erythema on the foreskin was noted.  Wound team was consulted.  Urine analysis came back positive for infection.  Patient was given cefepime in ER and switched to Zosyn thereafter. Cultures growing pseudomonas sensitive to levofloxacin thus he was switched to oral regiment. Discharge back to [facility] was planned however his screening COVID returned positive thus he was not accepted and requires 10 days of quarantine prior to acceptance back.  Plan to discharge to group home with hospice on Monday.     Interval Problem Update  Vitals remained  stable.  Patient is confused  Follows some command.  Looks lethargic on exam  Waiting for hospice placement  Significant significant morbidities, dementia, ongoing COVID-19 infection will need to discuss with patient's son regarding transition to comfort care.        Addendum:  I had extensive discussion with patient's on regarding transition to comfort care.  Patient's son's like to continue hospice management for now and will consider comfort care in future.        8/24/2022  Vitals remained stable.  Alert  Disoriented.  Follows command  COVID-19 on room air,  Waiting for hospice placement  Continue pain management       8/25/2022  Vitals remained stable.  On room air, afebrile  He is alert.  Not in acute distress  Pain well controlled  Possible discharge to group home with hospice on Monday   assisting    I have discussed this patient's plan of care and discharge plan at IDT rounds today with Case Management, Nursing, Nursing leadership, and other members of the IDT team.     Consultants/Specialty  none     Code Status  DNAR/DNI     Disposition  Patient is not medically cleared for discharge.   Anticipate discharge to to hospice.  I have placed the appropriate orders for post-discharge needs.     Review of Systems  Review of Systems   Unable to perform ROS: Acuity of condition       Physical Exam  Temp:  [36.1 °C (96.9 °F)-36.6 °C (97.8 °F)] 36.1 °C (96.9 °F)  Pulse:  [63-69] 63  Resp:  [16-18] 18  BP: (114-135)/(50-75) 135/50  SpO2:  [96 %-98 %] 96 %     Physical Exam  Constitutional:       General: He is not in acute distress.  HENT:      Head: Normocephalic and atraumatic.      Right Ear: Tympanic membrane normal.      Left Ear: Tympanic membrane normal.      Nose: Nose normal.      Mouth/Throat:      Mouth: Mucous membranes are moist.   Eyes:      Extraocular Movements: Extraocular movements intact.      Pupils: Pupils are equal, round, and reactive to light.   Cardiovascular:      Rate and  Rhythm: Normal rate and regular rhythm.      Pulses: Normal pulses.   Pulmonary:      Effort: Pulmonary effort is normal. No respiratory distress.   Abdominal:      General: Abdomen is flat. There is no distension.   Genitourinary:     Comments: On krueger   Musculoskeletal:      Cervical back: Normal range of motion.      Right lower leg: No edema.      Left lower leg: No edema.   Skin:     General: Skin is warm.   Neurological:      Mental Status: He is alert. Mental status is at baseline.        Fluids    Intake/Output Summary (Last 24 hours) at 8/25/2022 1258  Last data filed at 8/25/2022 0600  Gross per 24 hour   Intake --   Output 500 ml   Net -500 ml       Laboratory                        Imaging  No orders to display        Assessment/Plan  * UTI (urinary tract infection)- (present on admission)  Assessment & Plan  -Recent urine culture positive for Pseudomonas, Enterococcus  -Given a dose of cefepime in the ER,changed to IV Zosyn; switched to Levofloxacin completed 8/21  -catheter was replaced in ER  - failed voiding trial, krueger order replaced     COVID-19  Assessment & Plan  - Noted positive as of 8/17/2022  - no respiratory symptoms   -We will need to stay 10 days until COVID test is negative (8/27) prior to transfer back to [facility]   - supportive care   - isolation precautions     GERD (gastroesophageal reflux disease)  Assessment & Plan  Continue omeprazole    Multiple excoriations- (present on admission)  Assessment & Plan  No obvious infection   Monitor   Keep skin hydrated   Q2H turns     Urinary retention- (present on admission)  Assessment & Plan  -Developed urinary retention at last hospitalization last month, started flomax, has urology referral (appt 8/17/22)  -voiding trial failed depsite multiple day attempt with additional of medication   - krueger replacement ordered   - can discuss suprapubic given pt discomfort however with hospice elected unsure if suprapubic is necessary   - avoid  anticholinergics     Dementia (HCC)- (present on admission)  Assessment & Plan  -History of Lewy body dementia per medical records.  -Resume Seroquel, donepezil  -Fall, aspiration precautions  - at baseline, A&O x1  - referral to hospice       VTE prophylaxis: SCDs/TEDs and enoxaparin ppx    I have performed a physical exam and reviewed and updated ROS and Plan today (8/25/2022). In review of yesterday's note (8/24/2022), there are no changes except as documented above.

## 2022-08-25 NOTE — CARE PLAN
The patient is Stable - Low risk of patient condition declining or worsening    Shift Goals  Clinical Goals: maintain skin integrity  Patient Goals: Rest. comfort  Family Goals: MAMI    Progress made toward(s) clinical / shift goals:  Patient's skin has been assessed with no deterioration noted in integrity. All wounds have been assessed and dressings changed. Improvement has been noted in blanching time of heels. Patient has been turned every 2 hours. Patient received a full bed bath and a complete linen change. Patient has remained free from falls this shift.     Problem: Skin Integrity  Goal: Skin integrity is maintained or improved  Outcome: Progressing     Problem: Fall Risk  Goal: Patient will remain free from falls  Outcome: Progressing       Patient is not progressing towards the following goals:

## 2022-08-26 NOTE — DISCHARGE PLANNING
Case Management Discharge Planning    Admission Date: 8/14/2022  GMLOS: 4.7  ALOS: 11    6-Clicks ADL Score: 13  6-Clicks Mobility Score: 10  PT and/or OT Eval ordered: Yes  Post-acute Referrals Ordered: Yes  Post-acute Choice Obtained: Yes  Has referral(s) been sent to post-acute provider:  Yes      Anticipated Discharge Dispo: Discharge Disposition: D/T to hospice home (50)  Discharge Contact Phone Number: [phone]    DME Needed: DME to be ordered by hospice and delivered to group home    Action(s) Taken: Physician assessment form received via fax from [facility]. Form needs to be completed and signed for admission to group [facility].    Form completed and signed by MD. Form faxed to [facility] home owner at [phone]. All other physician packet and required forms faxed previously.     Patient to discharge to group [facility] on Monday 8/29. Spoke with Bud with [facility]. They can accept patient at anytime on Monday.     Covid test to be completed prior to discharge and transport to be set up.     Escalations Completed: None    Medically Clear: Yes    Next Steps: Follow up with [facility].     Barriers to Discharge: Group home acceptance on Monday    Is the patient up for discharge tomorrow: No    Addendum:  1430  RN CM called group home owner Diane [phone]. Per Diane they will contact [facility] to get DME delivered. Diane will review all paperwork with her manager to make sure they have everything and call on Monday to confirm what time they can accept the patient. Per Diane tentative time would be [id number] on Monday.     Transportation will need to be set up once [facility] confirms they can accept patient on Monday.

## 2022-08-26 NOTE — PROGRESS NOTES
Hospital Medicine Daily Progress Note    Date of Service  8/26/2022    Chief Complaint  Johann Davidson is a 88 y.o. male admitted 8/14/2022 with urinary tract infection    Hospital Course  Mr. Johann Davidson is an 88-year-old male with a PMHx of Lewy body dementia, chronic urinary retention with indwelling Redd catheter, DMT2, GERD, HTN, who presented on 8/14/2022 from [facility] due to concerns of purulent discharge around chronic Redd catheter.    Patient is only oriented to self.  Patient has been residing at [facility] due to significant history of dementia.  Patient was brought to the ER due to concerns of purulent discharge around Redd catheter.  Redd catheter was removed at [facility].  Admitting provider was unable to obtain history as patient was altered and was incoherent.  Cognitive baseline on admission was unknown.  Family was not at bedside.  Also noted that patient was in the ER on 8/1 after a ground-level fall and a head injury.  Other recent hospitalization indicated on 7/20 4-7/1931, patient was treated for UTI with urine culture positive for Pseudomonas and Enterococcus and treated with Zosyn.  Patient also noted to have a history of allergy to ciprofloxacin, but this was verified by a family member over the phone that he is NOT allergic to ciprofloxacin.    In ER, patient's Redd catheter was replaced.  Also noted a small ulceration without surrounding erythema on the foreskin was noted.  Wound team was consulted.  Urine analysis came back positive for infection.  Patient was given cefepime in ER and switched to Zosyn thereafter. Cultures growing pseudomonas sensitive to levofloxacin thus he was switched to oral regiment. Discharge back to [facility] was planned however his screening COVID returned positive thus he was not accepted and requires 10 days of quarantine prior to acceptance back.     Discharge to group home with hospice on Monday    Interval Problem Update  Patient  "was seen and examined at bedside.  I have personally reviewed and interpreted vitals, labs, and imaging.    8/26.  Afebrile.  Episode of hypotension overnight has improved.  On room air.  Patient is very lethargic.  States he is feeling \"not going\".  He is unable to elucidate further and mostly mumbles incoherently.  He does ask for water.    I have discussed this patient's plan of care and discharge plan at IDT rounds today with Case Management, Nursing, Nursing leadership, and other members of the IDT team.    Consultants/Specialty  None    Code Status  DNAR/DNI    Disposition  Patient is medically cleared for discharge.   Anticipate discharge to to hospice.  I have placed the appropriate orders for post-discharge needs.    Review of Systems  Review of Systems   Unable to perform ROS: Dementia      Physical Exam  Temp:  [36.3 °C (97.4 °F)-36.8 °C (98.3 °F)] 36.4 °C (97.5 °F)  Pulse:  [61-75] 61  Resp:  [16-18] 18  BP: ()/(59-67) 122/64  SpO2:  [92 %-98 %] 92 %    Physical Exam  Vitals and nursing note reviewed.   Constitutional:       Appearance: Normal appearance. He is ill-appearing.   HENT:      Head: Normocephalic and atraumatic.      Nose: Nose normal.      Mouth/Throat:      Mouth: Mucous membranes are moist.      Pharynx: Oropharynx is clear.   Eyes:      Extraocular Movements: Extraocular movements intact.      Conjunctiva/sclera: Conjunctivae normal.   Cardiovascular:      Rate and Rhythm: Normal rate and regular rhythm.      Pulses: Normal pulses.      Heart sounds: Normal heart sounds. No murmur heard.    No friction rub. No gallop.   Pulmonary:      Effort: Pulmonary effort is normal. No respiratory distress.      Breath sounds: Normal breath sounds. No wheezing or rales.   Chest:      Chest wall: No tenderness.   Abdominal:      General: Abdomen is flat. Bowel sounds are normal. There is no distension.      Palpations: Abdomen is soft. There is no mass.      Tenderness: There is no abdominal " tenderness. There is no guarding.   Musculoskeletal:         General: Normal range of motion.      Cervical back: Normal range of motion and neck supple.      Right lower leg: Edema present.      Left lower leg: Edema present.   Skin:     General: Skin is warm.      Capillary Refill: Capillary refill takes less than 2 seconds.   Neurological:      General: No focal deficit present.      Mental Status: Mental status is at baseline. He is lethargic and disoriented.      Cranial Nerves: No cranial nerve deficit.      Motor: No weakness.   Psychiatric:      Comments: Minimally verbal.       Fluids    Intake/Output Summary (Last 24 hours) at 8/26/2022 1028  Last data filed at 8/26/2022 0354  Gross per 24 hour   Intake 150 ml   Output 650 ml   Net -500 ml       Laboratory                        Imaging  No orders to display        Assessment/Plan  * UTI (urinary tract infection)- (present on admission)  Assessment & Plan  -Recent urine culture positive for Pseudomonas, Enterococcus  -Given a dose of cefepime in the ER,changed to IV Zosyn; switched to Levofloxacin completed 8/21  -catheter was replaced in ER  - failed voiding trial, krueger order replaced     COVID-19  Assessment & Plan  - Noted positive as of 8/17/2022  - no respiratory symptoms   -We will need to stay 10 days until COVID test is negative (8/27) prior to transfer back to [facility]   - supportive care   - isolation precautions     GERD (gastroesophageal reflux disease)  Assessment & Plan  Continue omeprazole    Multiple excoriations- (present on admission)  Assessment & Plan  No obvious infection   Monitor   Keep skin hydrated   Q2H turns     Urinary retention- (present on admission)  Assessment & Plan  -Developed urinary retention at last hospitalization last month, started flomax, has urology referral (appt 8/17/22)  -voiding trial failed depsite multiple day attempt with additional of medication   - krueger replacement ordered   - can discuss suprapubic given pt  discomfort however with hospice elected unsure if suprapubic is necessary   - avoid anticholinergics     Dementia (HCC)- (present on admission)  Assessment & Plan  -History of Lewy body dementia per medical records.  -Resume Seroquel, donepezil  -Fall, aspiration precautions  - at baseline, A&O x1  - referral to hospice         VTE prophylaxis: enoxaparin ppx    I have performed a physical exam and reviewed and updated ROS and Plan today (8/26/2022). In review of yesterday's note (8/25/2022), there are no changes except as documented above.

## 2022-08-26 NOTE — CARE PLAN
The patient is Stable - Low risk of patient condition declining or worsening    Shift Goals  Clinical Goals: Maintain skin integrity  Patient Goals: Rest, comfort  Family Goals: MAMI    Progress made toward(s) clinical / shift goals: No c/o or signs of pain at this time. Pt remains free from falls with bed alarm on and safety precautions in place.       Problem: Skin Integrity  Goal: Skin integrity is maintained or improved  Outcome: Progressing     Problem: Fall Risk  Goal: Patient will remain free from falls  Outcome: Progressing     Problem: Pain - Standard  Goal: Alleviation of pain or a reduction in pain to the patient’s comfort goal  Outcome: Progressing

## 2022-08-26 NOTE — PROGRESS NOTES
Pt is A&O x1 at baseline. Q2hr turns, urinary catheter draining clear yellow urine. No signs or c/o of pain at this time. Pt resting in bed with call light in reach, bed alarm on and safety precautions in place.

## 2022-08-27 NOTE — CARE PLAN
The patient is Stable - Low risk of patient condition declining or worsening    Shift Goals  Clinical Goals: maintain skin integrity  Patient Goals: rest/comfort  Family Goals: get back to baseline    Progress made toward(s) clinical / shift goals:    Problem: Knowledge Deficit - Standard  Goal: Patient and family/care givers will demonstrate understanding of plan of care, disease process/condition, diagnostic tests and medications  Outcome: Progressing     Problem: Skin Integrity  Goal: Skin integrity is maintained or improved  Outcome: Progressing     Problem: Fall Risk  Goal: Patient will remain free from falls  Outcome: Progressing     Patient is not progressing towards the following goals:

## 2022-08-27 NOTE — PROGRESS NOTES
Hospital Medicine Daily Progress Note    Date of Service  8/27/2022    Chief Complaint  Johann Davidson is a 88 y.o. male admitted 8/14/2022 with urinary tract infection    Hospital Course  Mr. Johann Davidson is an 88-year-old male with a PMHx of Lewy body dementia, chronic urinary retention with indwelling Redd catheter, DMT2, GERD, HTN, who presented on 8/14/2022 from [facility] due to concerns of purulent discharge around chronic Redd catheter.    Patient is only oriented to self.  Patient has been residing at [facility] due to significant history of dementia.  Patient was brought to the ER due to concerns of purulent discharge around Redd catheter.  Redd catheter was removed at [facility].  Admitting provider was unable to obtain history as patient was altered and was incoherent.  Cognitive baseline on admission was unknown.  Family was not at bedside.  Also noted that patient was in the ER on 8/1 after a ground-level fall and a head injury.  Other recent hospitalization indicated on 7/24-31/2022, patient was treated for UTI with urine culture positive for Pseudomonas and Enterococcus and treated with Zosyn.  Patient also noted to have a history of allergy to ciprofloxacin, but this was verified by a family member over the phone that he is NOT allergic to ciprofloxacin.    In ER, patient's Redd catheter was replaced.  Also noted a small ulceration without surrounding erythema on the foreskin was noted.  Wound team was consulted.  Urine analysis came back positive for infection.  Patient was given cefepime in ER and switched to Zosyn thereafter. Cultures growing pseudomonas sensitive to levofloxacin thus he was switched to oral regiment. Discharge back to [facility] was planned however his screening COVID returned positive thus he was not accepted and requires 10 days of quarantine prior to acceptance back.     Discharge to group home with hospice on Monday    Interval Problem Update  Patient was  "seen and examined at bedside.  I have personally reviewed and interpreted vitals, labs, and imaging.    8/26.  Afebrile.  Episode of hypotension overnight has improved.  On room air.  Patient is very lethargic.  States he is feeling \"not good\".  He is unable to elucidate further and mostly mumbles incoherently.  He does ask for water.  8/27.  Afebrile.  Stable vitals.  On room air.  Patient very lethargic.  Reports pain around his abdomen but then states he does not have pain.      I have discussed this patient's plan of care and discharge plan at IDT rounds today with Case Management, Nursing, Nursing leadership, and other members of the IDT team.    Consultants/Specialty  None    Code Status  DNAR/DNI    Disposition  Patient is medically cleared for discharge.   Anticipate discharge to to hospice.  I have placed the appropriate orders for post-discharge needs.    Review of Systems  Review of Systems   Unable to perform ROS: Dementia      Physical Exam  Temp:  [36.1 °C (97 °F)-36.7 °C (98 °F)] 36.1 °C (97 °F)  Pulse:  [61-94] 64  Resp:  [18-19] 18  BP: (108-130)/(55-75) 130/55  SpO2:  [92 %-96 %] 93 %    Physical Exam  Vitals and nursing note reviewed.   Constitutional:       Appearance: Normal appearance. He is ill-appearing.   HENT:      Head: Normocephalic and atraumatic.      Nose: Nose normal.      Mouth/Throat:      Mouth: Mucous membranes are moist.      Pharynx: Oropharynx is clear.   Eyes:      Extraocular Movements: Extraocular movements intact.      Conjunctiva/sclera: Conjunctivae normal.   Cardiovascular:      Rate and Rhythm: Normal rate and regular rhythm.      Pulses: Normal pulses.      Heart sounds: Normal heart sounds. No murmur heard.    No friction rub. No gallop.   Pulmonary:      Effort: Pulmonary effort is normal. No respiratory distress.      Breath sounds: Normal breath sounds. No wheezing or rales.   Chest:      Chest wall: No tenderness.   Abdominal:      General: Abdomen is flat. Bowel " sounds are normal. There is no distension.      Palpations: Abdomen is soft. There is no mass.      Tenderness: There is no abdominal tenderness. There is no guarding.   Musculoskeletal:         General: Normal range of motion.      Cervical back: Normal range of motion and neck supple.      Right lower leg: Edema present.      Left lower leg: Edema present.   Skin:     General: Skin is warm.      Capillary Refill: Capillary refill takes less than 2 seconds.   Neurological:      General: No focal deficit present.      Mental Status: Mental status is at baseline. He is lethargic and disoriented.      Cranial Nerves: No cranial nerve deficit.      Motor: No weakness.   Psychiatric:      Comments: Minimally verbal.       Fluids    Intake/Output Summary (Last 24 hours) at 8/27/2022 0655  Last data filed at 8/27/2022 0500  Gross per 24 hour   Intake 118 ml   Output 800 ml   Net -682 ml         Laboratory                        Imaging  No orders to display        Assessment/Plan  * UTI (urinary tract infection)- (present on admission)  Assessment & Plan  -Recent urine culture positive for Pseudomonas, Enterococcus  -Given a dose of cefepime in the ER,changed to IV Zosyn; switched to Levofloxacin completed 8/21  -catheter was replaced in ER  - failed voiding trial, krueger order replaced     COVID-19  Assessment & Plan  - Noted positive as of 8/17/2022  - no respiratory symptoms   -We will need to stay 10 days until COVID test is negative (8/27) prior to transfer back to [facility]   - supportive care   - isolation precautions     GERD (gastroesophageal reflux disease)  Assessment & Plan  Continue omeprazole    Multiple excoriations- (present on admission)  Assessment & Plan  No obvious infection   Monitor   Keep skin hydrated   Q2H turns     Urinary retention- (present on admission)  Assessment & Plan  -Developed urinary retention at last hospitalization last month, started flomax, has urology referral (appt 8/17/22)  -voiding  trial failed depsite multiple day attempt with additional of medication   - krueger replacement ordered   - can discuss suprapubic given pt discomfort however with hospice elected unsure if suprapubic is necessary   - avoid anticholinergics     Dementia (HCC)- (present on admission)  Assessment & Plan  -History of Lewy body dementia per medical records.  -Resume Seroquel, donepezil  -Fall, aspiration precautions  - at baseline, A&O x1  - referral to hospice       VTE prophylaxis: enoxaparin ppx    I have performed a physical exam and reviewed and updated ROS and Plan today (8/27/2022). In review of yesterday's note (8/26/2022), there are no changes except as documented above.

## 2022-08-28 NOTE — PROGRESS NOTES
Alert and oriented x1, only to self. Safety precautions in placed. Bed in lowest position. Upper side rails up. Treaded socks on. Reinforced the use of call light when needing assistance. Bed alarm is on.

## 2022-08-28 NOTE — CARE PLAN
The patient is Watcher - Medium risk of patient condition declining or worsening    Shift Goals  Clinical Goals: maintain skin integrity  Patient Goals: comfort  Family Goals: get back to baseline    Progress made toward(s) clinical / shift goals:  yes    Patient is not progressing towards the following goals:      Problem: Knowledge Deficit - Standard  Goal: Patient and family/care givers will demonstrate understanding of plan of care, disease process/condition, diagnostic tests and medications  Outcome: Progressing     Problem: Skin Integrity  Goal: Skin integrity is maintained or improved  Outcome: Progressing     Problem: Fall Risk  Goal: Patient will remain free from falls  Outcome: Progressing     Problem: Pain - Standard  Goal: Alleviation of pain or a reduction in pain to the patient’s comfort goal  Outcome: Progressing

## 2022-08-28 NOTE — PROGRESS NOTES
Hospital Medicine Daily Progress Note    Date of Service  8/28/2022    Chief Complaint  Johann Davidson is a 88 y.o. male admitted 8/14/2022 with urinary tract infection    Hospital Course  Mr. 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    Patient is only oriented to self.  Patient has been residing at [facility] due to significant history of dementia.  Patient was brought to the ER due to concerns of purulent discharge around Redd catheter.  Redd catheter was removed at [facility].  Admitting provider was unable to obtain history as patient was altered and was incoherent.  Cognitive baseline on admission was unknown.  Family was not at bedside.  Also noted that patient was in the ER on 8/1 after a ground-level fall and a head injury.  Other recent hospitalization indicated on 7/24-31/2022, patient was treated for UTI with urine culture positive for Pseudomonas and Enterococcus and treated with Zosyn.  Patient also noted to have a history of allergy to ciprofloxacin, but this was verified by a family member over the phone that he is NOT allergic to ciprofloxacin.    In ER, patient's Redd catheter was replaced.  Also noted a small ulceration without surrounding erythema on the foreskin was noted.  Wound team was consulted.  Urine analysis came back positive for infection.  Patient was given cefepime in ER and switched to Zosyn thereafter. Cultures growing pseudomonas sensitive to levofloxacin thus he was switched to oral regiment. Discharge back to [facility] was planned however his screening COVID returned positive thus he was not accepted and requires 10 days of quarantine prior to acceptance back.     Discharge to group home with hospice on Monday    Interval Problem Update  Patient was  "seen and examined at bedside.  I have personally reviewed and interpreted vitals, labs, and imaging.    8/26.  Afebrile.  Episode of hypotension overnight has improved.  On room air.  Patient is very lethargic.  States he is feeling \"not good\".  He is unable to elucidate further and mostly mumbles incoherently.  He does ask for water.  8/27.  Afebrile.  Stable vitals.  On room air.  Patient very lethargic.  Reports pain around his abdomen but then states he does not have pain.    8/28.  Afebrile.  Stable vitals.  On room air.  Ordered COVID.  Patient more awake and interactive this morning.  Still very confused.  With most of review of systems he states \"it is going away\".  When asked what is he states I do not know.    I have discussed this patient's plan of care and discharge plan at IDT rounds today with Case Management, Nursing, Nursing leadership, and other members of the IDT team.    Consultants/Specialty  None    Code Status  DNAR/DNI    Disposition  Patient is medically cleared for discharge.   Anticipate discharge to to hospice.  I have placed the appropriate orders for post-discharge needs.    Review of Systems  Review of Systems   Unable to perform ROS: Dementia      Physical Exam  Temp:  [36.1 °C (96.9 °F)-36.6 °C (97.8 °F)] 36.2 °C (97.2 °F)  Pulse:  [66-74] 70  Resp:  [16-19] 18  BP: (107-132)/(54-76) 112/54  SpO2:  [92 %-95 %] 92 %    Physical Exam  Vitals and nursing note reviewed.   Constitutional:       Appearance: Normal appearance. He is ill-appearing.   HENT:      Head: Normocephalic and atraumatic.      Nose: Nose normal.      Mouth/Throat:      Mouth: Mucous membranes are moist.      Pharynx: Oropharynx is clear.   Eyes:      Extraocular Movements: Extraocular movements intact.      Conjunctiva/sclera: Conjunctivae normal.   Cardiovascular:      Rate and Rhythm: Normal rate and regular rhythm.      Pulses: Normal pulses.      Heart sounds: Normal heart sounds. No murmur heard.    No friction " rub. No gallop.   Pulmonary:      Effort: Pulmonary effort is normal. No respiratory distress.      Breath sounds: Normal breath sounds. No wheezing or rales.   Chest:      Chest wall: No tenderness.   Abdominal:      General: Abdomen is flat. Bowel sounds are normal. There is no distension.      Palpations: Abdomen is soft. There is no mass.      Tenderness: There is no abdominal tenderness. There is no guarding.   Musculoskeletal:         General: Normal range of motion.      Cervical back: Normal range of motion and neck supple.      Right lower leg: Edema present.      Left lower leg: Edema present.   Skin:     General: Skin is warm.      Capillary Refill: Capillary refill takes less than 2 seconds.   Neurological:      General: No focal deficit present.      Mental Status: Mental status is at baseline. He is lethargic and disoriented.      Cranial Nerves: No cranial nerve deficit.      Motor: No weakness.   Psychiatric:      Comments: Minimally verbal.       Fluids    Intake/Output Summary (Last 24 hours) at 8/28/2022 0723  Last data filed at 8/28/2022 0513  Gross per 24 hour   Intake 236 ml   Output 775 ml   Net -539 ml         Laboratory                        Imaging  No orders to display        Assessment/Plan  * UTI (urinary tract infection)- (present on admission)  Assessment & Plan  -Recent urine culture positive for Pseudomonas, Enterococcus  -Given a dose of cefepime in the ER,changed to IV Zosyn; switched to Levofloxacin completed 8/21  -catheter was replaced in ER  - failed voiding trial, krueger order replaced     COVID-19  Assessment & Plan  - Noted positive as of 8/17/2022  - no respiratory symptoms   -We will need to stay 10 days until COVID test is negative (8/27) prior to transfer back to [facility]   - supportive care   - isolation precautions     GERD (gastroesophageal reflux disease)  Assessment & Plan  Continue omeprazole    Multiple excoriations- (present on admission)  Assessment & Plan  No  obvious infection   Monitor   Keep skin hydrated   Q2H turns     Urinary retention- (present on admission)  Assessment & Plan  -Developed urinary retention at last hospitalization last month, started flomax, has urology referral (appt 8/17/22)  -voiding trial failed depsite multiple day attempt with additional of medication   - krueger replacement ordered   - can discuss suprapubic given pt discomfort however with hospice elected unsure if suprapubic is necessary   - avoid anticholinergics     Dementia (HCC)- (present on admission)  Assessment & Plan  -History of Lewy body dementia per medical records.  -Resume Seroquel, donepezil  -Fall, aspiration precautions  - at baseline, A&O x1  - referral to hospice         VTE prophylaxis: enoxaparin ppx    I have performed a physical exam and reviewed and updated ROS and Plan today (8/28/2022). In review of yesterday's note (8/27/2022), there are no changes except as documented above.         EXAGGERATED

## 2022-08-29 PROBLEM — U07.1 COVID-19: Status: RESOLVED | Noted: 2022-01-01 | Resolved: 2022-01-01

## 2022-08-29 NOTE — DISCHARGE SUMMARY
Discharge Summary    CHIEF COMPLAINT ON ADMISSION  Chief Complaint   Patient presents with    Urinary Catheter Problem     Chronic krueger. Removed at nursing home. Discharge around penis.        Reason for Admission  catheter problem     Admission Date  8/14/2022    CODE STATUS  DNAR/DNI    HPI & HOSPITAL COURSE  Mr. Johann Davidson is an 88-year-old male with a PMHx of Lewy body dementia, chronic urinary retention with indwelling Krueger catheter, DMT2, GERD, HTN, who presented on 8/14/2022 from [facility] due to concerns of purulent discharge around chronic Krueger catheter.     Patient is only oriented to self.  Patient has been residing at [facility] due to significant history of dementia.  Patient was brought to the ER due to concerns of purulent discharge around Krueger catheter.  Krueger catheter was removed at [facility].  Admitting provider was unable to obtain history as patient was altered and was incoherent.  Cognitive baseline on admission was unknown.  Family was not at bedside.  Also noted that patient was in the ER on 8/1 after a ground-level fall and a head injury.  Other recent hospitalization indicated on 7/24-31/2022, patient was treated for UTI with urine culture positive for Pseudomonas and Enterococcus and treated with Zosyn.  Patient also noted to have a history of allergy to ciprofloxacin, but this was verified by a family member over the phone that he is NOT allergic to ciprofloxacin.     In ER, patient's Krueger catheter was replaced.  Also noted a small ulceration without surrounding erythema on the foreskin was noted.  Wound team was consulted.  Urine analysis came back positive for infection.  Patient was given cefepime in ER and switched to Zosyn thereafter. Cultures growing pseudomonas sensitive to levofloxacin thus he was switched to oral regiment. Discharge back to [facility] was planned however his screening COVID returned positive 8/17 thus he was not accepted and requires 10  days of quarantine prior to acceptance back.  Per CDC guidelines patient has completed quarantine and is cleared.     Medically stable to discharge to group home with [facility].      Therefore, he is discharged in fair and stable condition to hospice.    The patient met 2-midnight criteria for an inpatient stay at the time of discharge.    Discharge Date  8/29/2022    FOLLOW UP ITEMS POST DISCHARGE  None    DISCHARGE DIAGNOSES  Principal Problem:    UTI (urinary tract infection) POA: Yes  Active Problems:    Dementia (HCC) POA: Yes    Urinary retention POA: Yes    Multiple excoriations POA: Yes    GERD (gastroesophageal reflux disease) POA: Unknown  Resolved Problems:    Hypokalemia POA: Unknown    COVID-19 POA: Yes      FOLLOW UP  [facility]  [address] [place] [address]  [phone]        Lisa Saeed P.A.-C.  [address] [facility] [id number] [phone]    Schedule an appointment as soon as possible for a visit in 1 week(s)      Melanie Crawley P.A.-C.  [address] [id number] [phone]    Schedule an appointment as soon as possible for a visit in 1 week(s)        MEDICATIONS ON DISCHARGE     Medication List        START taking these medications        Instructions   finasteride 5 MG Tabs  Start taking on: August 30, 2022  Commonly known as: PROSCAR   Take 1 Tablet by mouth every day.  Dose: 5 mg            CHANGE how you take these medications        Instructions   tamsulosin 0.4 MG capsule  What changed: how much to take  Commonly known as: FLOMAX   Take 2 Capsules by mouth 1/2 hour after breakfast.  Dose: 0.8 mg            CONTINUE taking these medications        Instructions   acetaminophen 325 MG Tabs  Commonly known as: Tylenol   Take 650 mg by mouth every four hours as needed for Mild Pain.  Dose: 650 mg     artificial tears Oint ophth ointment  Commonly known as: EYE LUBRICANT   Apply 1 Application to both eyes every evening.  Dose: 1  Application     aspirin 81 MG Chew chewable tablet  Commonly known as: ASA   Chew 81 mg every day. Take one tablet by mouth once a day  Dose: 81 mg     atorvastatin 10 MG Tabs  Commonly known as: LIPITOR   Take 10 mg by mouth every day. Take 1 tablet by mouth once a day  Dose: 10 mg     bacitracin 500 UNIT/GM Oint   Apply 1 Each topically 2 times a day.  Dose: 1 Each     docusate sodium 100 MG Caps  Commonly known as: COLACE   Take 100 mg by mouth 2 times a day.  Dose: 100 mg     donepezil 5 MG Tabs  Commonly known as: ARICEPT   Take 5 mg by mouth every day. Take one tablet once a day  Dose: 5 mg     hydrocortisone 0.5 % Crea   Apply 1 Application topically 2 times a day.  Dose: 1 Application     omeprazole 20 MG delayed-release capsule  Commonly known as: PRILOSEC   Take 20 mg by mouth every day. Take one capsule by mouth daily  Dose: 20 mg     polyethylene glycol/lytes 17 g Pack  Commonly known as: MIRALAX   Take 17 g by mouth every day.  Dose: 17 g     QUEtiapine 25 MG Tabs  Commonly known as: Seroquel   Take 25 mg by mouth at bedtime as needed. Take one tablet nightly as needed  Dose: 25 mg     senna-docusate 8.6-50 MG Tabs  Commonly known as: PERICOLACE or SENOKOT S   Take 1 Tablet by mouth 1 time a day as needed (Take one tablet as needed once a day for constipation). Take one tablet as needed once a day for constipation  Indications: Constipation  Dose: 1 Tablet     Vitamin A & D Oint   Apply 1 Application topically every day.  Dose: 1 Application     vitamin B-12 CR 1000 MCG Tbcr tablet  Commonly known as: CYANOCOBALAMIN   Take 1,000 mcg by mouth every day. Take one tablet by mouth daily  Dose: 1,000 mcg              Allergies  Allergies   Allergen Reactions    Lidocaine      Per MAR from [place]     Tetanus-Diphth-Acell Pertussis        DIET  Orders Placed This Encounter   Procedures    Diet Order Diet: Level 5 - Minced and Moist; Liquid level: Level 2 - Mildly Thick     Standing Status:   Standing      Number of Occurrences:   1     Order Specific Question:   Diet:     Answer:   Level 5 - Minced and Moist [24]     Order Specific Question:   Liquid level     Answer:   Level 2 - Mildly Thick       ACTIVITY  As tolerated.  Weight bearing as tolerated    CONSULTATIONS  None    PROCEDURES  None    LABORATORY  Lab Results   Component Value Date    SODIUM 138 08/22/2022    POTASSIUM 3.7 08/22/2022    CHLORIDE 106 08/22/2022    CO2 22 08/22/2022    GLUCOSE 91 08/22/2022    BUN 12 08/22/2022    CREATININE 0.86 08/22/2022        Lab Results   Component Value Date    WBC 6.7 08/20/2022    HEMOGLOBIN 11.9 (L) 08/20/2022    HEMATOCRIT 36.0 (L) 08/20/2022    PLATELETCT 331 08/20/2022        I discussed medications and side effects with the patient.  I discussed prognosis and importance of medical compliance with the patient.  I counseled the patient about diet, exercise, weight loss, smoking cessation, and life style modifications.  All questions and concerns have been addressed.  Total time of the discharge process was 37 minutes.

## 2022-08-29 NOTE — DISCHARGE PLANNING
Case Management Discharge Planning    Admission Date: 8/14/2022  GMLOS: 4.7  ALOS: 14    6-Clicks ADL Score: 13  6-Clicks Mobility Score: 10  PT and/or OT Eval ordered: Yes  Post-acute Referrals Ordered: Yes  Post-acute Choice Obtained: Yes  Has referral(s) been sent to post-acute provider:  Yes      Anticipated Discharge Dispo: Discharge Disposition: D/T to hospice home (50)  Discharge Contact Phone Number: [phone]    DME Needed: Yes    DME Ordered: Yes    Action(s) Taken: DC Assessment Complete (See below)    Escalations Completed: None    Medically Clear: Yes    Next Steps: Patient discussed in IDT rounds, is medically cleared for DC to [facility] with hospice. [phone].  has all needed equipment and has [facility]. [facility] transport requested for 1300 today. MD and nurse made aware by voalte.    Barriers to Discharge: None    Is the patient up for discharge tomorrow: No, DC today.

## 2022-08-29 NOTE — PROGRESS NOTES
Charge rn victor manuel said to not put patient on isolation for covid due to already being covid positive on 8/17

## 2022-08-29 NOTE — HOSPICE
HOA left message for Diane, group home owner for [facility] at [phone] DME still needs to be set up. Have not been able to get a hold of Diane.

## 2022-08-29 NOTE — CARE PLAN
The patient is Stable - Low risk of patient condition declining or worsening    Shift Goals  Clinical Goals: comfort  Patient Goals: pt will be able to rest and sleep comfortably  Family Goals: get back to baseline    Progress made toward(s) clinical / shift goals:     Patient is not progressing towards the following goals:

## 2022-08-29 NOTE — DISCHARGE PLANNING
HOA left message for Diane, group home owner at [phone] to ascertain what time today  is able to accept patient. Requested call back to [phone].

## 2022-08-29 NOTE — DISCHARGE PLANNING
DC Transport Scheduled    Received request at: 1201    Transport Company Scheduled:  [employer]  Spoke with Che at [facility] to schedule transport.      Scheduled Date: 08/29/2022  Scheduled Time: 1415    Destination: [address]     Notified care team of scheduled transport via Voalte.     If there are any changes needed to the DC transportation scheduled, please contact [facility] at ext. [phone] between the hours of 6291-6005 Mon-Fri. If outside those hours, contact the ED Case Manager at ext. [phone].

## 2022-08-29 NOTE — PROGRESS NOTES
Alert and oriented x1, only to self. Offered fluids and snacks. Safety precautions in placed. Bed in lowest position. Upper side rails up. Treaded socks on. Reinforced the use of call light when needing assistance.  Bed alarm is on.

## 2023-07-03 NOTE — PROGRESS NOTES
Palliative Care   This APRN received email from patient's son, Tad, asking for update on discharge.  I Voalte'd Dr. Lori Shook, who affirmed she would call son with update on patient condition, POC, DC plan.  I also emailed son back and provided him with nurses station phone number for inquiries.    Ekta Davis, SID, APRN, [facility]  Palliative Care Nurse Practitioner  [phone]       Patient wants an appointment earlier than [phone] appointment and is insisting on speaking with nurse.  Phone: [phone]